# Patient Record
Sex: FEMALE | Race: WHITE | NOT HISPANIC OR LATINO | Employment: OTHER | URBAN - METROPOLITAN AREA
[De-identification: names, ages, dates, MRNs, and addresses within clinical notes are randomized per-mention and may not be internally consistent; named-entity substitution may affect disease eponyms.]

---

## 2024-06-28 ENCOUNTER — HOSPITAL ENCOUNTER (INPATIENT)
Facility: HOSPITAL | Age: 84
LOS: 4 days | Discharge: HOME | End: 2024-07-03
Attending: EMERGENCY MEDICINE | Admitting: STUDENT IN AN ORGANIZED HEALTH CARE EDUCATION/TRAINING PROGRAM
Payer: MEDICARE

## 2024-06-28 ENCOUNTER — APPOINTMENT (OUTPATIENT)
Dept: MRI IMAGING | Facility: HOSPITAL | Age: 84
End: 2024-06-28
Payer: MEDICARE

## 2024-06-28 DIAGNOSIS — S22.080A COMPRESSION FRACTURE OF T11 VERTEBRA, INITIAL ENCOUNTER: ICD-10-CM

## 2024-06-28 DIAGNOSIS — M54.9 SEVERE BACK PAIN: ICD-10-CM

## 2024-06-28 DIAGNOSIS — Z02.89 REFERRED BY HEALTH CARE PROFESSIONAL: ICD-10-CM

## 2024-06-28 DIAGNOSIS — S22.070A COMPRESSION FRACTURE OF T9 VERTEBRA, INITIAL ENCOUNTER: Primary | ICD-10-CM

## 2024-06-28 DIAGNOSIS — S22.009A CLOSED FRACTURE OF MULTIPLE THORACIC VERTEBRAE, INITIAL ENCOUNTER: ICD-10-CM

## 2024-06-28 DIAGNOSIS — M80.08XA OSTEOPOROSIS WITH PATHOLOGICAL FRACTURE OF THORACIC VERTEBRA: ICD-10-CM

## 2024-06-28 DIAGNOSIS — R52 INTRACTABLE PAIN: ICD-10-CM

## 2024-06-28 PROBLEM — I10 HTN (HYPERTENSION): Status: ACTIVE | Noted: 2024-06-28

## 2024-06-28 LAB
ALBUMIN SERPL-MCNC: 3.6 G/DL (ref 3.5–5.2)
ALBUMIN/GLOB SERPL: 1.1 G/DL
ALP SERPL-CCNC: 114 U/L (ref 39–117)
ALT SERPL W P-5'-P-CCNC: 12 U/L (ref 1–33)
ANION GAP SERPL CALCULATED.3IONS-SCNC: 13 MMOL/L (ref 5–15)
AST SERPL-CCNC: 18 U/L (ref 1–32)
BASOPHILS # BLD AUTO: 0.08 10*3/MM3 (ref 0–0.2)
BASOPHILS NFR BLD AUTO: 0.7 % (ref 0–1.5)
BILIRUB SERPL-MCNC: 0.4 MG/DL (ref 0–1.2)
BUN SERPL-MCNC: 14 MG/DL (ref 8–23)
BUN/CREAT SERPL: 11.9 (ref 7–25)
CALCIUM SPEC-SCNC: 8.8 MG/DL (ref 8.6–10.5)
CHLORIDE SERPL-SCNC: 103 MMOL/L (ref 98–107)
CO2 SERPL-SCNC: 25 MMOL/L (ref 22–29)
CREAT SERPL-MCNC: 1.18 MG/DL (ref 0.57–1)
DEPRECATED RDW RBC AUTO: 47.6 FL (ref 37–54)
EGFRCR SERPLBLD CKD-EPI 2021: 45.1 ML/MIN/1.73
EOSINOPHIL # BLD AUTO: 0.31 10*3/MM3 (ref 0–0.4)
EOSINOPHIL NFR BLD AUTO: 2.8 % (ref 0.3–6.2)
ERYTHROCYTE [DISTWIDTH] IN BLOOD BY AUTOMATED COUNT: 13.5 % (ref 12.3–15.4)
GLOBULIN UR ELPH-MCNC: 3.3 GM/DL
GLUCOSE SERPL-MCNC: 97 MG/DL (ref 65–99)
HCT VFR BLD AUTO: 38.9 % (ref 34–46.6)
HGB BLD-MCNC: 12.2 G/DL (ref 12–15.9)
HOLD SPECIMEN: NORMAL
IMM GRANULOCYTES # BLD AUTO: 0.02 10*3/MM3 (ref 0–0.05)
IMM GRANULOCYTES NFR BLD AUTO: 0.2 % (ref 0–0.5)
LYMPHOCYTES # BLD AUTO: 2.63 10*3/MM3 (ref 0.7–3.1)
LYMPHOCYTES NFR BLD AUTO: 23.4 % (ref 19.6–45.3)
MCH RBC QN AUTO: 29.8 PG (ref 26.6–33)
MCHC RBC AUTO-ENTMCNC: 31.4 G/DL (ref 31.5–35.7)
MCV RBC AUTO: 94.9 FL (ref 79–97)
MONOCYTES # BLD AUTO: 0.68 10*3/MM3 (ref 0.1–0.9)
MONOCYTES NFR BLD AUTO: 6 % (ref 5–12)
NEUTROPHILS NFR BLD AUTO: 66.9 % (ref 42.7–76)
NEUTROPHILS NFR BLD AUTO: 7.52 10*3/MM3 (ref 1.7–7)
NRBC BLD AUTO-RTO: 0 /100 WBC (ref 0–0.2)
PLATELET # BLD AUTO: 316 10*3/MM3 (ref 140–450)
PMV BLD AUTO: 10.7 FL (ref 6–12)
POTASSIUM SERPL-SCNC: 3.7 MMOL/L (ref 3.5–5.2)
PROT SERPL-MCNC: 6.9 G/DL (ref 6–8.5)
RBC # BLD AUTO: 4.1 10*6/MM3 (ref 3.77–5.28)
SODIUM SERPL-SCNC: 141 MMOL/L (ref 136–145)
WBC NRBC COR # BLD AUTO: 11.24 10*3/MM3 (ref 3.4–10.8)
WHOLE BLOOD HOLD COAG: NORMAL
WHOLE BLOOD HOLD SPECIMEN: NORMAL

## 2024-06-28 PROCEDURE — G0378 HOSPITAL OBSERVATION PER HR: HCPCS

## 2024-06-28 PROCEDURE — 72146 MRI CHEST SPINE W/O DYE: CPT

## 2024-06-28 PROCEDURE — 99222 1ST HOSP IP/OBS MODERATE 55: CPT | Performed by: NURSE PRACTITIONER

## 2024-06-28 PROCEDURE — 36415 COLL VENOUS BLD VENIPUNCTURE: CPT

## 2024-06-28 PROCEDURE — 80053 COMPREHEN METABOLIC PANEL: CPT | Performed by: NURSE PRACTITIONER

## 2024-06-28 PROCEDURE — 25010000002 KETOROLAC TROMETHAMINE PER 15 MG: Performed by: EMERGENCY MEDICINE

## 2024-06-28 PROCEDURE — 25810000003 SODIUM CHLORIDE 0.9 % SOLUTION: Performed by: EMERGENCY MEDICINE

## 2024-06-28 PROCEDURE — 85025 COMPLETE CBC W/AUTO DIFF WBC: CPT | Performed by: NURSE PRACTITIONER

## 2024-06-28 PROCEDURE — 99285 EMERGENCY DEPT VISIT HI MDM: CPT

## 2024-06-28 PROCEDURE — 25010000002 LORAZEPAM PER 2 MG: Performed by: EMERGENCY MEDICINE

## 2024-06-28 RX ORDER — PANTOPRAZOLE SODIUM 40 MG/1
40 TABLET, DELAYED RELEASE ORAL DAILY
COMMUNITY

## 2024-06-28 RX ORDER — LATANOPROST 50 UG/ML
1 SOLUTION/ DROPS OPHTHALMIC NIGHTLY
COMMUNITY

## 2024-06-28 RX ORDER — BISACODYL 5 MG/1
5 TABLET, DELAYED RELEASE ORAL DAILY PRN
Status: DISCONTINUED | OUTPATIENT
Start: 2024-06-28 | End: 2024-07-03 | Stop reason: HOSPADM

## 2024-06-28 RX ORDER — SODIUM CHLORIDE 0.9 % (FLUSH) 0.9 %
10 SYRINGE (ML) INJECTION EVERY 12 HOURS SCHEDULED
Status: DISCONTINUED | OUTPATIENT
Start: 2024-06-28 | End: 2024-07-03 | Stop reason: HOSPADM

## 2024-06-28 RX ORDER — TRAZODONE HYDROCHLORIDE 50 MG/1
50 TABLET ORAL NIGHTLY
COMMUNITY

## 2024-06-28 RX ORDER — KETOROLAC TROMETHAMINE 15 MG/ML
15 INJECTION, SOLUTION INTRAMUSCULAR; INTRAVENOUS EVERY 6 HOURS PRN
Status: DISCONTINUED | OUTPATIENT
Start: 2024-06-28 | End: 2024-06-30

## 2024-06-28 RX ORDER — DORZOLAMIDE HYDROCHLORIDE AND TIMOLOL MALEATE 20; 5 MG/ML; MG/ML
1 SOLUTION/ DROPS OPHTHALMIC 2 TIMES DAILY
Status: DISCONTINUED | OUTPATIENT
Start: 2024-06-28 | End: 2024-07-03 | Stop reason: HOSPADM

## 2024-06-28 RX ORDER — POLYETHYLENE GLYCOL 3350 17 G/17G
17 POWDER, FOR SOLUTION ORAL DAILY PRN
Status: DISCONTINUED | OUTPATIENT
Start: 2024-06-28 | End: 2024-07-03 | Stop reason: HOSPADM

## 2024-06-28 RX ORDER — DORZOLAMIDE HYDROCHLORIDE AND TIMOLOL MALEATE 20; 5 MG/ML; MG/ML
1 SOLUTION/ DROPS OPHTHALMIC 2 TIMES DAILY
COMMUNITY

## 2024-06-28 RX ORDER — PANTOPRAZOLE SODIUM 40 MG/1
40 TABLET, DELAYED RELEASE ORAL DAILY
Status: DISCONTINUED | OUTPATIENT
Start: 2024-06-28 | End: 2024-07-03 | Stop reason: HOSPADM

## 2024-06-28 RX ORDER — MORPHINE SULFATE 2 MG/ML
1 INJECTION, SOLUTION INTRAMUSCULAR; INTRAVENOUS EVERY 4 HOURS PRN
Status: DISPENSED | OUTPATIENT
Start: 2024-06-28 | End: 2024-07-03

## 2024-06-28 RX ORDER — SODIUM CHLORIDE 0.9 % (FLUSH) 0.9 %
10 SYRINGE (ML) INJECTION AS NEEDED
Status: DISCONTINUED | OUTPATIENT
Start: 2024-06-28 | End: 2024-07-03 | Stop reason: HOSPADM

## 2024-06-28 RX ORDER — AMOXICILLIN 250 MG
2 CAPSULE ORAL 2 TIMES DAILY
Status: DISCONTINUED | OUTPATIENT
Start: 2024-06-28 | End: 2024-07-03 | Stop reason: HOSPADM

## 2024-06-28 RX ORDER — OXYCODONE HYDROCHLORIDE 5 MG/1
5 TABLET ORAL EVERY 6 HOURS PRN
Status: DISPENSED | OUTPATIENT
Start: 2024-06-28 | End: 2024-07-03

## 2024-06-28 RX ORDER — KETOROLAC TROMETHAMINE 15 MG/ML
7.5 INJECTION, SOLUTION INTRAMUSCULAR; INTRAVENOUS ONCE
Status: COMPLETED | OUTPATIENT
Start: 2024-06-28 | End: 2024-06-28

## 2024-06-28 RX ORDER — BUSPIRONE HYDROCHLORIDE 10 MG/1
5 TABLET ORAL 2 TIMES DAILY
Status: DISCONTINUED | OUTPATIENT
Start: 2024-06-28 | End: 2024-07-03 | Stop reason: HOSPADM

## 2024-06-28 RX ORDER — FERROUS SULFATE 325(65) MG
325 TABLET ORAL
COMMUNITY

## 2024-06-28 RX ORDER — SODIUM CHLORIDE 9 MG/ML
40 INJECTION, SOLUTION INTRAVENOUS AS NEEDED
Status: DISCONTINUED | OUTPATIENT
Start: 2024-06-28 | End: 2024-07-03 | Stop reason: HOSPADM

## 2024-06-28 RX ORDER — ONDANSETRON 2 MG/ML
4 INJECTION INTRAMUSCULAR; INTRAVENOUS EVERY 6 HOURS PRN
Status: DISCONTINUED | OUTPATIENT
Start: 2024-06-28 | End: 2024-07-03 | Stop reason: HOSPADM

## 2024-06-28 RX ORDER — DORZOLAMIDE HYDROCHLORIDE AND TIMOLOL MALEATE 20; 5 MG/ML; MG/ML
1 SOLUTION/ DROPS OPHTHALMIC 2 TIMES DAILY
COMMUNITY
End: 2024-07-03 | Stop reason: HOSPADM

## 2024-06-28 RX ORDER — DORZOLAMIDE HYDROCHLORIDE AND TIMOLOL MALEATE 20; 5 MG/ML; MG/ML
1 SOLUTION/ DROPS OPHTHALMIC 2 TIMES DAILY
Status: DISCONTINUED | OUTPATIENT
Start: 2024-06-28 | End: 2024-06-28 | Stop reason: CLARIF

## 2024-06-28 RX ORDER — ACETAMINOPHEN 500 MG
1000 TABLET ORAL ONCE
Status: COMPLETED | OUTPATIENT
Start: 2024-06-28 | End: 2024-06-28

## 2024-06-28 RX ORDER — HYDRALAZINE HYDROCHLORIDE 20 MG/ML
10 INJECTION INTRAMUSCULAR; INTRAVENOUS EVERY 6 HOURS PRN
Status: DISCONTINUED | OUTPATIENT
Start: 2024-06-28 | End: 2024-07-03 | Stop reason: HOSPADM

## 2024-06-28 RX ORDER — LOSARTAN POTASSIUM 25 MG/1
25 TABLET ORAL
Status: DISCONTINUED | OUTPATIENT
Start: 2024-06-28 | End: 2024-07-03 | Stop reason: HOSPADM

## 2024-06-28 RX ORDER — POTASSIUM CHLORIDE 750 MG/1
10 CAPSULE, EXTENDED RELEASE ORAL DAILY
COMMUNITY
End: 2024-07-15 | Stop reason: HOSPADM

## 2024-06-28 RX ORDER — BISACODYL 10 MG
10 SUPPOSITORY, RECTAL RECTAL DAILY PRN
Status: DISCONTINUED | OUTPATIENT
Start: 2024-06-28 | End: 2024-07-03 | Stop reason: HOSPADM

## 2024-06-28 RX ORDER — TRAZODONE HYDROCHLORIDE 50 MG/1
50 TABLET ORAL NIGHTLY
Status: DISCONTINUED | OUTPATIENT
Start: 2024-06-28 | End: 2024-07-03 | Stop reason: HOSPADM

## 2024-06-28 RX ORDER — ATORVASTATIN CALCIUM 10 MG/1
10 TABLET, FILM COATED ORAL NIGHTLY
Status: DISCONTINUED | OUTPATIENT
Start: 2024-06-28 | End: 2024-07-03 | Stop reason: HOSPADM

## 2024-06-28 RX ORDER — ACETAMINOPHEN 500 MG
1000 TABLET ORAL 3 TIMES DAILY
Status: COMPLETED | OUTPATIENT
Start: 2024-06-28 | End: 2024-06-30

## 2024-06-28 RX ORDER — NALOXONE HCL 0.4 MG/ML
0.4 VIAL (ML) INJECTION
Status: DISCONTINUED | OUTPATIENT
Start: 2024-06-28 | End: 2024-07-03 | Stop reason: HOSPADM

## 2024-06-28 RX ORDER — LATANOPROST 50 UG/ML
1 SOLUTION/ DROPS OPHTHALMIC NIGHTLY
Status: DISCONTINUED | OUTPATIENT
Start: 2024-06-28 | End: 2024-07-03 | Stop reason: HOSPADM

## 2024-06-28 RX ORDER — FUROSEMIDE 20 MG/1
20 TABLET ORAL DAILY
Status: DISCONTINUED | OUTPATIENT
Start: 2024-06-29 | End: 2024-07-03 | Stop reason: HOSPADM

## 2024-06-28 RX ORDER — BUSPIRONE HYDROCHLORIDE 5 MG/1
5 TABLET ORAL 2 TIMES DAILY
COMMUNITY

## 2024-06-28 RX ORDER — FUROSEMIDE 20 MG/1
20 TABLET ORAL DAILY
COMMUNITY
End: 2024-07-15 | Stop reason: HOSPADM

## 2024-06-28 RX ORDER — ONDANSETRON 4 MG/1
4 TABLET, ORALLY DISINTEGRATING ORAL EVERY 6 HOURS PRN
Status: DISCONTINUED | OUTPATIENT
Start: 2024-06-28 | End: 2024-07-03 | Stop reason: HOSPADM

## 2024-06-28 RX ORDER — LORAZEPAM 2 MG/ML
0.5 INJECTION INTRAMUSCULAR EVERY 4 HOURS PRN
Status: DISPENSED | OUTPATIENT
Start: 2024-06-28 | End: 2024-07-03

## 2024-06-28 RX ORDER — ATORVASTATIN CALCIUM 10 MG/1
10 TABLET, FILM COATED ORAL NIGHTLY
COMMUNITY

## 2024-06-28 RX ORDER — FERROUS SULFATE 325(65) MG
325 TABLET ORAL
Status: DISCONTINUED | OUTPATIENT
Start: 2024-06-29 | End: 2024-07-03 | Stop reason: HOSPADM

## 2024-06-28 RX ADMIN — OXYCODONE HYDROCHLORIDE 5 MG: 5 TABLET ORAL at 23:12

## 2024-06-28 RX ADMIN — PANTOPRAZOLE SODIUM 40 MG: 40 TABLET, DELAYED RELEASE ORAL at 17:46

## 2024-06-28 RX ADMIN — Medication 10 ML: at 21:25

## 2024-06-28 RX ADMIN — ACETAMINOPHEN 1000 MG: 500 TABLET ORAL at 21:25

## 2024-06-28 RX ADMIN — LOSARTAN POTASSIUM 25 MG: 25 TABLET, FILM COATED ORAL at 18:39

## 2024-06-28 RX ADMIN — LORAZEPAM 0.5 MG: 2 INJECTION INTRAMUSCULAR; INTRAVENOUS at 13:11

## 2024-06-28 RX ADMIN — BUSPIRONE HYDROCHLORIDE 5 MG: 10 TABLET ORAL at 21:24

## 2024-06-28 RX ADMIN — KETOROLAC TROMETHAMINE 7.5 MG: 15 INJECTION, SOLUTION INTRAMUSCULAR; INTRAVENOUS at 10:41

## 2024-06-28 RX ADMIN — SENNOSIDES AND DOCUSATE SODIUM 2 TABLET: 50; 8.6 TABLET ORAL at 21:24

## 2024-06-28 RX ADMIN — LATANOPROST 1 DROP: 50 SOLUTION OPHTHALMIC at 21:23

## 2024-06-28 RX ADMIN — TRAZODONE HYDROCHLORIDE 50 MG: 50 TABLET ORAL at 21:24

## 2024-06-28 RX ADMIN — LORAZEPAM 0.5 MG: 2 INJECTION INTRAMUSCULAR; INTRAVENOUS at 10:41

## 2024-06-28 RX ADMIN — ATORVASTATIN CALCIUM 10 MG: 10 TABLET, FILM COATED ORAL at 21:24

## 2024-06-28 RX ADMIN — SODIUM CHLORIDE 250 ML: 9 INJECTION, SOLUTION INTRAVENOUS at 10:44

## 2024-06-28 RX ADMIN — ACETAMINOPHEN 1000 MG: 500 TABLET ORAL at 10:41

## 2024-06-28 RX ADMIN — DORZOLAMIDE HYDROCHLORIDE AND TIMOLOL MALEATE 1 DROP: 20; 5 SOLUTION/ DROPS OPHTHALMIC at 21:23

## 2024-06-28 NOTE — ED NOTES
Mariia Roberson    Nursing Report ED to Floor:  Mental status: A&OX4  Ambulatory status: UP WITH ASSIST X 1-2  Oxygen Therapy:  ROOM AIR  Cardiac Rhythm: N/A  Admitted from: ER  Safety Concerns:  VERY HARD OF HEARING  Social Issues: NA  ED Room #:  28    ED Nurse Phone Extension - [phone] or may call [phone].      HPI:   Chief Complaint   Patient presents with    Back Pain       Past Medical History:  Past Medical History:   Diagnosis Date    AAA (abdominal aortic aneurysm)     Arthritis     Glaucoma     Hypertension     Stroke         Past Surgical History:  Past Surgical History:   Procedure Laterality Date    JOINT REPLACEMENT          Admitting Doctor:   Paulette Swann DO    Consulting Provider(s):  Consults       Date and Time Order Name Status Description    6/28/2024  3:18 PM Inpatient Neurosurgery Consult               Admitting Diagnosis:   The primary encounter diagnosis was Compression fracture of T9 vertebra, initial encounter. Diagnoses of Compression fracture of T11 vertebra, initial encounter, Severe back pain, and Referred by health care professional were also pertinent to this visit.    Most Recent Vitals:   Vitals:    06/28/24 1145 06/28/24 1150 06/28/24 1245 06/28/24 1300   BP:    121/87   BP Location:       Patient Position:       Pulse: 81 82 83 81   Resp:       Temp:       TempSrc:       SpO2: 94% 95% 95% 95%   Weight:       Height:           Active LDAs/IV Access:   Lines, Drains & Airways       Active LDAs       Name Placement date Placement time Site Days    Peripheral IV 06/28/24 1037 Left Antecubital 06/28/24  1037  Antecubital  less than 1                    Labs (abnormal labs have a star):   Labs Reviewed   RAINBOW DRAW    Narrative:     The following orders were created for panel order [place] Draw.  Procedure                               Abnormality         Status                     ---------                               -----------         ------                     [facility]  (Gel)[[id number]]                                  Final result               Lavender Top[[id number]]                                     Final result               Gold Top - SST[[id number]]                                   Final result               Gray Top[[id number]]                                         Final result               Light Blue Top[[id number]]                                   Final result                 Please view results for these tests on the individual orders.   COMPREHENSIVE METABOLIC PANEL   CBC WITH AUTO DIFFERENTIAL   GREEN TOP   LAVENDER TOP   GOLD TOP - SST   GRAY TOP   LIGHT BLUE TOP   CBC AND DIFFERENTIAL    Narrative:     The following orders were created for panel order CBC & Differential.  Procedure                               Abnormality         Status                     ---------                               -----------         ------                     CBC Auto Differential[[id number]]                                                         Please view results for these tests on the individual orders.       Meds Given in ED:   Medications   sodium chloride 0.9 % flush 10 mL (has no administration in time range)   LORazepam (ATIVAN) injection 0.5 mg (0.5 mg Intravenous Given 6/28/24 1311)   sodium chloride 0.9 % bolus 250 mL (0 mL Intravenous Stopped 6/28/24 1446)   ketorolac (TORADOL) injection 7.5 mg (7.5 mg Intravenous Given 6/28/24 1041)   acetaminophen (TYLENOL) tablet 1,000 mg (1,000 mg Oral Given 6/28/24 1041)           Last NIH score:                                                          Dysphagia screening results:  Patient Factors Component (Dysphagia:Stroke or Rule-out)  Best Eye Response: 4-->(E4) spontaneous (06/28/24 1100)  Best Motor Response: 6-->(M6) obeys commands (06/28/24 1100)  Best Verbal Response: 5-->(V5) oriented (06/28/24 1100)  [place] Coma Scale Score: 15 (06/28/24 1100)     Hemanth Coma Scale:  No data recorded     CIWA:        Restraint  Type:            Isolation Status:  No active isolations

## 2024-06-28 NOTE — PLAN OF CARE
Goal Outcome Evaluation:  Plan of Care Reviewed With: patient, daughter, family        Progress: no change for moment pain controlled family bedside POC to be NPO at midnight waiting for neurosurgeon consult . V/S WNL will continue to monitor .

## 2024-06-28 NOTE — Clinical Note
Prepped: Lumbar. Prepped with: ChloraPrep. The patient was draped in a sterile fashion. Thoracic and lumbar area prepped

## 2024-06-28 NOTE — H&P
[facility] Medicine Services  HISTORY AND PHYSICAL    Patient Name: Mariia Roberson  : 1940  MRN: [id number]  Primary Care Physician: Susan Waters APRN  Date of admission: 2024      Subjective   Subjective     Chief Complaint:  intractable back pain     HPI:  Mariia Roberson is a 84 y.o. female with PMH of AAA, HTN, stroke, CHF, glaucoma, arthritis. Patient was at a  last week and her son reached in the car and put his arms around her back and lifted her out. When she was placed on the ground she states she felt a pop in her back. This happened over a week ago since then she has had increasing back pain and decreasing mobility. She states her back feels better if she is laying down. She was seen in [facility] ED on  for back pain and was given oxycodone which she states did not help at all. She has been urinating ok, decreased appetite, fever, chills or N/V/D. She also was recently started on lisinopril and she has developed a cough since then.     In ED: labs pending       Personal History     Past Medical History:   Diagnosis Date    AAA (abdominal aortic aneurysm)     Arthritis     Glaucoma     Hypertension     Stroke            Past Surgical History:   Procedure Laterality Date    JOINT REPLACEMENT         Family History: family history is not on file.     Social History:  reports that she has quit smoking. Her smoking use included cigarettes. She does not have any smokeless tobacco history on file. She reports that she does not currently use alcohol.  Social History     Social History Narrative    Not on file       Medications:  Available home medication information reviewed.  atorvastatin, busPIRone, ferrous sulfate, furosemide, pantoprazole, potassium chloride, and traZODone    No Known Allergies    Objective   Objective     Vital Signs:   Temp:  [97.6 °F (36.4 °C)] 97.6 °F (36.4 °C)  Heart Rate:  [77-83] 77  Resp:  [20] 20  BP: (121-165)/() 134/86        Physical Exam   Constitutional: Awake, alert  Eyes: PERRLA, sclerae anicteric, no conjunctival injection  HENT: NCAT, mucous membranes moist, [place]  Neck: Supple, no thyromegaly, no lymphadenopathy, trachea midline  Respiratory: Clear to auscultation bilaterally, nonlabored respirations room air 95%  Cardiovascular: RRR, no murmurs, rubs, or gallops, palpable pedal pulses bilaterally  Gastrointestinal: Positive bowel sounds, soft, nontender, nondistended  Musculoskeletal: No bilateral ankle edema, no clubbing or cyanosis to extremities  Psychiatric: Appropriate affect, cooperative  Neurologic: Oriented x 3, strength symmetric in all extremities speech clear  Skin: No rashes, pale       Result Review:  I have personally reviewed the results from the time of this admission to 6/28/2024 16:10 EDT and agree with these findings:  []  Laboratory list / accordion  []  Microbiology  [x]  Radiology  []  EKG/Telemetry   []  Cardiology/Vascular   []  Pathology  []  Old records  []  Other:  Most notable findings include:       LAB RESULTS:                                  Microbiology Results (last 10 days)       ** No results found for the last 240 hours. **            MRI Thoracic Spine Without Contrast    Result Date: 6/28/2024  MRI THORACIC SPINE WO CONTRAST Date of Exam: 6/28/2024 1:41 PM EDT Indication: worsening back pain with CT showing new T11 compression fracture.  Comparison: None available. Technique:  Routine multiplanar/multisequence sequence images of the thoracic spine were obtained without contrast administration. Findings: There is accentuation of the thoracic kyphosis inferiorly. There is a severe compression fracture at T9 and a moderate compression fracture at T11 with associated marrow edema. The other thoracic vertebral bodies appear normal in height. There is mild disc desiccation in the midthoracic spine. There is a mild disc bulge at T9-10. There is mild scattered facet arthropathy. The spinal canal  and neural foramina are widely patent throughout. The spinal cord appears normal in signal throughout. The posterior paravertebral soft tissues are unremarkable.     Impression: Impression: 1.Severe compression fracture at T9 and moderate compression fracture at T11 appear to represent subacute fractures. 2.Mild degenerative changes as described above. Electronically Signed: Wilile Lee MD  6/28/2024 2:50 PM EDT  Workstation ID: [id number]         Assessment & Plan   Assessment & Plan       Mult fractures of thoracic spine, closed    Intractable pain    HTN (hypertension)    Intractable back pain   --MRI shows Severe compression fracture at T9 and moderate compression fracture at T11 appear to represent subacute fractures.   -- consult NS  -- pain control, ultram, toradol, morphine and scheduled tylenol   -- bowel regiment     HTN  Stroke   CHF  -- stop lisinopril due to cough  -- start losartan   -- prn hydralazine for now and monitor bp   -- cont statin and lasix       VTE Prophylaxis:  Mechanical VTE prophylaxis orders are signed & held.            CODE STATUS:    Code Status and Medical Interventions:   Ordered at: 06/28/24 1610     Medical Intervention Limits:    No intubation (DNI)     Level Of Support Discussed With:    Patient    Next of Kin (If No Surrogate)     Code Status (Patient has no pulse and is not breathing):    No CPR (Do Not Attempt to Resuscitate)     Medical Interventions (Patient has pulse or is breathing):    Limited Support     Comments:    daughter       Expected Discharge   Expected Discharge Date: 7/1/2024; Expected Discharge Time:      Yeni Henry, DENISA  06/28/24

## 2024-06-28 NOTE — ED PROVIDER NOTES
Subjective   History of Present Illness  Patient is an 84-year-old female presenting to the emergency department with worsening mid back pain.  Patient went to the  of a family member last week and felt a pop on another family member was helping transition her into the vehicle.  Since then she has had worsening pain in the mid back region.  She followed up with an outside emergency department.  I did review the CT imaging results from that facility demonstrating a chronic thoracic fracture but a new/acute appearing T11 compression fracture.  This does correlate with the patient's pain complaints.  The daughter who is the POA advises that they followed up with primary care and received an order for a back brace though it was not the right size and was significantly too large for the patient.  They report that since then she has been worsening.  The primary care sent the patient here for further evaluation of the ongoing pain.    History provided by:  Patient and relative      Review of Systems    Past Medical History:   Diagnosis Date    AAA (abdominal aortic aneurysm)     Arthritis     CHF (congestive heart failure)     Glaucoma     Hypertension     Stroke        No Known Allergies    Past Surgical History:   Procedure Laterality Date    JOINT REPLACEMENT         No family history on file.    Social History     Socioeconomic History    Marital status:    Tobacco Use    Smoking status: Former     Types: Cigarettes   Vaping Use    Vaping status: Never Used   Substance and Sexual Activity    Alcohol use: Not Currently           Objective   Physical Exam  Vitals and nursing note reviewed.   Constitutional:       Appearance: Normal appearance. She is underweight.      Comments: Patient appears uncomfortable.   Cardiovascular:      Rate and Rhythm: Normal rate and regular rhythm.      Pulses: Normal pulses.   Pulmonary:      Effort: Pulmonary effort is normal. No respiratory distress.      Breath sounds:  Normal breath sounds.   Musculoskeletal:         General: Tenderness present.      Comments: There is tenderness in the lower thoracic region.  The remainder of the upper thoracic region in the lumbar region are nontender and no step-offs are palpated.   Skin:     General: Skin is warm and dry.   Neurological:      General: No focal deficit present.      Mental Status: She is alert and oriented to person, place, and time.   Psychiatric:         Mood and Affect: Mood normal.         Behavior: Behavior normal.         Procedures           ED Course  ED Course as of 06/28/24 1844   Fri Jun 28, 2024   1402 MRI Thoracic Spine Without Contrast  I personally reviewed the MRI of the thoracic spine.  On my interpretation there are compression abnormalities with changes of T9 and T11. [RS]   1513 Neurosurgery paged. [RS]   1518 Hospitalist messaged for admission [RS]   1519 Patient and family updated on the plan and they are agreeable. [RS]      ED Course User Index  [RS] Jules Dorsey MD                                             Medical Decision Making  Problems Addressed:  Compression fracture of T11 vertebra, initial encounter: complicated acute illness or injury  Compression fracture of T9 vertebra, initial encounter: complicated acute illness or injury  Referred by health care professional: complicated acute illness or injury  Severe back pain: complicated acute illness or injury    Amount and/or Complexity of Data Reviewed  Independent Historian: caregiver  External Data Reviewed: radiology and notes.  Radiology: ordered. Decision-making details documented in ED Course.  Discussion of management or test interpretation with external provider(s): Hospitalist and Neurosurgery    Risk  OTC drugs.  Prescription drug management.  Decision regarding hospitalization.        Final diagnoses:   Compression fracture of T9 vertebra, initial encounter   Compression fracture of T11 vertebra, initial encounter   Severe back  pain   Referred by health care professional       ED Disposition  ED Disposition       ED Disposition   Decision to Admit    Condition   --    Comment   Level of Care: Telemetry [5]   Diagnosis: Mult fractures of thoracic spine, closed [[id number]]   Admitting Physician: BC HADDAD [[id number]]                 No follow-up provider specified.       Medication List      No changes were made to your prescriptions during this visit.            Jules Dorsey MD  06/28/24 8090

## 2024-06-28 NOTE — ED NOTES
Mariia Roberson    Nursing Report ED to Floor:  Mental status: AOX4  Ambulatory status: ASSIST X 2   Oxygen Therapy:  RA  Cardiac Rhythm: NSR  Admitted from: HOME  Safety Concerns:  FALL RISK  Social Issues: NA  ED Room #:  28    ED Nurse Phone Extension - [phone] or may call [phone].      HPI:   Chief Complaint   Patient presents with    Back Pain       Past Medical History:  Past Medical History:   Diagnosis Date    AAA (abdominal aortic aneurysm)     Arthritis     Glaucoma     Hypertension     Stroke         Past Surgical History:  Past Surgical History:   Procedure Laterality Date    JOINT REPLACEMENT          Admitting Doctor:   Paulette Swann DO    Consulting Provider(s):  Consults       Date and Time Order Name Status Description    6/28/2024  3:18 PM Inpatient Neurosurgery Consult               Admitting Diagnosis:   The primary encounter diagnosis was Compression fracture of T9 vertebra, initial encounter. Diagnoses of Compression fracture of T11 vertebra, initial encounter, Severe back pain, and Referred by health care professional were also pertinent to this visit.    Most Recent Vitals:   Vitals:    06/28/24 1145 06/28/24 1150 06/28/24 1245 06/28/24 1300   BP:    121/87   BP Location:       Patient Position:       Pulse: 81 82 83 81   Resp:       Temp:       TempSrc:       SpO2: 94% 95% 95% 95%   Weight:       Height:           Active LDAs/IV Access:   Lines, Drains & Airways       Active LDAs       Name Placement date Placement time Site Days    Peripheral IV 06/28/24 1037 Left Antecubital 06/28/24  1037  Antecubital  less than 1                    Labs (abnormal labs have a star):   Labs Reviewed   RAINBOW DRAW    Narrative:     The following orders were created for panel order [place] Draw.  Procedure                               Abnormality         Status                     ---------                               -----------         ------                     Green Top (Gel)[[id number]]                                   Final result               Lavender Top[[id number]]                                     Final result               Gold Top - SST[[id number]]                                   Final result               Gray Top[[id number]]                                         Final result               Light Blue Top[[id number]]                                   Final result                 Please view results for these tests on the individual orders.   COMPREHENSIVE METABOLIC PANEL   CBC WITH AUTO DIFFERENTIAL   GREEN TOP   LAVENDER TOP   GOLD TOP - SST   GRAY TOP   LIGHT BLUE TOP   CBC AND DIFFERENTIAL    Narrative:     The following orders were created for panel order CBC & Differential.  Procedure                               Abnormality         Status                     ---------                               -----------         ------                     CBC Auto Differential[[id number]]                                                         Please view results for these tests on the individual orders.       Meds Given in ED:   Medications   sodium chloride 0.9 % flush 10 mL (has no administration in time range)   LORazepam (ATIVAN) injection 0.5 mg (0.5 mg Intravenous Given 6/28/24 1311)   sodium chloride 0.9 % bolus 250 mL (0 mL Intravenous Stopped 6/28/24 1446)   ketorolac (TORADOL) injection 7.5 mg (7.5 mg Intravenous Given 6/28/24 1041)   acetaminophen (TYLENOL) tablet 1,000 mg (1,000 mg Oral Given 6/28/24 1041)           Last NIH score:                                                          Dysphagia screening results:  Patient Factors Component (Dysphagia:Stroke or Rule-out)  Best Eye Response: 4-->(E4) spontaneous (06/28/24 1100)  Best Motor Response: 6-->(M6) obeys commands (06/28/24 1100)  Best Verbal Response: 5-->(V5) oriented (06/28/24 1100)  [place] Coma Scale Score: 15 (06/28/24 1100)     Hemanth Coma Scale:  No data recorded     CIWA:        Restraint Type:            Isolation  Status:  No active isolations

## 2024-06-29 PROBLEM — S22.009A: Status: ACTIVE | Noted: 2024-06-29

## 2024-06-29 LAB
ANION GAP SERPL CALCULATED.3IONS-SCNC: 9 MMOL/L (ref 5–15)
BASOPHILS # BLD AUTO: 0.1 10*3/MM3 (ref 0–0.2)
BASOPHILS NFR BLD AUTO: 0.9 % (ref 0–1.5)
BUN SERPL-MCNC: 21 MG/DL (ref 8–23)
BUN/CREAT SERPL: 15.7 (ref 7–25)
CALCIUM SPEC-SCNC: 8.5 MG/DL (ref 8.6–10.5)
CHLORIDE SERPL-SCNC: 104 MMOL/L (ref 98–107)
CO2 SERPL-SCNC: 27 MMOL/L (ref 22–29)
CREAT SERPL-MCNC: 1.34 MG/DL (ref 0.57–1)
DEPRECATED RDW RBC AUTO: 47.1 FL (ref 37–54)
EGFRCR SERPLBLD CKD-EPI 2021: 38.7 ML/MIN/1.73
EOSINOPHIL # BLD AUTO: 0.49 10*3/MM3 (ref 0–0.4)
EOSINOPHIL NFR BLD AUTO: 4.5 % (ref 0.3–6.2)
ERYTHROCYTE [DISTWIDTH] IN BLOOD BY AUTOMATED COUNT: 13.5 % (ref 12.3–15.4)
GLUCOSE SERPL-MCNC: 92 MG/DL (ref 65–99)
HCT VFR BLD AUTO: 35.1 % (ref 34–46.6)
HGB BLD-MCNC: 11 G/DL (ref 12–15.9)
IMM GRANULOCYTES # BLD AUTO: 0.03 10*3/MM3 (ref 0–0.05)
IMM GRANULOCYTES NFR BLD AUTO: 0.3 % (ref 0–0.5)
LYMPHOCYTES # BLD AUTO: 2.2 10*3/MM3 (ref 0.7–3.1)
LYMPHOCYTES NFR BLD AUTO: 20.2 % (ref 19.6–45.3)
MCH RBC QN AUTO: 29.7 PG (ref 26.6–33)
MCHC RBC AUTO-ENTMCNC: 31.3 G/DL (ref 31.5–35.7)
MCV RBC AUTO: 94.9 FL (ref 79–97)
MONOCYTES # BLD AUTO: 0.81 10*3/MM3 (ref 0.1–0.9)
MONOCYTES NFR BLD AUTO: 7.4 % (ref 5–12)
NEUTROPHILS NFR BLD AUTO: 66.7 % (ref 42.7–76)
NEUTROPHILS NFR BLD AUTO: 7.26 10*3/MM3 (ref 1.7–7)
NRBC BLD AUTO-RTO: 0 /100 WBC (ref 0–0.2)
PLATELET # BLD AUTO: 319 10*3/MM3 (ref 140–450)
PMV BLD AUTO: 10.8 FL (ref 6–12)
POTASSIUM SERPL-SCNC: 3.5 MMOL/L (ref 3.5–5.2)
RBC # BLD AUTO: 3.7 10*6/MM3 (ref 3.77–5.28)
SODIUM SERPL-SCNC: 140 MMOL/L (ref 136–145)
WBC NRBC COR # BLD AUTO: 10.89 10*3/MM3 (ref 3.4–10.8)

## 2024-06-29 PROCEDURE — 25010000002 HYDRALAZINE PER 20 MG: Performed by: NURSE PRACTITIONER

## 2024-06-29 PROCEDURE — 99221 1ST HOSP IP/OBS SF/LOW 40: CPT | Performed by: PHYSICIAN ASSISTANT

## 2024-06-29 PROCEDURE — 85025 COMPLETE CBC W/AUTO DIFF WBC: CPT | Performed by: NURSE PRACTITIONER

## 2024-06-29 PROCEDURE — 25010000002 MORPHINE PER 10 MG: Performed by: NURSE PRACTITIONER

## 2024-06-29 PROCEDURE — 99232 SBSQ HOSP IP/OBS MODERATE 35: CPT | Performed by: INTERNAL MEDICINE

## 2024-06-29 PROCEDURE — 80048 BASIC METABOLIC PNL TOTAL CA: CPT | Performed by: NURSE PRACTITIONER

## 2024-06-29 PROCEDURE — 25810000003 SODIUM CHLORIDE 0.9 % SOLUTION: Performed by: INTERNAL MEDICINE

## 2024-06-29 PROCEDURE — 25010000002 KETOROLAC TROMETHAMINE PER 15 MG: Performed by: NURSE PRACTITIONER

## 2024-06-29 RX ORDER — SODIUM CHLORIDE 9 MG/ML
50 INJECTION, SOLUTION INTRAVENOUS CONTINUOUS
Status: ACTIVE | OUTPATIENT
Start: 2024-06-29 | End: 2024-06-29

## 2024-06-29 RX ORDER — LIDOCAINE 4 G/G
1 PATCH TOPICAL
Status: DISCONTINUED | OUTPATIENT
Start: 2024-06-29 | End: 2024-07-03 | Stop reason: HOSPADM

## 2024-06-29 RX ADMIN — ACETAMINOPHEN 1000 MG: 500 TABLET ORAL at 15:04

## 2024-06-29 RX ADMIN — LIDOCAINE 1 PATCH: 4 PATCH TOPICAL at 17:30

## 2024-06-29 RX ADMIN — ATORVASTATIN CALCIUM 10 MG: 10 TABLET, FILM COATED ORAL at 19:48

## 2024-06-29 RX ADMIN — Medication 10 ML: at 19:48

## 2024-06-29 RX ADMIN — HYDRALAZINE HYDROCHLORIDE 10 MG: 20 INJECTION INTRAMUSCULAR; INTRAVENOUS at 13:02

## 2024-06-29 RX ADMIN — OXYCODONE HYDROCHLORIDE 5 MG: 5 TABLET ORAL at 10:33

## 2024-06-29 RX ADMIN — KETOROLAC TROMETHAMINE 15 MG: 15 INJECTION, SOLUTION INTRAMUSCULAR; INTRAVENOUS at 14:10

## 2024-06-29 RX ADMIN — BUSPIRONE HYDROCHLORIDE 5 MG: 10 TABLET ORAL at 19:47

## 2024-06-29 RX ADMIN — BUSPIRONE HYDROCHLORIDE 5 MG: 10 TABLET ORAL at 10:34

## 2024-06-29 RX ADMIN — MORPHINE SULFATE 1 MG: 2 INJECTION, SOLUTION INTRAMUSCULAR; INTRAVENOUS at 08:57

## 2024-06-29 RX ADMIN — SENNOSIDES AND DOCUSATE SODIUM 2 TABLET: 50; 8.6 TABLET ORAL at 10:33

## 2024-06-29 RX ADMIN — PANTOPRAZOLE SODIUM 40 MG: 40 TABLET, DELAYED RELEASE ORAL at 10:33

## 2024-06-29 RX ADMIN — KETOROLAC TROMETHAMINE 15 MG: 15 INJECTION, SOLUTION INTRAMUSCULAR; INTRAVENOUS at 06:48

## 2024-06-29 RX ADMIN — TRAZODONE HYDROCHLORIDE 50 MG: 50 TABLET ORAL at 19:48

## 2024-06-29 RX ADMIN — DORZOLAMIDE HYDROCHLORIDE AND TIMOLOL MALEATE 1 DROP: 20; 5 SOLUTION/ DROPS OPHTHALMIC at 19:49

## 2024-06-29 RX ADMIN — OXYCODONE HYDROCHLORIDE 5 MG: 5 TABLET ORAL at 18:50

## 2024-06-29 RX ADMIN — ACETAMINOPHEN 1000 MG: 500 TABLET ORAL at 19:48

## 2024-06-29 RX ADMIN — SODIUM CHLORIDE 50 ML/HR: 9 INJECTION, SOLUTION INTRAVENOUS at 10:59

## 2024-06-29 RX ADMIN — ACETAMINOPHEN 1000 MG: 500 TABLET ORAL at 10:20

## 2024-06-29 RX ADMIN — SENNOSIDES AND DOCUSATE SODIUM 2 TABLET: 50; 8.6 TABLET ORAL at 19:47

## 2024-06-29 RX ADMIN — LATANOPROST 1 DROP: 50 SOLUTION OPHTHALMIC at 19:49

## 2024-06-29 RX ADMIN — Medication 10 ML: at 08:58

## 2024-06-29 NOTE — PROGRESS NOTES
Psychiatric Medicine Services  PROGRESS NOTE    Patient Name: Mariia Roberson  : 1940  MRN: [id number]    Date of Admission: 2024  Primary Care Physician: Susan Waters APRN    Subjective   Subjective     CC: Follow-up back pain    HPI: No acute events overnight, patient states that pain is controlled      Objective   Objective     Vital Signs:   Temp:  [96.5 °F (35.8 °C)-99 °F (37.2 °C)] 96.5 °F (35.8 °C)  Heart Rate:  [77-83] 80  Resp:  [16-20] 17  BP: ()/() 90/74     Physical Exam:  Constitutional: Chronically ill-appearing elderly female, seated in bed.    HENT: NCAT, mucous membranes moist  Respiratory: Clear to auscultation bilaterally, respiratory effort normal   Cardiovascular: RRR, no murmurs, rubs, or gallops  Gastrointestinal: Positive bowel sounds, soft, nontender, nondistended  Musculoskeletal: No bilateral ankle edema  Psychiatric: Appropriate affect, cooperative  Neurologic: Nonfocal  Skin: No rashes      Results Reviewed:  LAB RESULTS:      Lab 24  0449 24  1805   WBC 10.89* 11.24*   HEMOGLOBIN 11.0* 12.2   HEMATOCRIT 35.1 38.9   PLATELETS 319 316   NEUTROS ABS 7.26* 7.52*   IMMATURE GRANS (ABS) 0.03 0.02   LYMPHS ABS 2.20 2.63   MONOS ABS 0.81 0.68   EOS ABS 0.49* 0.31   MCV 94.9 94.9         Lab 24  0449 24  1805   SODIUM 140 141   POTASSIUM 3.5 3.7   CHLORIDE 104 103   CO2 27.0 25.0   ANION GAP 9.0 13.0   BUN 21 14   CREATININE 1.34* 1.18*   EGFR 38.7* 45.1*   GLUCOSE 92 97   CALCIUM 8.5* 8.8         Lab 24  1805   TOTAL PROTEIN 6.9   ALBUMIN 3.6   GLOBULIN 3.3   ALT (SGPT) 12   AST (SGOT) 18   BILIRUBIN 0.4   ALK PHOS 114                     Brief Urine Lab Results       None            Microbiology Results Abnormal       None            MRI Thoracic Spine Without Contrast    Result Date: 2024  MRI THORACIC SPINE WO CONTRAST Date of Exam: 2024 1:41 PM EDT Indication: worsening back pain with CT showing  new T11 compression fracture.  Comparison: None available. Technique:  Routine multiplanar/multisequence sequence images of the thoracic spine were obtained without contrast administration. Findings: There is accentuation of the thoracic kyphosis inferiorly. There is a severe compression fracture at T9 and a moderate compression fracture at T11 with associated marrow edema. The other thoracic vertebral bodies appear normal in height. There is mild disc desiccation in the midthoracic spine. There is a mild disc bulge at T9-10. There is mild scattered facet arthropathy. The spinal canal and neural foramina are widely patent throughout. The spinal cord appears normal in signal throughout. The posterior paravertebral soft tissues are unremarkable.     Impression: Impression: 1.Severe compression fracture at T9 and moderate compression fracture at T11 appear to represent subacute fractures. 2.Mild degenerative changes as described above. Electronically Signed: Willie Lee MD  6/28/2024 2:50 PM EDT  Workstation ID: [id number]         Current medications:  Scheduled Meds:acetaminophen, 1,000 mg, Oral, TID  atorvastatin, 10 mg, Oral, Nightly  busPIRone, 5 mg, Oral, BID  dorzolamide-timolol, 1 drop, Right Eye, BID  ferrous sulfate, 325 mg, Oral, Daily With Breakfast  furosemide, 20 mg, Oral, Daily  latanoprost, 1 drop, Right Eye, Nightly  losartan, 25 mg, Oral, Q24H  pantoprazole, 40 mg, Oral, Daily  senna-docusate sodium, 2 tablet, Oral, BID  sodium chloride, 10 mL, Intravenous, Q12H  traZODone, 50 mg, Oral, Nightly      Continuous Infusions:   PRN Meds:.  senna-docusate sodium **AND** polyethylene glycol **AND** bisacodyl **AND** bisacodyl    Calcium Replacement - Follow Nurse / BPA Driven Protocol    hydrALAZINE    ketorolac    LORazepam    Magnesium Standard Dose Replacement - Follow Nurse / BPA Driven Protocol    Morphine **AND** naloxone    ondansetron ODT **OR** ondansetron    oxyCODONE    Phosphorus Replacement -  Follow Nurse / BPA Driven Protocol    Potassium Replacement - Follow Nurse / BPA Driven Protocol    [COMPLETED] Insert Peripheral IV **AND** sodium chloride    sodium chloride    sodium chloride    Assessment & Plan   Assessment & Plan     Active Hospital Problems    Diagnosis  POA    **Mult fractures of thoracic spine, closed [S22.009A]  Yes    Intractable pain [R52]  Unknown    HTN (hypertension) [I10]  Unknown      Resolved Hospital Problems   No resolved problems to display.      Brief Hospital Course to date:  Mariia Roberson is a 84 y.o. female with history of hypertension, CVA, CHF, glaucoma, AAA who presents to the ED with intractable back pain found to have multiple fractures of the thoracic spine    Subacute compression fractures at T9 and T11   Intractable back pain  -MRI findings as above  -Currently awaiting evaluation by neurosurgery  -Continue pain control  -PT/OT to evaluate    Hypertension  -BP is currently low, hold home antihypertensives/diuretics  -Of note, home lisinopril has been discontinued secondary to cough    Renal insufficiency  -Cr with slight bump this morning 1.18-->1.34  -Will start gentle fluids, closely monitor  -Hold Lasix and losartan    Hx of CHF (data deficient)  -seems compensated, hold lasix sec to hypotension and renal insuffiencey    CVA  -on statin    Expected Discharge Location and Transportation: TBD  Expected Discharge   Expected Discharge Date: 7/1/2024; Expected Discharge Time:      VTE Prophylaxis:  Mechanical VTE prophylaxis orders are present.       AM-PAC 6 Clicks Score (PT): 18 (06/28/24 2000)    CODE STATUS:   Code Status and Medical Interventions:   Ordered at: 06/28/24 1610     Medical Intervention Limits:    No intubation (DNI)     Level Of Support Discussed With:    Patient    Next of Kin (If No Surrogate)     Code Status (Patient has no pulse and is not breathing):    No CPR (Do Not Attempt to Resuscitate)     Medical Interventions (Patient has pulse or is  breathing):    Limited Support     Comments:    daughter       Naveen Daly MD  06/29/24

## 2024-06-29 NOTE — PROGRESS NOTES
"          Clinical Nutrition Assessment     Patient Name: Mariia Roberson  YOB: 1940  MRN: [id number]  Date of Encounter: 06/29/24 11:40 EDT  Admission date: 6/28/2024  Reason for Visit: BMI    Assessment   Nutrition Assessment   Admission Diagnosis:  Mult fractures of thoracic spine, closed [S22.009A]  Multiple fractures of thoracic spine [S22.009A]    Problem List:    Mult fractures of thoracic spine, closed    Intractable pain    HTN (hypertension)    Multiple fractures of thoracic spine      PMH:   She  has a past medical history of AAA (abdominal aortic aneurysm), Arthritis, CHF (congestive heart failure), Glaucoma, Hypertension, and Stroke.    PSH:  She  has a past surgical history that includes Joint replacement.    Applicable Nutrition History:       Anthropometrics     Height: Height: 152.4 cm (60\")  Last Filed Weight: Weight: 44 kg (97 lb) (06/28/24 1000)  Method: Weight Method: Stated  BMI: BMI (Calculated): 18.9    UBW:  97.4# stated from home scale  7/31/23: 88#   Weight change: unchanged    Nutrition Focused Physical Exam    Date:  6/29       Unable to perform due to Potential for patient discomfort     Subjective   Reported/Observed/Food/Nutrition Related History:     Pt up in bed with multiple family members present at time of visit. Endorsed a good appetite/intake prior to injury on 6/19/24, however since injury has had decreasing intake ? 2/2 pain and/or medication. Reported feelings of hunger at this time. Declined ONS 2/2 distaste. NKFA    Current Nutrition Prescription   PO: Diet: Regular/House; Fluid Consistency: Thin (IDDSI 0)  Oral Nutrition Supplement: N/A  Intake: N/A    Assessment & Plan   Nutrition Diagnosis   Date:  6/29            Updated:    Problem Inadequate oral intake    Etiology S/p thoracic spine injury   Signs/Symptoms Reported PO hx   Status: New    Goal:   Nutrition to support treatment and Increase intake      Nutrition Intervention      Follow treatment " progress, Care plan reviewed, Advise alternate selection, Advised available snacks, Encourage intake, Supplement offered/refused    Encourage PO intake on current diet  Declined ONS    Monitoring/Evaluation:   Per protocol, I&O, PO intake, Pertinent labs, Weight    Erika Martin MS,RD,LD  Time Spent: 25min

## 2024-06-29 NOTE — CONSULTS
[facility]    Referring Provider: Hospital medicine  Reason for Consultation: Thoracic compression fractures  Primary Provider: DENISA Levy    [phone]    Mariia Roberson is a 84 y.o. female     CC: Severe back pain    History of Present Illness   Ms. Frost is an 84-year-old female from [facility] who was recently at her sisters  and she was being helped out of a vehicle and there was a pop in her back.  She was admitted to [facility] on 2024, an MRI of the thoracic spine was obtained which shows acute compression fractures T9 and T11    chronic medical conditions:  Past Medical History:   Diagnosis Date    AAA (abdominal aortic aneurysm)     Arthritis     CHF (congestive heart failure)     Glaucoma     Hypertension     Stroke        No Known Allergies      Current Facility-Administered Medications:     acetaminophen (TYLENOL) tablet 1,000 mg, 1,000 mg, Oral, TID, Yeni Henry APRN, 1,000 mg at 24 1504    atorvastatin (LIPITOR) tablet 10 mg, 10 mg, Oral, Nightly, Yeni Henry APRN, 10 mg at 24    sennosides-docusate (PERICOLACE) 8.6-50 MG per tablet 2 tablet, 2 tablet, Oral, BID, 2 tablet at 24 1033 **AND** polyethylene glycol (MIRALAX) packet 17 g, 17 g, Oral, Daily PRN **AND** bisacodyl (DULCOLAX) EC tablet 5 mg, 5 mg, Oral, Daily PRN **AND** bisacodyl (DULCOLAX) suppository 10 mg, 10 mg, Rectal, Daily PRN, Yeni Henry APRN    busPIRone (BUSPAR) tablet 5 mg, 5 mg, Oral, BID, Yeni Henry APRN, 5 mg at 24 1034    Calcium Replacement - Follow Nurse / BPA Driven Protocol, , Does not apply, PRN, Yeni Henry, DENISA    dorzolamide-timolol (COSOPT) ophthalmic solution 1 drop, 1 drop, Right Eye, BID, Yeni Henry, APRN, 1 drop at 24    ferrous sulfate tablet 325 mg, 325 mg, Oral, Daily With Breakfast, Yeni Henry APRN    [Held by  provider] furosemide (LASIX) tablet 20 mg, 20 mg, Oral, Daily, Yeni Henry, APRN    hydrALAZINE (APRESOLINE) injection 10 mg, 10 mg, Intravenous, Q6H PRN, Yeni Henry, APRN, 10 mg at 06/29/24 1302    ketorolac (TORADOL) injection 15 mg, 15 mg, Intravenous, Q6H PRN, Yeni Henry, APRN, 15 mg at 06/29/24 1410    latanoprost (XALATAN) 0.005 % ophthalmic solution 1 drop, 1 drop, Right Eye, Nightly, Yeni Henry, APRN, 1 drop at 06/28/24 2123    LORazepam (ATIVAN) injection 0.5 mg, 0.5 mg, Intravenous, Q4H PRN, Jules Dorsey MD, 0.5 mg at 06/28/24 1311    [Held by provider] losartan (COZAAR) tablet 25 mg, 25 mg, Oral, Q24H, Yeni Henry, APRN, 25 mg at 06/28/24 1839    Magnesium Standard Dose Replacement - Follow Nurse / BPA Driven Protocol, , Does not apply, PRN, Yeni Henry, APRN    morphine injection 1 mg, 1 mg, Intravenous, Q4H PRN, 1 mg at 06/29/24 0857 **AND** naloxone (NARCAN) injection 0.4 mg, 0.4 mg, Intravenous, Q5 Min PRN, Yeni Henry, APRN    ondansetron ODT (ZOFRAN-ODT) disintegrating tablet 4 mg, 4 mg, Oral, Q6H PRN **OR** ondansetron (ZOFRAN) injection 4 mg, 4 mg, Intravenous, Q6H PRN, Yeni Henry, APRN    oxyCODONE (ROXICODONE) immediate release tablet 5 mg, 5 mg, Oral, Q6H PRN, Yeni Henry, APRN, 5 mg at 06/29/24 1033    pantoprazole (PROTONIX) EC tablet 40 mg, 40 mg, Oral, Daily, Yeni Henry, APRN, 40 mg at 06/29/24 1033    Phosphorus Replacement - Follow Nurse / BPA Driven Protocol, , Does not apply, PRN, Yeni Henry, APRN    Potassium Replacement - Follow Nurse / BPA Driven Protocol, , Does not apply, PRN, Yeni Henry, APRNNEKA    sodium chloride 0.9 % bolus 500 mL, 500 mL, Intravenous, Once, Naveen Daly MD, Last Rate: 500 mL/hr at 06/29/24 1503, Rate Change at 06/29/24 1503    [COMPLETED] Insert Peripheral IV, , , Once **AND** sodium chloride 0.9 % flush 10 mL, 10 mL, Intravenous, PRN, Jules Dorsey  "MD Tere    sodium chloride 0.9 % flush 10 mL, 10 mL, Intravenous, Q12H, Yeni Henry, APRN, 10 mL at 06/29/24 0858    sodium chloride 0.9 % flush 10 mL, 10 mL, Intravenous, PRN, Yeni Henry, APRN    sodium chloride 0.9 % infusion 40 mL, 40 mL, Intravenous, PRN, Yeni Henry, APRN    sodium chloride 0.9 % infusion, 50 mL/hr, Intravenous, Continuous, Naveen Daly MD, Last Rate: 50 mL/hr at 06/29/24 1059, 50 mL/hr at 06/29/24 1059    traZODone (DESYREL) tablet 50 mg, 50 mg, Oral, Nightly, Yeni Henry APRN, 50 mg at 06/28/24 2124    Past Surgical History:   Procedure Laterality Date    JOINT REPLACEMENT            Social History     Socioeconomic History    Marital status:    Tobacco Use    Smoking status: Former     Current packs/day: 0.00     Average packs/day: 1 pack/day for 61.0 years (61.0 ttl pk-yrs)     Types: Cigarettes     Start date: 1960     Quit date: 2021     Years since quitting: 3.4   Vaping Use    Vaping status: Never Used   Substance and Sexual Activity    Alcohol use: Not Currently    Drug use: Never    Sexual activity: Not Currently       History reviewed. No pertinent family history.    ROS:  Pertinent items are noted in HPI, all other systems reviewed and negative.  I have reviewed and confirmed the accuracy of the ROS as documented by the MA/LPN/RN Lida Mack PA-C      Physical Exam  BP (!) 174/102 (BP Location: Right arm, Patient Position: Lying)   Pulse 68   Temp 97.6 °F (36.4 °C) (Oral)   Resp 18   Ht 152.4 cm (60\")   Wt 44 kg (97 lb)   SpO2 97%   BMI 18.94 kg/m²   A/A/C/O  HEENT:WNL  Neck: supple  Lungs: normal expansion  COR: RRR  Abd: non tender  EXT: +pulses, -edema    Neurologic Exam  CRANIAL NERVES:  Cranial nerve II:  Visual fields are full to confrontation.  Cranial nerves III, IV and VI:  PERRLADC.  Extraocular movements are intact.  Nystagmus is not present.  Cranial nerve V:  Facial sensation is intact to light touch.  Cranial " nerve VII:  Muscles of facial expression reveal no asymmetry.  Cranial nerve VIII:  Hearing is intact to finger rub bilaterally.  Cranial nerves IX and X:  Palate elevates symmetrically.  Cranial nerve XI:  Shoulder shrug is intact.  Cranial nerve XII:  Tongue is midline without evidence of atrophy or fasciculation.    NEURO:  Patient is awake and alert, she is very hard of hearing but she is able to answer questions.  She did tell me she did not like the previous brace.    Musculoskeletal exam:   She is able to move all fours.  She was ambulatory at home with a walker.    Results Reviewed:   I have personally reviewed the laboratory data and imaging results.    Radiological studies:        LABS:   Lab Results (last 24 hours)       Procedure Component Value Units Date/Time    CBC Auto Differential [[id number]]  (Abnormal) Collected: 06/29/24 0449    Specimen: Blood Updated: 06/29/24 0614     WBC 10.89 10*3/mm3      RBC 3.70 10*6/mm3      Hemoglobin 11.0 g/dL      Hematocrit 35.1 %      MCV 94.9 fL      MCH 29.7 pg      MCHC 31.3 g/dL      RDW 13.5 %      RDW-SD 47.1 fl      MPV 10.8 fL      Platelets 319 10*3/mm3      Neutrophil % 66.7 %      Lymphocyte % 20.2 %      Monocyte % 7.4 %      Eosinophil % 4.5 %      Basophil % 0.9 %      Immature Grans % 0.3 %      Neutrophils, Absolute 7.26 10*3/mm3      Lymphocytes, Absolute 2.20 10*3/mm3      Monocytes, Absolute 0.81 10*3/mm3      Eosinophils, Absolute 0.49 10*3/mm3      Basophils, Absolute 0.10 10*3/mm3      Immature Grans, Absolute 0.03 10*3/mm3      nRBC 0.0 /100 WBC     Basic Metabolic Panel [[id number]]  (Abnormal) Collected: 06/29/24 0449    Specimen: Blood Updated: 06/29/24 0609     Glucose 92 mg/dL      BUN 21 mg/dL      Creatinine 1.34 mg/dL      Sodium 140 mmol/L      Potassium 3.5 mmol/L      Chloride 104 mmol/L      CO2 27.0 mmol/L      Calcium 8.5 mg/dL      BUN/Creatinine Ratio 15.7     Anion Gap 9.0 mmol/L      eGFR 38.7 mL/min/1.73     Narrative:       GFR Normal >60  Chronic Kidney Disease <60  Kidney Failure <15    The GFR formula is only valid for adults with stable renal function between ages 18 and 70.    Comprehensive Metabolic Panel [[id number]]  (Abnormal) Collected: 06/28/24 1805    Specimen: Blood Updated: 06/28/24 1857     Glucose 97 mg/dL      BUN 14 mg/dL      Creatinine 1.18 mg/dL      Sodium 141 mmol/L      Potassium 3.7 mmol/L      Comment: Slight hemolysis detected by analyzer. Result may be falsely elevated.        Chloride 103 mmol/L      CO2 25.0 mmol/L      Calcium 8.8 mg/dL      Total Protein 6.9 g/dL      Albumin 3.6 g/dL      ALT (SGPT) 12 U/L      AST (SGOT) 18 U/L      Alkaline Phosphatase 114 U/L      Total Bilirubin 0.4 mg/dL      Globulin 3.3 gm/dL      Comment: Calculated Result        A/G Ratio 1.1 g/dL      BUN/Creatinine Ratio 11.9     Anion Gap 13.0 mmol/L      eGFR 45.1 mL/min/1.73     Narrative:      GFR Normal >60  Chronic Kidney Disease <60  Kidney Failure <15    The GFR formula is only valid for adults with stable renal function between ages 18 and 70.    CBC & Differential [[id number]]  (Abnormal) Collected: 06/28/24 1805    Specimen: Blood Updated: 06/28/24 1832    Narrative:      The following orders were created for panel order CBC & Differential.  Procedure                               Abnormality         Status                     ---------                               -----------         ------                     CBC Auto Differential[[id number]]        Abnormal            Final result                 Please view results for these tests on the individual orders.    CBC Auto Differential [[id number]]  (Abnormal) Collected: 06/28/24 1805    Specimen: Blood Updated: 06/28/24 1832     WBC 11.24 10*3/mm3      RBC 4.10 10*6/mm3      Hemoglobin 12.2 g/dL      Hematocrit 38.9 %      MCV 94.9 fL      MCH 29.8 pg      MCHC 31.4 g/dL      RDW 13.5 %      RDW-SD 47.6 fl      MPV 10.7 fL      Platelets 316 10*3/mm3      Neutrophil %  66.9 %      Lymphocyte % 23.4 %      Monocyte % 6.0 %      Eosinophil % 2.8 %      Basophil % 0.7 %      Immature Grans % 0.2 %      Neutrophils, Absolute 7.52 10*3/mm3      Lymphocytes, Absolute 2.63 10*3/mm3      Monocytes, Absolute 0.68 10*3/mm3      Eosinophils, Absolute 0.31 10*3/mm3      Basophils, Absolute 0.08 10*3/mm3      Immature Grans, Absolute 0.02 10*3/mm3      nRBC 0.0 /100 WBC             MDM    Assessment and Plan:  1.  Compression deformities of T9 and T11. Intractable back pain.  2  renal insuff. On IVF  3.HTN  4.hx of CHF    This 83 yo female presents with intractable mid back pain and found to have 2 compression fx's. She is agreeable to vertebroplasty. She was given a clam shell brace that she could not tolerate. I have discussed using an abdominal binder to try to give her some support, she and the family are in agreement. She does have a problem with constipation so we must be careful with opiods and monitoring bowel movements. I will discuss with Dr. Armenta in  and we will make further arrangements.     Any copied data from previous notes included in the (1) HPI, (2) PE, (3) MDM and/or assessment and plan has been reviewed and is accurate as of this date.    Lida Mack PA-C  06/29/24  16:00 EDT

## 2024-06-29 NOTE — PLAN OF CARE
Problem: Adult Inpatient Plan of Care  Goal: Plan of Care Review  Outcome: Ongoing, Progressing  Flowsheets (Taken 6/29/2024 0457)  Progress: no change  Plan of Care Reviewed With: patient  Goal: Optimal Comfort and Wellbeing  Outcome: Ongoing, Progressing  Intervention: Provide Person-Centered Care  Recent Flowsheet Documentation  Taken 6/29/2024 0400 by Сергей Whitmore RN  Trust Relationship/Rapport: care explained  Taken 6/29/2024 0202 by Сергей Whitmore, RN  Trust Relationship/Rapport: care explained  Taken 6/29/2024 0000 by Сергей Whitmore RN  Trust Relationship/Rapport: care explained  Taken 6/28/2024 2200 by Сергей Whitmore RN  Trust Relationship/Rapport: care explained  Taken 6/28/2024 2000 by Сергей Whitmore RN  Trust Relationship/Rapport:   care explained   choices provided   emotional support provided   empathic listening provided   questions answered   reassurance provided   questions encouraged   thoughts/feelings acknowledged     Problem: Pain Acute  Goal: Acceptable Pain Control and Functional Ability  Outcome: Ongoing, Progressing  Intervention: Prevent or Manage Pain  Recent Flowsheet Documentation  Taken 6/29/2024 0400 by Сергей Whitmore RN  Sensory Stimulation Regulation: lighting decreased  Medication Review/Management:   medications reviewed   high-risk medications identified  Taken 6/29/2024 0202 by Сергей Whitmore RN  Sensory Stimulation Regulation: lighting decreased  Medication Review/Management: medications reviewed  Taken 6/29/2024 0000 by Сергей Whitmore RN  Sensory Stimulation Regulation: lighting decreased  Medication Review/Management: medications reviewed  Taken 6/28/2024 2200 by Сергей Whitmore RN  Sleep/Rest Enhancement:   regular sleep/rest pattern promoted   room darkened   noise level reduced  Medication Review/Management: medications reviewed  Taken 6/28/2024 2000 by Сергей Whitmore RN  Sleep/Rest Enhancement: regular sleep/rest pattern  promoted  Medication Review/Management: medications reviewed  Intervention: Optimize Psychosocial Wellbeing  Recent Flowsheet Documentation  Taken 6/29/2024 0400 by Сергей Whitmore, RN  Diversional Activities: television  Taken 6/29/2024 0202 by Сергей Whitmore, RN  Diversional Activities: television  Taken 6/29/2024 0000 by Сергей Whitmore, RN  Diversional Activities: television  Taken 6/28/2024 2200 by Сергей Whitmore, RN  Supportive Measures: active listening utilized  Diversional Activities: television  Taken 6/28/2024 2000 by Сергей Whitmore RN  Supportive Measures:   active listening utilized   verbalization of feelings encouraged  Diversional Activities: television   Goal Outcome Evaluation:  Plan of Care Reviewed With: patient        Progress: no change          Pt a/o x4. VSS. Maintaining SpO2 on RA. Pt has been resting well. Prn oxycodone given x1 for back pain. Pt kept npo after midnight per orders. Surgeon to see pt in am.

## 2024-06-29 NOTE — PLAN OF CARE
Problem: Adult Inpatient Plan of Care  Goal: Plan of Care Review  Outcome: Ongoing, Progressing  Flowsheets (Taken 6/29/2024 1076)  Progress: improving  Plan of Care Reviewed With:   patient   daughter   family   Goal Outcome Evaluation:  Plan of Care Reviewed With: patient, daughter, family        Progress: improving    Pain controlled with medication pt has purwic new orders for lidocaine patch placed on back and abdominal binder on for lower back support . Pt had been given a bolus for low BP after hydralazine 10 mg ivp given for high bp 169/113 but dropped quickly so gave bolus 500 ml Ns bp back up . Pt remains confused but can be reoriented often .

## 2024-06-30 LAB
ANION GAP SERPL CALCULATED.3IONS-SCNC: 10 MMOL/L (ref 5–15)
BACTERIA UR QL AUTO: NORMAL /HPF
BILIRUB UR QL STRIP: NEGATIVE
BUN SERPL-MCNC: 17 MG/DL (ref 8–23)
BUN/CREAT SERPL: 15.2 (ref 7–25)
CALCIUM SPEC-SCNC: 8.6 MG/DL (ref 8.6–10.5)
CHLORIDE SERPL-SCNC: 103 MMOL/L (ref 98–107)
CLARITY UR: CLEAR
CO2 SERPL-SCNC: 25 MMOL/L (ref 22–29)
COLOR UR: YELLOW
CREAT SERPL-MCNC: 1.12 MG/DL (ref 0.57–1)
EGFRCR SERPLBLD CKD-EPI 2021: 48 ML/MIN/1.73
GLUCOSE SERPL-MCNC: 126 MG/DL (ref 65–99)
GLUCOSE UR STRIP-MCNC: NEGATIVE MG/DL
HGB UR QL STRIP.AUTO: NEGATIVE
HYALINE CASTS UR QL AUTO: NORMAL /LPF
KETONES UR QL STRIP: ABNORMAL
LEUKOCYTE ESTERASE UR QL STRIP.AUTO: NEGATIVE
NITRITE UR QL STRIP: NEGATIVE
PH UR STRIP.AUTO: 6.5 [PH] (ref 5–8)
POTASSIUM SERPL-SCNC: 3.8 MMOL/L (ref 3.5–5.2)
PROT UR QL STRIP: ABNORMAL
RBC # UR STRIP: NORMAL /HPF
REF LAB TEST METHOD: NORMAL
SODIUM SERPL-SCNC: 138 MMOL/L (ref 136–145)
SP GR UR STRIP: 1.01 (ref 1–1.03)
SQUAMOUS #/AREA URNS HPF: NORMAL /HPF
UROBILINOGEN UR QL STRIP: ABNORMAL
WBC # UR STRIP: NORMAL /HPF

## 2024-06-30 PROCEDURE — 99232 SBSQ HOSP IP/OBS MODERATE 35: CPT | Performed by: INTERNAL MEDICINE

## 2024-06-30 PROCEDURE — 81001 URINALYSIS AUTO W/SCOPE: CPT | Performed by: PHYSICIAN ASSISTANT

## 2024-06-30 PROCEDURE — 80048 BASIC METABOLIC PNL TOTAL CA: CPT | Performed by: INTERNAL MEDICINE

## 2024-06-30 PROCEDURE — 99231 SBSQ HOSP IP/OBS SF/LOW 25: CPT | Performed by: PHYSICIAN ASSISTANT

## 2024-06-30 PROCEDURE — 25010000002 MORPHINE PER 10 MG: Performed by: NURSE PRACTITIONER

## 2024-06-30 PROCEDURE — 25010000002 LORAZEPAM PER 2 MG: Performed by: EMERGENCY MEDICINE

## 2024-06-30 RX ORDER — HYDRALAZINE HYDROCHLORIDE 25 MG/1
25 TABLET, FILM COATED ORAL EVERY 8 HOURS SCHEDULED
Status: DISCONTINUED | OUTPATIENT
Start: 2024-06-30 | End: 2024-06-30

## 2024-06-30 RX ADMIN — ACETAMINOPHEN 1000 MG: 500 TABLET ORAL at 08:23

## 2024-06-30 RX ADMIN — OXYCODONE HYDROCHLORIDE 5 MG: 5 TABLET ORAL at 01:08

## 2024-06-30 RX ADMIN — MORPHINE SULFATE 1 MG: 2 INJECTION, SOLUTION INTRAMUSCULAR; INTRAVENOUS at 13:15

## 2024-06-30 RX ADMIN — OXYCODONE HYDROCHLORIDE 5 MG: 5 TABLET ORAL at 08:35

## 2024-06-30 RX ADMIN — PANTOPRAZOLE SODIUM 40 MG: 40 TABLET, DELAYED RELEASE ORAL at 08:23

## 2024-06-30 RX ADMIN — LATANOPROST 1 DROP: 50 SOLUTION OPHTHALMIC at 20:14

## 2024-06-30 RX ADMIN — ACETAMINOPHEN 1000 MG: 500 TABLET ORAL at 15:04

## 2024-06-30 RX ADMIN — FUROSEMIDE 20 MG: 20 TABLET ORAL at 13:02

## 2024-06-30 RX ADMIN — BUSPIRONE HYDROCHLORIDE 5 MG: 10 TABLET ORAL at 20:14

## 2024-06-30 RX ADMIN — BUSPIRONE HYDROCHLORIDE 5 MG: 10 TABLET ORAL at 08:23

## 2024-06-30 RX ADMIN — TRAZODONE HYDROCHLORIDE 50 MG: 50 TABLET ORAL at 20:14

## 2024-06-30 RX ADMIN — DORZOLAMIDE HYDROCHLORIDE AND TIMOLOL MALEATE 1 DROP: 20; 5 SOLUTION/ DROPS OPHTHALMIC at 20:14

## 2024-06-30 RX ADMIN — LIDOCAINE 1 PATCH: 4 PATCH TOPICAL at 08:23

## 2024-06-30 RX ADMIN — DORZOLAMIDE HYDROCHLORIDE AND TIMOLOL MALEATE 1 DROP: 20; 5 SOLUTION/ DROPS OPHTHALMIC at 08:24

## 2024-06-30 RX ADMIN — LORAZEPAM 0.5 MG: 2 INJECTION INTRAMUSCULAR; INTRAVENOUS at 15:37

## 2024-06-30 RX ADMIN — FERROUS SULFATE TAB 325 MG (65 MG ELEMENTAL FE) 325 MG: 325 (65 FE) TAB at 08:23

## 2024-06-30 RX ADMIN — ATORVASTATIN CALCIUM 10 MG: 10 TABLET, FILM COATED ORAL at 20:14

## 2024-06-30 RX ADMIN — SENNOSIDES AND DOCUSATE SODIUM 2 TABLET: 50; 8.6 TABLET ORAL at 20:14

## 2024-06-30 RX ADMIN — HYDRALAZINE HYDROCHLORIDE 25 MG: 25 TABLET ORAL at 11:21

## 2024-06-30 RX ADMIN — OXYCODONE HYDROCHLORIDE 5 MG: 5 TABLET ORAL at 15:04

## 2024-06-30 RX ADMIN — Medication 10 ML: at 20:15

## 2024-06-30 NOTE — PROGRESS NOTES
"    [facility]    Subjective   Mariia Roberson 1940 84 y.o. female     06/30/24    Chief complaint: Back pain    HPI  84-year-old female with T9 and T11 compression deformities.  Continues with pain.    Past Medical History:   Diagnosis Date    AAA (abdominal aortic aneurysm)     Arthritis     CHF (congestive heart failure)     Glaucoma     Hypertension     Stroke         BP (!) 180/115 (BP Location: Left arm, Patient Position: Lying)   Pulse 95   Temp 98 °F (36.7 °C) (Oral)   Resp 16   Ht 152.4 cm (60\")   Wt 44 kg (97 lb)   SpO2 93%   BMI 18.94 kg/m²      Scheduled Meds:acetaminophen, 1,000 mg, Oral, TID  atorvastatin, 10 mg, Oral, Nightly  busPIRone, 5 mg, Oral, BID  dorzolamide-timolol, 1 drop, Right Eye, BID  ferrous sulfate, 325 mg, Oral, Daily With Breakfast  [Held by provider] furosemide, 20 mg, Oral, Daily  latanoprost, 1 drop, Right Eye, Nightly  Lidocaine, 1 patch, Transdermal, Q24H  [Held by provider] losartan, 25 mg, Oral, Q24H  pantoprazole, 40 mg, Oral, Daily  senna-docusate sodium, 2 tablet, Oral, BID  sodium chloride, 500 mL, Intravenous, Once  sodium chloride, 10 mL, Intravenous, Q12H  traZODone, 50 mg, Oral, Nightly        PRN Meds:.  senna-docusate sodium **AND** polyethylene glycol **AND** bisacodyl **AND** bisacodyl    Calcium Replacement - Follow Nurse / BPA Driven Protocol    hydrALAZINE    ketorolac    LORazepam    Magnesium Standard Dose Replacement - Follow Nurse / BPA Driven Protocol    Morphine **AND** naloxone    ondansetron ODT **OR** ondansetron    oxyCODONE    Phosphorus Replacement - Follow Nurse / BPA Driven Protocol    Potassium Replacement - Follow Nurse / BPA Driven Protocol    [COMPLETED] Insert Peripheral IV **AND** sodium chloride    sodium chloride    sodium chloride    CBC:      Lab 06/29/24  0449 06/28/24  1805   WBC 10.89* 11.24*   HEMOGLOBIN 11.0* 12.2   HEMATOCRIT 35.1 38.9   PLATELETS 319 316   NEUTROS ABS " "7.26* 7.52*   IMMATURE GRANS (ABS) 0.03 0.02   LYMPHS ABS 2.20 2.63   MONOS ABS 0.81 0.68   EOS ABS 0.49* 0.31   MCV 94.9 94.9      Basic Metabolic Panel    Sodium Sodium   Date Value Ref Range Status   06/30/2024 138 136 - 145 mmol/L Final   06/29/2024 140 136 - 145 mmol/L Final   06/28/2024 141 136 - 145 mmol/L Final      Potassium Potassium   Date Value Ref Range Status   06/30/2024 3.8 3.5 - 5.2 mmol/L Final     Comment:     Slight hemolysis detected by analyzer. Result may be falsely elevated.   06/29/2024 3.5 3.5 - 5.2 mmol/L Final   06/28/2024 3.7 3.5 - 5.2 mmol/L Final     Comment:     Slight hemolysis detected by analyzer. Result may be falsely elevated.      Chloride Chloride   Date Value Ref Range Status   06/30/2024 103 98 - 107 mmol/L Final   06/29/2024 104 98 - 107 mmol/L Final   06/28/2024 103 98 - 107 mmol/L Final      Bicarbonate No results found for: \"PLASMABICARB\"   BUN BUN   Date Value Ref Range Status   06/30/2024 17 8 - 23 mg/dL Final   06/29/2024 21 8 - 23 mg/dL Final   06/28/2024 14 8 - 23 mg/dL Final      Creatinine Creatinine   Date Value Ref Range Status   06/30/2024 1.12 (H) 0.57 - 1.00 mg/dL Final   06/29/2024 1.34 (H) 0.57 - 1.00 mg/dL Final   06/28/2024 1.18 (H) 0.57 - 1.00 mg/dL Final      Calcium Calcium   Date Value Ref Range Status   06/30/2024 8.6 8.6 - 10.5 mg/dL Final   06/29/2024 8.5 (L) 8.6 - 10.5 mg/dL Final   06/28/2024 8.8 8.6 - 10.5 mg/dL Final      Glucose      No components found for: \"GLUCOSE.*\"        Intake/Output                         06/28/24 0701 - 06/29/24 0700 06/29/24 0701 - 06/30/24 0700     0701-1900 1901-0700 Total 0701-1900 1901-0700 Total                 Intake    P.O.  --  240 240  240  -- 240    IV Piggyback  250  -- 250  500  -- 500    Total Intake 250 240 490 740 -- 740       Output    Urine  200  75 275  400  1175 1575    Total Output 200 75  1575             Neurologic Exam    MDM:  Assessment and plan:  1.  Acute T9 and T11 compression " deformities.  We have discussed vertebroplasty on the patient is under agreement to proceed.  We will make arrangements for Dr. Perez to perform on Monday, Cath Lab procedure.  -Hypertension, per medicine.  2.  Cardiomegaly.  Coronary artery atherosclerosis.  3.  Upper lobe emphysema right worse than left.  4.  Neurogenic bladder  5.  Aortic aneurysm, 33 mm, mural thrombus noted throughout the aneurysm, infrarenal abdominal aortic aneurysm, level fusiform aneurysm dilation of the distal infrarenal aorta, torturous iliofemoral arteries with atherosclerosis in the internal iliac arteries, focal stenosis of the left common femoral artery, severe stenosis of the right renal artery due to atherosclerosis and thrombus with delayed enhancement of the right kidney, small wedge defects in the parenchyma of the left kidney.    Any copied data from previous notes included in the (1) HPI, (2) PE, (3) MDM and/or Assessment and Plan has been reviewed and accurate as of this date.      Lida Mack PA-C  Current Attending Provider: Naveen Daly MD

## 2024-06-30 NOTE — PLAN OF CARE
Goal Outcome Evaluation:         Pt on RA. C/o pain relieved by PRN meds. Plans for vertebroplasty tomorrow. CM following post-op treatment plan. Will continue plan of care.

## 2024-06-30 NOTE — PROGRESS NOTES
[facility]  PROGRESS NOTE    Patient Name: Mariia Roberson  : 1940  MRN: [id number]    Date of Admission: 2024  Primary Care Physician: Susan Waters APRN    Subjective   Subjective     CC:f/u back pain    HPI: No acute events overnight, patient continues to complain of back pain      Objective   Objective     Vital Signs:   Temp:  [97.4 °F (36.3 °C)-97.9 °F (36.6 °C)] 97.9 °F (36.6 °C)  Heart Rate:  [68-97] 91  Resp:  [16-18] 16  BP: ()/() 166/96  Flow (L/min):  [1] 1     Physical Exam:  Constitutional: elderly female in no acute distress, awake, alert  HENT: NCAT, mucous membranes moist  Respiratory: Clear to auscultation bilaterally, respiratory effort normal   Cardiovascular: RRR, no murmurs, rubs, or gallops  Gastrointestinal: Positive bowel sounds, soft, nontender, nondistended  Musculoskeletal: No bilateral ankle edema  Psychiatric: Appropriate affect, cooperative  Neurologic: Oriented x 3, non-focal    Results Reviewed:  LAB RESULTS:      Lab 24  0449 24  1805   WBC 10.89* 11.24*   HEMOGLOBIN 11.0* 12.2   HEMATOCRIT 35.1 38.9   PLATELETS 319 316   NEUTROS ABS 7.26* 7.52*   IMMATURE GRANS (ABS) 0.03 0.02   LYMPHS ABS 2.20 2.63   MONOS ABS 0.81 0.68   EOS ABS 0.49* 0.31   MCV 94.9 94.9         Lab 24  0449 24  1805   SODIUM 140 141   POTASSIUM 3.5 3.7   CHLORIDE 104 103   CO2 27.0 25.0   ANION GAP 9.0 13.0   BUN 21 14   CREATININE 1.34* 1.18*   EGFR 38.7* 45.1*   GLUCOSE 92 97   CALCIUM 8.5* 8.8         Lab 24  1805   TOTAL PROTEIN 6.9   ALBUMIN 3.6   GLOBULIN 3.3   ALT (SGPT) 12   AST (SGOT) 18   BILIRUBIN 0.4   ALK PHOS 114                     Brief Urine Lab Results       None            Microbiology Results Abnormal       None            MRI Thoracic Spine Without Contrast    Result Date: 2024  MRI THORACIC SPINE WO CONTRAST Date of Exam: 2024 1:41 PM EDT Indication: worsening back pain with CT  showing new T11 compression fracture.  Comparison: None available. Technique:  Routine multiplanar/multisequence sequence images of the thoracic spine were obtained without contrast administration. Findings: There is accentuation of the thoracic kyphosis inferiorly. There is a severe compression fracture at T9 and a moderate compression fracture at T11 with associated marrow edema. The other thoracic vertebral bodies appear normal in height. There is mild disc desiccation in the midthoracic spine. There is a mild disc bulge at T9-10. There is mild scattered facet arthropathy. The spinal canal and neural foramina are widely patent throughout. The spinal cord appears normal in signal throughout. The posterior paravertebral soft tissues are unremarkable.     Impression: Impression: 1.Severe compression fracture at T9 and moderate compression fracture at T11 appear to represent subacute fractures. 2.Mild degenerative changes as described above. Electronically Signed: Willie Lee MD  6/28/2024 2:50 PM EDT  Workstation ID: [id number]         Current medications:  Scheduled Meds:acetaminophen, 1,000 mg, Oral, TID  atorvastatin, 10 mg, Oral, Nightly  busPIRone, 5 mg, Oral, BID  dorzolamide-timolol, 1 drop, Right Eye, BID  ferrous sulfate, 325 mg, Oral, Daily With Breakfast  [Held by provider] furosemide, 20 mg, Oral, Daily  latanoprost, 1 drop, Right Eye, Nightly  Lidocaine, 1 patch, Transdermal, Q24H  [Held by provider] losartan, 25 mg, Oral, Q24H  pantoprazole, 40 mg, Oral, Daily  senna-docusate sodium, 2 tablet, Oral, BID  sodium chloride, 500 mL, Intravenous, Once  sodium chloride, 10 mL, Intravenous, Q12H  traZODone, 50 mg, Oral, Nightly      Continuous Infusions:   PRN Meds:.  senna-docusate sodium **AND** polyethylene glycol **AND** bisacodyl **AND** bisacodyl    Calcium Replacement - Follow Nurse / BPA Driven Protocol    hydrALAZINE    ketorolac    LORazepam    Magnesium Standard Dose Replacement - Follow Nurse /  BPA Driven Protocol    Morphine **AND** naloxone    ondansetron ODT **OR** ondansetron    oxyCODONE    Phosphorus Replacement - Follow Nurse / BPA Driven Protocol    Potassium Replacement - Follow Nurse / BPA Driven Protocol    [COMPLETED] Insert Peripheral IV **AND** sodium chloride    sodium chloride    sodium chloride    Assessment & Plan   Assessment & Plan     Active Hospital Problems    Diagnosis  POA    **Mult fractures of thoracic spine, closed [S22.009A]  Yes    Multiple fractures of thoracic spine [S22.009A]  Yes    Intractable pain [R52]  Unknown    HTN (hypertension) [I10]  Unknown      Resolved Hospital Problems   No resolved problems to display.        Brief Hospital Course to date:  Mariia Roberson is a 84 y.o. female with history of hypertension, CVA, CHF, glaucoma, AAA who presents to the ED with intractable back pain found to have multiple fractures of the thoracic spine     Subacute compression fractures at T9 and T11   Intractable back pain  -Neurosurgery following, tentatively plan for vertebroplasty, timing TBD  -Continue pain control  -PT/OT to evaluate     Hypertension  -BP was previously low, currently elevated  -resume home antihypertensives/diuretics  -Of note, home lisinopril has been discontinued secondary to cough     Renal insufficiency  -Cr with slight bump 1.18-->1.34  -s/p gentle fluids, closely monitor  -holding Lasix and losartan     Hx of CHF (data deficient)  -seems compensated, hold lasix sec to hypotension and renal insuffiencey     CVA  -on statin    Expected Discharge Location and Transportation: TBD  Expected Discharge   Expected Discharge Date: 7/1/2024; Expected Discharge Time:      VTE Prophylaxis:  Mechanical VTE prophylaxis orders are present.         AM-PAC 6 Clicks Score (PT): 17 (06/29/24 2000)    CODE STATUS:   Code Status and Medical Interventions:   Ordered at: 06/28/24 1610     Medical Intervention Limits:    No intubation (DNI)     Level Of Support Discussed  With:    Patient    Next of Kin (If No Surrogate)     Code Status (Patient has no pulse and is not breathing):    No CPR (Do Not Attempt to Resuscitate)     Medical Interventions (Patient has pulse or is breathing):    Limited Support     Comments:    daughter       Naveen Daly MD  06/30/24

## 2024-06-30 NOTE — CASE MANAGEMENT/SOCIAL WORK
Discharge Planning Assessment  [facility]     Patient Name: Mariia Roberson  MRN: [id number]  Today's Date: 6/30/2024    Admit Date: 6/28/2024    Plan: Home with Home Health vs IPR   Discharge Needs Assessment       Row Name 06/30/24 1502       Living Environment    People in Home child(lionel), adult    Name(s) of People in Home Didi whitlock    Current Living Arrangements home    Primary Care Provided by child(lionel)    Provides Primary Care For no one, unable/limited ability to care for self    Family Caregiver if Needed child(lionel), adult    Family Caregiver Names Didi whitlock    Quality of Family Relationships helpful;involved;supportive    Able to Return to Prior Arrangements other (see comments)  TBD       Transition Planning    Patient/Family Anticipates Transition to home with help/services    Patient/Family Anticipated Services at Transition home health care    Transportation Anticipated family or friend will provide       Discharge Needs Assessment    Readmission Within the Last 30 Days no previous admission in last 30 days    Current Outpatient/Agency/Support Group homecare agency    Equipment Currently Used at Home walker, rolling;wheelchair;cane, quad    Concerns to be Addressed discharge planning    Anticipated Changes Related to Illness inability to care for self    Outpatient/Agency/Support Group Needs homecare agency    Discharge Facility/Level of Care Needs home with home health    Current Discharge Risk chronically ill;physical impairment                   Discharge Plan       Row Name 06/30/24 1504       Plan    Plan Home with Home Health vs IPR    Patient/Family in Agreement with Plan yes    Plan Comments I have met with Ms. Roberson at the bedside today to initiate discharge planning.  She states that she lives in her daughter's home in [place] [place] [facility].  She reports that she needs assistance with activities of daily living and uses a rollator, wheelchair and Qcane to assist with  "mobility.  She currently receives [facility] services.  I have confirmed that her PCP is DENISA Levy and her insurance is Medicare and [place] federal.  Per Provider note, \"Neurosurgery following, tentatively plan for vertebroplasty, timing TBD\"  CM will cont to follow evolving plan of care and assist with discharge planning as recommendations become available.    Final Discharge Disposition Code 06 - home with home health care                  Continued Care and Services - Admitted Since 6/28/2024       Home Medical Care       Service Provider Request Status Selected Services Address Phone Fax Patient Preferred    [facility] [address], [address] [address] [phone] --                  Expected Discharge Date and Time       Expected Discharge Date Expected Discharge Time    Jul 1, 2024            Demographic Summary       Row Name 06/30/24 1501       General Information    Admission Type inpatient    Arrived From home    Referral Source admission list    Reason for Consult discharge planning       Contact Information    Permission Granted to Share Info With                    Functional Status       Row Name 06/30/24 1501       Functional Status, IADL    Medications assistive person    Meal Preparation assistive person    Housekeeping assistive person    Laundry assistive person    Shopping assistive person       Employment/    Employment Status retired                   Psychosocial    No documentation.                  Abuse/Neglect    No documentation.                  Legal    No documentation.                  Substance Abuse    No documentation.                  Patient Forms    No documentation.                     Payal Portillo RN    "

## 2024-06-30 NOTE — PLAN OF CARE
Patient c/o intermittent mid back pain / HA, adequately controlled with PO analgesics. Incontinent in B&B, UOP ADQ via exdternal catheter. VSS on RA. Resting in bed with no sx of acute distress at this time. Call light in reach.

## 2024-06-30 NOTE — NURSING NOTE
IV pulled out due to infiltrations. Patient adamantly refused for another IV insertion attempt. Provider notified.

## 2024-07-01 PROBLEM — M80.08XA: Status: ACTIVE | Noted: 2024-06-28

## 2024-07-01 LAB
ANION GAP SERPL CALCULATED.3IONS-SCNC: 14 MMOL/L (ref 5–15)
BUN SERPL-MCNC: 18 MG/DL (ref 8–23)
BUN/CREAT SERPL: 14.8 (ref 7–25)
CALCIUM SPEC-SCNC: 8.9 MG/DL (ref 8.6–10.5)
CHLORIDE SERPL-SCNC: 102 MMOL/L (ref 98–107)
CO2 SERPL-SCNC: 25 MMOL/L (ref 22–29)
CREAT SERPL-MCNC: 1.22 MG/DL (ref 0.57–1)
EGFRCR SERPLBLD CKD-EPI 2021: 43.3 ML/MIN/1.73
GLUCOSE SERPL-MCNC: 81 MG/DL (ref 65–99)
POTASSIUM SERPL-SCNC: 4 MMOL/L (ref 3.5–5.2)
SODIUM SERPL-SCNC: 141 MMOL/L (ref 136–145)

## 2024-07-01 PROCEDURE — 22513 PERQ VERTEBRAL AUGMENTATION: CPT | Performed by: RADIOLOGY

## 2024-07-01 PROCEDURE — 25010000002 MIDAZOLAM PER 1 MG: Performed by: RADIOLOGY

## 2024-07-01 PROCEDURE — 22515 PERQ VERTEBRAL AUGMENTATION: CPT | Performed by: RADIOLOGY

## 2024-07-01 PROCEDURE — S0260 H&P FOR SURGERY: HCPCS

## 2024-07-01 PROCEDURE — 99232 SBSQ HOSP IP/OBS MODERATE 35: CPT | Performed by: INTERNAL MEDICINE

## 2024-07-01 PROCEDURE — 99152 MOD SED SAME PHYS/QHP 5/>YRS: CPT | Performed by: RADIOLOGY

## 2024-07-01 PROCEDURE — 25010000002 FENTANYL CITRATE (PF) 50 MCG/ML SOLUTION: Performed by: RADIOLOGY

## 2024-07-01 PROCEDURE — C1713 ANCHOR/SCREW BN/BN,TIS/BN: HCPCS | Performed by: RADIOLOGY

## 2024-07-01 PROCEDURE — 0PU43JZ SUPPLEMENT THORACIC VERTEBRA WITH SYNTHETIC SUBSTITUTE, PERCUTANEOUS APPROACH: ICD-10-PCS | Performed by: RADIOLOGY

## 2024-07-01 PROCEDURE — 0PS43ZZ REPOSITION THORACIC VERTEBRA, PERCUTANEOUS APPROACH: ICD-10-PCS | Performed by: RADIOLOGY

## 2024-07-01 PROCEDURE — 99153 MOD SED SAME PHYS/QHP EA: CPT | Performed by: RADIOLOGY

## 2024-07-01 PROCEDURE — 80048 BASIC METABOLIC PNL TOTAL CA: CPT | Performed by: INTERNAL MEDICINE

## 2024-07-01 DEVICE — CMT BONE VSTEADY RO: Type: IMPLANTABLE DEVICE | Site: SPINE THORACIC | Status: FUNCTIONAL

## 2024-07-01 RX ORDER — FENTANYL CITRATE 50 UG/ML
INJECTION, SOLUTION INTRAMUSCULAR; INTRAVENOUS
Status: DISCONTINUED | OUTPATIENT
Start: 2024-07-01 | End: 2024-07-01 | Stop reason: HOSPADM

## 2024-07-01 RX ORDER — LIDOCAINE HYDROCHLORIDE 10 MG/ML
INJECTION, SOLUTION EPIDURAL; INFILTRATION; INTRACAUDAL; PERINEURAL
Status: DISCONTINUED | OUTPATIENT
Start: 2024-07-01 | End: 2024-07-01 | Stop reason: HOSPADM

## 2024-07-01 RX ORDER — MIDAZOLAM HYDROCHLORIDE 1 MG/ML
INJECTION INTRAMUSCULAR; INTRAVENOUS
Status: DISCONTINUED | OUTPATIENT
Start: 2024-07-01 | End: 2024-07-01 | Stop reason: HOSPADM

## 2024-07-01 RX ADMIN — BUSPIRONE HYDROCHLORIDE 5 MG: 10 TABLET ORAL at 20:31

## 2024-07-01 RX ADMIN — FERROUS SULFATE TAB 325 MG (65 MG ELEMENTAL FE) 325 MG: 325 (65 FE) TAB at 14:02

## 2024-07-01 RX ADMIN — LIDOCAINE 1 PATCH: 4 PATCH TOPICAL at 09:31

## 2024-07-01 RX ADMIN — DORZOLAMIDE HYDROCHLORIDE AND TIMOLOL MALEATE 1 DROP: 20; 5 SOLUTION/ DROPS OPHTHALMIC at 08:53

## 2024-07-01 RX ADMIN — OXYCODONE HYDROCHLORIDE 5 MG: 5 TABLET ORAL at 20:39

## 2024-07-01 RX ADMIN — LATANOPROST 1 DROP: 50 SOLUTION OPHTHALMIC at 20:33

## 2024-07-01 RX ADMIN — OXYCODONE HYDROCHLORIDE 5 MG: 5 TABLET ORAL at 09:30

## 2024-07-01 RX ADMIN — SENNOSIDES AND DOCUSATE SODIUM 2 TABLET: 50; 8.6 TABLET ORAL at 20:31

## 2024-07-01 RX ADMIN — PANTOPRAZOLE SODIUM 40 MG: 40 TABLET, DELAYED RELEASE ORAL at 14:02

## 2024-07-01 RX ADMIN — DORZOLAMIDE HYDROCHLORIDE AND TIMOLOL MALEATE 1 DROP: 20; 5 SOLUTION/ DROPS OPHTHALMIC at 20:33

## 2024-07-01 RX ADMIN — LOSARTAN POTASSIUM 25 MG: 25 TABLET, FILM COATED ORAL at 08:56

## 2024-07-01 RX ADMIN — BUSPIRONE HYDROCHLORIDE 5 MG: 10 TABLET ORAL at 14:02

## 2024-07-01 RX ADMIN — FUROSEMIDE 20 MG: 20 TABLET ORAL at 08:56

## 2024-07-01 RX ADMIN — Medication 10 ML: at 08:52

## 2024-07-01 RX ADMIN — TRAZODONE HYDROCHLORIDE 50 MG: 50 TABLET ORAL at 20:31

## 2024-07-01 RX ADMIN — ATORVASTATIN CALCIUM 10 MG: 10 TABLET, FILM COATED ORAL at 20:31

## 2024-07-01 NOTE — PROGRESS NOTES
"NAME: LORRIE RHODES  : 1940  PCP: Susan Waters APRN  ADMITTING PHYSICIAN: Judy Blackwell DO    DATE OF ADMISSION:  2024  DATE OF SERVICE: 2024    HOSPITAL DAY:  2 days    CC:   Chief Complaint   Patient presents with    Back Pain       HISTORY OF PRESENT ILLNESS:  84 y.o. female with T9 and T11 compression deformities.  Patient continues with pain.        LABS:  Lab Results   Component Value Date    WBC 10.89 (H) 2024    HGB 11.0 (L) 2024    HCT 35.1 2024    MCV 94.9 2024     2024     Lab Results   Component Value Date    GLUCOSE 126 (H) 2024    CALCIUM 8.6 2024     2024    K 3.8 2024    CO2 25.0 2024     2024    BUN 17 2024    CREATININE 1.12 (H) 2024    EGFRIFAFRI 55 2024    BCR 15.2 2024    ANIONGAP 10.0 2024     No results found for: \"HGBA1C\"  No results found for: \"CHOL\", \"CHLPL\", \"TRIG\", \"HDL\", \"LDL\", \"LDLDIRECT\"    CURRENT MEDS:  Current Facility-Administered Medications   Medication Dose Route Frequency Provider Last Rate Last Admin    atorvastatin (LIPITOR) tablet 10 mg  10 mg Oral Nightly Yeni Henry APRN   10 mg at 24    sennosides-docusate (PERICOLACE) 8.6-50 MG per tablet 2 tablet  2 tablet Oral BID Yeni Henry APRN   2 tablet at 24    And    polyethylene glycol (MIRALAX) packet 17 g  17 g Oral Daily PRN Yeni Henry APRN        And    bisacodyl (DULCOLAX) EC tablet 5 mg  5 mg Oral Daily PRN Yeni Henry APRN        And    bisacodyl (DULCOLAX) suppository 10 mg  10 mg Rectal Daily PRN Yeni Henry APRN        busPIRone (BUSPAR) tablet 5 mg  5 mg Oral BID Yeni Henry APRN   5 mg at 24    Calcium Replacement - Follow Nurse / BPA Driven Protocol   Does not apply PRN Yeni Henry APRN        dorzolamide-timolol (COSOPT) ophthalmic solution 1 drop  1 drop Right Eye BID Carl, " DENISA Venegas   1 drop at 07/01/24 0853    ferrous sulfate tablet 325 mg  325 mg Oral Daily With Breakfast Yeni Henry APRN   325 mg at 06/30/24 0823    furosemide (LASIX) tablet 20 mg  20 mg Oral Daily Naveen Daly MD   20 mg at 07/01/24 0856    hydrALAZINE (APRESOLINE) injection 10 mg  10 mg Intravenous Q6H PRN Yeni Henry APRN   10 mg at 06/29/24 1302    latanoprost (XALATAN) 0.005 % ophthalmic solution 1 drop  1 drop Right Eye Nightly Yeni Henry APRN   1 drop at 06/30/24 2014    Lidocaine 4 % 1 patch  1 patch Transdermal Q24H Lida Mack PA-C   1 patch at 07/01/24 0931    LORazepam (ATIVAN) injection 0.5 mg  0.5 mg Intravenous Q4H PRN Jules Dorsey MD   0.5 mg at 06/30/24 1537    losartan (COZAAR) tablet 25 mg  25 mg Oral Q24H Naveen Daly MD   25 mg at 07/01/24 0856    Magnesium Standard Dose Replacement - Follow Nurse / BPA Driven Protocol   Does not apply PRN Yeni Henry APRN        morphine injection 1 mg  1 mg Intravenous Q4H PRN Yeni Henry APRN   1 mg at 06/30/24 1315    And    naloxone (NARCAN) injection 0.4 mg  0.4 mg Intravenous Q5 Min PRN Yeni Henry APRNNEKA        ondansetron ODT (ZOFRAN-ODT) disintegrating tablet 4 mg  4 mg Oral Q6H PRN Yeni Henry APRN        Or    ondansetron (ZOFRAN) injection 4 mg  4 mg Intravenous Q6H PRN Yeni Henry APRN        oxyCODONE (ROXICODONE) immediate release tablet 5 mg  5 mg Oral Q6H PRN Yeni Henry APRN   5 mg at 07/01/24 0930    pantoprazole (PROTONIX) EC tablet 40 mg  40 mg Oral Daily Yeni Henry APRN   40 mg at 06/30/24 0823    Phosphorus Replacement - Follow Nurse / BPA Driven Protocol   Does not apply PRN Yeni Henry APRN        Potassium Replacement - Follow Nurse / BPA Driven Protocol   Does not apply PRN Yeni Henry APRN        sodium chloride 0.9 % bolus 500 mL  500 mL Intravenous Once Naveen Daly  mL/hr at 06/29/24  1503 Rate Change at 06/29/24 1503    sodium chloride 0.9 % flush 10 mL  10 mL Intravenous PRN Jules Dorsey MD        sodium chloride 0.9 % flush 10 mL  10 mL Intravenous Q12H Yeni Hnery APRN   10 mL at 07/01/24 0852    sodium chloride 0.9 % flush 10 mL  10 mL Intravenous PRN Yeni Henry APRN        sodium chloride 0.9 % infusion 40 mL  40 mL Intravenous PRN Yeni Henry APRN        traZODone (DESYREL) tablet 50 mg  50 mg Oral Nightly Yeni Henry APRN   50 mg at 06/30/24 2014       PHYSICAL EXAM:  Vitals:    07/01/24 0856   BP: (!) 170/133   Pulse: 109   Resp:    Temp:    SpO2:         General: Elderly female, visibly uncomfortable, laying in bed    Neuro: AAOx3.  Nonfocal neurological exam.    ASSESSMENT/PLAN:  1.  Acute T9 and T11 compression deformities.  We have discussed vertebroplasty and the patient is in agreement to proceed.  Dr. Perez to perform today, Cath Lab procedure.    2.  Hypertension, per medicine.    3.  Cardiomegaly.  Coronary artery atherosclerosis.    4.  Upper lobe emphysema right worse than left.    5.  Neurogenic bladder    6.  Aortic aneurysm, 33 mm, mural thrombus noted throughout the aneurysm, infrarenal abdominal aortic aneurysm, level fusiform aneurysm dilation of the distal infrarenal aorta, torturous iliofemoral arteries with atherosclerosis in the internal iliac arteries, focal stenosis of the left common femoral artery, severe stenosis of the right renal artery due to atherosclerosis and thrombus with delayed enhancement of the right kidney, small wedge defects in the parenchyma of the left kidney.    Copied text and portions of the note have been reviewed and are accurate as of 7/1/2024.

## 2024-07-01 NOTE — PLAN OF CARE
Per reporting nurse, patient was restless / anxious on day shift. Resting in bed with no sx of acute distress at this time. T&R refused due to back pain. DBP remains elevated. Current /100 (). Will cont to mx. Call light in reach.

## 2024-07-01 NOTE — PROGRESS NOTES
Mariia Benitezkavon  1940    Patient continues with severe back pain.  She is not able to participate with therapy due to her pain.  She is acceptable to vertebroplasty today.    Lida Mack PA-C

## 2024-07-01 NOTE — BRIEF OP NOTE
CV VERTEBROPLASTY THORACIC  Progress Note    Mariia Roberson  7/1/2024    Pre-op Diagnosis:      * Closed fracture of multiple thoracic vertebrae, initial encounter [S22.009A]     * Osteoporosis with pathological fracture of thoracic vertebra [M80.08XA]       Post-Op Diagnosis Codes:     * Closed fracture of multiple thoracic vertebrae, initial encounter [S22.009A]     * Osteoporosis with pathological fracture of thoracic vertebra [M80.08XA]    Procedure/CPT® Codes:        Procedure(s):  IR vertebroplasty thoracic with fluoroscopy, T9 and T11              Surgeon(s):  Javier Perez MD    Anesthesia: * No anesthesia type entered *    Staff:   Scrub Person: Angel Cavanaugh  Documenter: Roly Shannon  Invasive Nurse: Ese Escudero RN         Estimated Blood Loss: minimal    Urine Voided: * No values recorded between 7/1/2024 10:02 AM and 7/1/2024 10:56 AM *    Specimens:                None          Drains:   External Urinary Catheter (Active)   Daily Indications Daily output 07/01/24 0723   Site Assessment Skin intact;Clean 07/01/24 0723   Application/Removal external catheter applied 07/01/24 0723   Collection Container Wall suction 07/01/24 0723   Wall suction (mmHG) 125 mmHG 06/30/24 1831   Securement Method Securing device 07/01/24 0723   Catheter care complete Yes 07/01/24 0723   Output (mL) 250 mL 07/01/24 0621       Findings: The patient's acute T9 and T11 compression fractures were treated with percutaneous kyphoplasty.    Complications: None apparent.    Recommendations: Bedrest for 1 hour, then okay to work with PT/OT ad nelli.      Javier Perez MD     Date: 7/1/2024  Time: 10:58 EDT

## 2024-07-01 NOTE — PROGRESS NOTES
[facility] Medicine Services  PROGRESS NOTE    Patient Name: Mariia Roberson  : 1940  MRN: [id number]    Date of Admission: 2024  Primary Care Physician: Susan Waters APRN    Subjective   Subjective     CC:  Compression fracture, back pain     HPI:  No acute events. States she feels good following procedure. No pain.       Objective   Objective     Vital Signs:   Temp:  [97.5 °F (36.4 °C)-98.1 °F (36.7 °C)] 97.6 °F (36.4 °C)  Heart Rate:  [] 87  Resp:  [16-20] 16  BP: (118-173)/() 144/85     Physical Exam:  Constitutional: No acute distress, awake, alert  Respiratory: Clear to auscultation bilaterally, respiratory effort normal   Cardiovascular: RRR, no murmurs  Gastrointestinal: Positive bowel sounds, soft, nontender, nondistended  Musculoskeletal: No bilateral ankle edema  Psychiatric: Appropriate affect, cooperative  Neurologic: Oriented x 3, strength symmetric in all extremities, speech clear  Skin: No rashes      Results Reviewed:  LAB RESULTS:      Lab 24  0449 24  1805   WBC 10.89* 11.24*   HEMOGLOBIN 11.0* 12.2   HEMATOCRIT 35.1 38.9   PLATELETS 319 316   NEUTROS ABS 7.26* 7.52*   IMMATURE GRANS (ABS) 0.03 0.02   LYMPHS ABS 2.20 2.63   MONOS ABS 0.81 0.68   EOS ABS 0.49* 0.31   MCV 94.9 94.9         Lab 24  0936 24  0449 24  1805   SODIUM 138 140 141   POTASSIUM 3.8 3.5 3.7   CHLORIDE 103 104 103   CO2 25.0 27.0 25.0   ANION GAP 10.0 9.0 13.0   BUN 17 21 14   CREATININE 1.12* 1.34* 1.18*   EGFR 48.0* 38.7* 45.1*   GLUCOSE 126* 92 97   CALCIUM 8.6 8.5* 8.8         Lab 24  1805   TOTAL PROTEIN 6.9   ALBUMIN 3.6   GLOBULIN 3.3   ALT (SGPT) 12   AST (SGOT) 18   BILIRUBIN 0.4   ALK PHOS 114                     Brief Urine Lab Results  (Last result in the past 365 days)        Color   Clarity   Blood   Leuk Est   Nitrite   Protein   CREAT   Urine HCG        24 1505 Yellow   Clear   Negative   Negative   Negative   30  mg/dL (1+)                   Microbiology Results Abnormal       None            Invasive peripheral vascular study    Result Date: 7/1/2024  Clinical indication: 84-year-old female admitted with intractable back pain, found to have acute T9 and T11 compression fractures. Primary diagnosis: 1.  Pathologic fracture of vertebral body secondary to osteoporosis 2.  Osteoporosis : Dr. Javier Perez. Access: Percutaneous transpedicular approach into the T9 and T11 vertebral body Estimated blood loss: Negligible Conscious sedation: Moderate sedation was provided by me for a total of 41 minutes.  Physical vitals were continuously monitored by an independently trained observer.  A total of 2 mg of Versed and 100 ug of fentanyl were administered intravenously. Procedures: 1.  Fluoroscopic guided percutaneous T9 kyphoplasty 2.  Fluoroscopic guided percutaneous T11 kyphoplasty 3.  Conscious sedation Complications: None apparent Technique:  Formal written consent for the procedure was obtained from the patient and family after explaining risks to include bleeding, infection, aberrant cement extrusion, spinal nerve injury, and failure to relieve pain.  The patient was placed prone on the biplane angiography table and the thoracolumbar region was prepped and draped in the usual, sterile fashion.  Local anesthesia with 1% lidocaine infiltration was achieved.  Additionally, intravenous fentanyl and Versed were given for patient comfort throughout the procedure, the details of which are documented in the nursing record.  Utilizing biplane fluoroscopic guidance, a single G21 trocar needle was placed into the T9 vertebral body via a right transpedicular approach.  A cavity was then created within the central portions of the T9 vertebral body utilizing a flex drill.  Next, approximately 3 mL of of a sterile methylmethacrylate/barium cement solution was instilled into the T9 vertebral body without difficulty.  The T9 vertebra was  "a near \"vertebral plana\" deformity, but there was good distribution of cement throughout the T9 vertebra.  Next, 2 G21 trocar needles were placed into the T11 vertebral body via transpedicular approach.  Augmentation of the T11 vertebra was then performed with G21 balloons.  Next, approximately 8 mL of a sterile methylmethacrylate/cement solution was instilled into the T11 vertebra without difficulty.  There was excellent distribution of cement throughout the T11 vertebra.  At the end of the procedure, all needles were removed and sterile dressing was applied to the puncture site.  The patient was transferred back to her floor bed for postprocedural care/recovery. Impression: Technically successful fluoroscopic percutaneous kyphoplasty of T9 and T11 compression fractures.          Current medications:  Scheduled Meds:atorvastatin, 10 mg, Oral, Nightly  busPIRone, 5 mg, Oral, BID  dorzolamide-timolol, 1 drop, Right Eye, BID  ferrous sulfate, 325 mg, Oral, Daily With Breakfast  furosemide, 20 mg, Oral, Daily  latanoprost, 1 drop, Right Eye, Nightly  Lidocaine, 1 patch, Transdermal, Q24H  losartan, 25 mg, Oral, Q24H  pantoprazole, 40 mg, Oral, Daily  senna-docusate sodium, 2 tablet, Oral, BID  sodium chloride, 500 mL, Intravenous, Once  sodium chloride, 10 mL, Intravenous, Q12H  traZODone, 50 mg, Oral, Nightly      Continuous Infusions:   PRN Meds:.  senna-docusate sodium **AND** polyethylene glycol **AND** bisacodyl **AND** bisacodyl    Calcium Replacement - Follow Nurse / BPA Driven Protocol    hydrALAZINE    [Transfer Hold] LORazepam    Magnesium Standard Dose Replacement - Follow Nurse / BPA Driven Protocol    Morphine **AND** naloxone    ondansetron ODT **OR** ondansetron    oxyCODONE    Phosphorus Replacement - Follow Nurse / BPA Driven Protocol    Potassium Replacement - Follow Nurse / BPA Driven Protocol    [COMPLETED] Insert Peripheral IV **AND** sodium chloride    sodium chloride    sodium " chloride    Assessment & Plan   Assessment & Plan     Active Hospital Problems    Diagnosis  POA    **Mult fractures of thoracic spine, closed [S22.009A]  Yes    Multiple fractures of thoracic spine [S22.009A]  Yes    Intractable pain [R52]  Unknown    HTN (hypertension) [I10]  Unknown    Osteoporosis with pathological fracture of thoracic vertebra [M80.08XA]  Unknown      Resolved Hospital Problems   No resolved problems to display.        Brief Hospital Course to date:  Mariia Roberson is a 84 y.o. female with history of hypertension, CVA, CHF, glaucoma, AAA who presents to the ED with intractable back pain found to have multiple fractures of the thoracic spine     Subacute compression fractures at T9 and T11   Intractable back pain  -Neurosurgery consulted -- s/p vertebroplasty 7/1 with Dr. Perez   -Continue pain control  -PT/OT      Hypertension  -BP was previously low, but trended up   - continue home losartan, lasix  -Of note, home lisinopril has been discontinued secondary to cough     Renal insufficiency  -Cr with slight bump 1.18-->1.34. Improved with fluids  - repeat labs pending      Hx of CHF (data deficient)  -seems compensated, lasix resumed -- monitor.      CVA  -on statin    Expected Discharge Location and Transportation: TBD; likely home with home health - lives with daughter   Expected Discharge   Expected Discharge Date: 7/1/2024; Expected Discharge Time:      VTE Prophylaxis:  Mechanical VTE prophylaxis orders are present.         AM-PAC 6 Clicks Score (PT): (P) 12 (07/01/24 0800)    CODE STATUS:   Code Status and Medical Interventions:   Ordered at: 06/28/24 1610     Medical Intervention Limits:    No intubation (DNI)     Level Of Support Discussed With:    Patient    Next of Kin (If No Surrogate)     Code Status (Patient has no pulse and is not breathing):    No CPR (Do Not Attempt to Resuscitate)     Medical Interventions (Patient has pulse or is breathing):    Limited Support     Comments:     daughter       Judy Blackwell, DO  07/01/24

## 2024-07-02 LAB
ANION GAP SERPL CALCULATED.3IONS-SCNC: 13 MMOL/L (ref 5–15)
BUN SERPL-MCNC: 19 MG/DL (ref 8–23)
BUN/CREAT SERPL: 17.3 (ref 7–25)
CALCIUM SPEC-SCNC: 9 MG/DL (ref 8.6–10.5)
CHLORIDE SERPL-SCNC: 101 MMOL/L (ref 98–107)
CO2 SERPL-SCNC: 25 MMOL/L (ref 22–29)
CREAT SERPL-MCNC: 1.1 MG/DL (ref 0.57–1)
DEPRECATED RDW RBC AUTO: 46 FL (ref 37–54)
EGFRCR SERPLBLD CKD-EPI 2021: 49 ML/MIN/1.73
ERYTHROCYTE [DISTWIDTH] IN BLOOD BY AUTOMATED COUNT: 13.6 % (ref 12.3–15.4)
GLUCOSE SERPL-MCNC: 98 MG/DL (ref 65–99)
HCT VFR BLD AUTO: 38.7 % (ref 34–46.6)
HGB BLD-MCNC: 12.2 G/DL (ref 12–15.9)
MCH RBC QN AUTO: 29 PG (ref 26.6–33)
MCHC RBC AUTO-ENTMCNC: 31.5 G/DL (ref 31.5–35.7)
MCV RBC AUTO: 91.9 FL (ref 79–97)
PLATELET # BLD AUTO: 342 10*3/MM3 (ref 140–450)
PMV BLD AUTO: 10.7 FL (ref 6–12)
POTASSIUM SERPL-SCNC: 3.7 MMOL/L (ref 3.5–5.2)
RBC # BLD AUTO: 4.21 10*6/MM3 (ref 3.77–5.28)
SODIUM SERPL-SCNC: 139 MMOL/L (ref 136–145)
WBC NRBC COR # BLD AUTO: 12.05 10*3/MM3 (ref 3.4–10.8)

## 2024-07-02 PROCEDURE — 25010000002 MORPHINE PER 10 MG

## 2024-07-02 PROCEDURE — 80048 BASIC METABOLIC PNL TOTAL CA: CPT | Performed by: INTERNAL MEDICINE

## 2024-07-02 PROCEDURE — 97166 OT EVAL MOD COMPLEX 45 MIN: CPT

## 2024-07-02 PROCEDURE — 85027 COMPLETE CBC AUTOMATED: CPT | Performed by: INTERNAL MEDICINE

## 2024-07-02 PROCEDURE — 63710000001 ONDANSETRON ODT 4 MG TABLET DISPERSIBLE

## 2024-07-02 PROCEDURE — 99232 SBSQ HOSP IP/OBS MODERATE 35: CPT | Performed by: NURSE PRACTITIONER

## 2024-07-02 PROCEDURE — 97162 PT EVAL MOD COMPLEX 30 MIN: CPT

## 2024-07-02 PROCEDURE — 97116 GAIT TRAINING THERAPY: CPT

## 2024-07-02 RX ADMIN — LIDOCAINE 1 PATCH: 4 PATCH TOPICAL at 08:02

## 2024-07-02 RX ADMIN — MORPHINE SULFATE 1 MG: 2 INJECTION, SOLUTION INTRAMUSCULAR; INTRAVENOUS at 00:03

## 2024-07-02 RX ADMIN — TRAZODONE HYDROCHLORIDE 50 MG: 50 TABLET ORAL at 20:27

## 2024-07-02 RX ADMIN — SENNOSIDES AND DOCUSATE SODIUM 2 TABLET: 50; 8.6 TABLET ORAL at 08:02

## 2024-07-02 RX ADMIN — DORZOLAMIDE HYDROCHLORIDE AND TIMOLOL MALEATE 1 DROP: 20; 5 SOLUTION/ DROPS OPHTHALMIC at 20:28

## 2024-07-02 RX ADMIN — Medication 10 ML: at 00:02

## 2024-07-02 RX ADMIN — LOSARTAN POTASSIUM 25 MG: 25 TABLET, FILM COATED ORAL at 08:02

## 2024-07-02 RX ADMIN — BUSPIRONE HYDROCHLORIDE 5 MG: 10 TABLET ORAL at 20:27

## 2024-07-02 RX ADMIN — BUSPIRONE HYDROCHLORIDE 5 MG: 10 TABLET ORAL at 08:02

## 2024-07-02 RX ADMIN — PANTOPRAZOLE SODIUM 40 MG: 40 TABLET, DELAYED RELEASE ORAL at 08:02

## 2024-07-02 RX ADMIN — ONDANSETRON 4 MG: 4 TABLET, ORALLY DISINTEGRATING ORAL at 00:02

## 2024-07-02 RX ADMIN — Medication 10 ML: at 08:03

## 2024-07-02 RX ADMIN — FUROSEMIDE 20 MG: 20 TABLET ORAL at 08:02

## 2024-07-02 RX ADMIN — OXYCODONE HYDROCHLORIDE 5 MG: 5 TABLET ORAL at 08:10

## 2024-07-02 RX ADMIN — SENNOSIDES AND DOCUSATE SODIUM 2 TABLET: 50; 8.6 TABLET ORAL at 20:27

## 2024-07-02 RX ADMIN — FERROUS SULFATE TAB 325 MG (65 MG ELEMENTAL FE) 325 MG: 325 (65 FE) TAB at 08:02

## 2024-07-02 RX ADMIN — DORZOLAMIDE HYDROCHLORIDE AND TIMOLOL MALEATE 1 DROP: 20; 5 SOLUTION/ DROPS OPHTHALMIC at 09:39

## 2024-07-02 RX ADMIN — ATORVASTATIN CALCIUM 10 MG: 10 TABLET, FILM COATED ORAL at 20:28

## 2024-07-02 RX ADMIN — Medication 10 ML: at 20:28

## 2024-07-02 RX ADMIN — MORPHINE SULFATE 1 MG: 2 INJECTION, SOLUTION INTRAMUSCULAR; INTRAVENOUS at 12:45

## 2024-07-02 RX ADMIN — LATANOPROST 1 DROP: 50 SOLUTION OPHTHALMIC at 20:28

## 2024-07-02 NOTE — PLAN OF CARE
Goal Outcome Evaluation:   Up to BSC with staff, unable to ambulate more than a few steps do to increased back pain. Abdominal binder, IV and PO meds used to help keep pain at tolerable level to pt. Voiding without difficulty.

## 2024-07-02 NOTE — PROGRESS NOTES
Malnutrition Severity Assessment    Patient Name:  Mariia Roberson  YOB: 1940  MRN: [id number]  Admit Date:  6/28/2024    Patient meets criteria for : Severe Malnutrition      Malnutrition Severity Assessment  Malnutrition Type: Chronic Disease - Related Malnutrition  Malnutrition Type (Last 8 Hours)       Malnutrition Severity Assessment       Row Name 07/02/24 1747       Malnutrition Severity Assessment    Malnutrition Type Chronic Disease - Related Malnutrition      Row Name 07/02/24 1747       Muscle Loss    Loss of Muscle Mass Findings Severe    Evangelical Region Severe - deep hollowing/scooping, lack of muscle to touch, facial bones well defined    Clavicle Bone Region Severe - protruding prominent bone    Dorsal Hand Region Severe - prominent depression    Patellar Region Severe - prominent bone, square looking, very little muscle definition    Anterior Thigh Region Severe - line/depression along thigh, obviously thin    Posterior Calf Region Severe - thin with very little definition/firmness      Row Name 07/02/24 1747       Fat Loss    Subcutaneous Fat Loss Findings Severe    Orbital Region  Severe - pronounced hollowness/depression, dark circles, loose saggy skin    Upper Arm Region Severe - mostly skin, very little space between folds, fingers touch      Row Name 07/02/24 1747       Criteria Met (Must meet criteria for severity in at least 2 of these categories: M Wasting, Fat Loss, Fluid, Secondary Signs, Wt. Status, Intake)    Patient meets criteria for  Severe Malnutrition                    Electronically signed by:  Suzanna Roca RD  07/02/24 17:48 EDT

## 2024-07-02 NOTE — THERAPY EVALUATION
Patient Name: Mariia Roberson  : 1940    MRN: [id number]                              Today's Date: 2024       Admit Date: 2024    Visit Dx:     ICD-10-CM ICD-9-CM   1. Compression fracture of T9 vertebra, initial encounter  S22.070A 805.2   2. Compression fracture of T11 vertebra, initial encounter  S22.080A 805.2   3. Severe back pain  M54.9 724.5   4. Referred by health care professional  Z02.89 V68.89   5. Closed fracture of multiple thoracic vertebrae, initial encounter  S22.009A 805.2   6. Osteoporosis with pathological fracture of thoracic vertebra  M80.08XA 733.00     733.13     Patient Active Problem List   Diagnosis    Mult fractures of thoracic spine, closed    Intractable pain    HTN (hypertension)    Multiple fractures of thoracic spine    Osteoporosis with pathological fracture of thoracic vertebra     Past Medical History:   Diagnosis Date    AAA (abdominal aortic aneurysm)     Arthritis     CHF (congestive heart failure)     Glaucoma     Hypertension     Stroke      Past Surgical History:   Procedure Laterality Date    INTERVENTIONAL RADIOLOGY PROCEDURE N/A 2024    Procedure: IR vertebroplasty thoracic with fluoroscopy, T9 and T10;  Surgeon: Javier Perez MD;  Location: [facility] INVASIVE LOCATION;  Service: Interventional Radiology;  Laterality: N/A;    JOINT REPLACEMENT        General Information       Row Name 24 1016          OT Time and Intention    Document Type evaluation  -AN     Mode of Treatment occupational therapy  -AN       Row Name 24 1016          General Information    Patient Profile Reviewed yes  -AN     Prior Level of Function independent:;all household mobility;transfer;min assist:;ADL's;bathing  Daugher assists with showering  -AN     Existing Precautions/Restrictions fall;spinal;other (see comments)  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  -AN     Barriers to Rehab medically complex;previous functional  deficit;cognitive status;visual deficit  -AN       Row Name 07/02/24 1016          Living Environment    People in Home child(lionel), adult;other (see comments)  daugher can assist 24/7  -AN       Row Name 07/02/24 1016          Home Main Entrance    Number of Stairs, Main Entrance none  -AN       [facility] Name 07/02/24 1016          Stairs Within Home, Primary    Number of Stairs, Within Home, Primary none  -AN     Stairs Comment, Within Home, Primary walk in shower with shower chair  -AN       Row Name 07/02/24 1016          Cognition    Orientation Status (Cognition) oriented x 3  -AN       Row Name 07/02/24 1016          Safety Issues, Functional Mobility    Safety Issues Affecting Function (Mobility) safety precaution awareness;safety precautions follow-through/compliance;insight into deficits/self-awareness;sequencing abilities;awareness of need for assistance;judgment;problem-solving  -AN     Impairments Affecting Function (Mobility) balance;cognition;endurance/activity tolerance;pain;strength  -AN     Cognitive Impairments, Mobility Safety/Performance awareness, need for assistance;safety precaution follow-through;sequencing abilities;insight into deficits/self-awareness;judgment;problem-solving/reasoning;safety precaution awareness  -AN               User Key  (r) = Recorded By, (t) = Taken By, (c) = Cosigned By      Initials Name Provider Type    AN Citlali Arguello OT Occupational Therapist                     Mobility/ADL's       Row Name 07/02/24 1019          Bed Mobility    Comment, (Bed Mobility) [facility]IC upon arrival  -AN       [facility] Name 07/02/24 1019          Transfers    Transfers sit-stand transfer;stand-sit transfer  -AN     Comment, (Transfers) Cues for hand placement and transfer technique using the RW  -AN       Row Name 07/02/24 1019          Sit-Stand Transfer    Sit-Stand [place] (Transfers) verbal cues;contact guard  -AN     Assistive Device (Sit-Stand Transfers) walker, front-wheeled  -AN        Row Name 07/02/24 1019          Stand-Sit Transfer    Stand-Sit [place] (Transfers) verbal cues;contact guard  -AN     Assistive Device (Stand-Sit Transfers) walker, front-wheeled  -AN       [facility] Name 07/02/24 1019          Functional Mobility    Functional Mobility- Ind. Level minimum assist (75% patient effort);1 person;verbal cues required  -AN     Functional Mobility- Device walker, front-wheeled  -AN     Functional Mobility-Distance (Feet) --  <household distance  -AN     Functional Mobility- Comment Pt ambulated short distance using the RW with Min A provided at gait belt for balance. Further activity limited by pain and fatigue..  -AN     Patient was able to Ambulate yes  -AN       [facility] Name 07/02/24 1019          Activities of Daily Living    BADL Assessment/Intervention upper body dressing;lower body dressing  -AN       Row Name 07/02/24 1019          Upper Body Dressing Assessment/Training    [place] Level (Upper Body Dressing) don;pajama/robe;minimum assist (75% patient effort)  -AN     Position (Upper Body Dressing) unsupported sitting  -AN       Row Name 07/02/24 1019          Lower Body Dressing Assessment/Training    [place] Level (Lower Body Dressing) don;socks;dependent (less than 25% patient effort)  -AN     Position (Lower Body Dressing) unsupported sitting  -AN               User Key  (r) = Recorded By, (t) = Taken By, (c) = Cosigned By      Initials Name Provider Type    Citlali Montez OT Occupational Therapist                   Obj/Interventions       [facility] Name 07/02/24 1021          Sensory Assessment (Somatosensory)    Sensory Assessment (Somatosensory) UE sensation intact  -AN       Row Name 07/02/24 1021          Vision Assessment/Intervention    Vision Assessment Comment L blind eye  -AN       Row Name 07/02/24 1021          Range of Motion Comprehensive    General Range of Motion bilateral upper extremity ROM WFL  -AN       [facility] Name 07/02/24 1021          Strength  Comprehensive (MMT)    General Manual Muscle Testing (MMT) Assessment upper extremity strength deficits identified  -AN     Comment, General Manual Muscle Testing (MMT) Assessment BUE grossly 3+/5  -AN       Row Name 07/02/24 1021          Motor Skills    Motor Skills functional endurance  -AN     Functional Endurance decreased functional endurance  -AN       Row Name 07/02/24 1021          Balance    Balance Assessment sitting static balance;sitting dynamic balance;sit to stand dynamic balance;standing static balance;standing dynamic balance  -AN     Static Sitting Balance supervision  -AN     Dynamic Sitting Balance supervision  -AN     Position, Sitting Balance sitting in chair  -AN     Sit to Stand Dynamic Balance verbal cues;contact guard  -AN     Static Standing Balance verbal cues;contact guard  -AN     Dynamic Standing Balance verbal cues;minimal assist;1-person assist  -AN     Position/Device Used, Standing Balance supported;walker, front-wheeled  -AN     Balance Interventions standing;sit to stand;supported;static;dynamic;minimal challenge;occupation based/functional task  -AN               User Key  (r) = Recorded By, (t) = Taken By, (c) = Cosigned By      Initials Name Provider Type    AN Citlali Arguello OT Occupational Therapist                   Goals/Plan       Row Name 07/02/24 1025          Bed Mobility Goal 1 (OT)    Activity/Assistive Device (Bed Mobility Goal 1, OT) sit to supine/supine to sit  -AN     [place] Level/Cues Needed (Bed Mobility Goal 1, OT) contact guard required  -AN     Time Frame (Bed Mobility Goal 1, OT) short term goal (STG);5 days  -AN       Row Name 07/02/24 1025          Transfer Goal 1 (OT)    Activity/Assistive Device (Transfer Goal 1, OT) sit-to-stand/stand-to-sit;toilet;walker, rolling  -AN     [place] Level/Cues Needed (Transfer Goal 1, OT) contact guard required  -AN     Time Frame (Transfer Goal 1, OT) long term goal (LTG);2 weeks  -AN       Row Name  07/02/24 1025          Dressing Goal 1 (OT)    Activity/Device (Dressing Goal 1, OT) lower body dressing;reacher;sock-aid;long-handled shoe horn  -AN     [place]/Cues Needed (Dressing Goal 1, OT) contact guard required  -AN     Time Frame (Dressing Goal 1, OT) long term goal (LTG);2 weeks  -AN       Row Name 07/02/24 1025          Toileting Goal 1 (OT)    Activity/Device (Toileting Goal 1, OT) adjust/manage clothing;perform perineal hygiene;commode  -AN     [place] Level/Cues Needed (Toileting Goal 1, OT) contact guard required  -AN     Time Frame (Toileting Goal 1, OT) long term goal (LTG);2 weeks  -AN       Row Name 07/02/24 1025          Therapy Assessment/Plan (OT)    Planned Therapy Interventions (OT) activity tolerance training;adaptive equipment training;BADL retraining;cognitive/visual perception retraining;functional balance retraining;occupation/activity based interventions;patient/caregiver education/training;transfer/mobility retraining;strengthening exercise  -AN               User Key  (r) = Recorded By, (t) = Taken By, (c) = Cosigned By      Initials Name Provider Type    Citlali Montez OT Occupational Therapist                   Clinical Impression       Row Name 07/02/24 1023          Pain Assessment    Pretreatment Pain Rating 5/10  -AN     Posttreatment Pain Rating 5/10  -AN     Pain Location generalized  -AN     Pain Location - back  -AN     Pre/Posttreatment Pain Comment tolerated  -AN     Pain Intervention(s) Repositioned;Ambulation/increased activity  -AN       Row Name 07/02/24 1023          Plan of Care Review    Plan of Care Reviewed With patient  -AN     Progress no change  -AN     Outcome Evaluation Pt presents below her functional baseline with deficits including generalized weakness, impaired balance, decreased activity tolerance and impaired ADLs warranting skilled OT services. Pt performed STS with CGA and ambulated short distance with MIn A using the RW. Rec IPR at  dc for best functional outcomes.  -AN       Row Name 07/02/24 1023          Therapy Assessment/Plan (OT)    Patient/Family Therapy Goal Statement (OT) Return to PLOF  -AN     Rehab Potential (OT) good, to achieve stated therapy goals  -AN     Criteria for Skilled Therapeutic Interventions Met (OT) yes;skilled treatment is necessary  -AN     Therapy Frequency (OT) daily  -AN       Row Name 07/02/24 1023          Therapy Plan Review/Discharge Plan (OT)    Anticipated Discharge Disposition (OT) inpatient rehabilitation facility  -AN       Row Name 07/02/24 1023          Vital Signs    O2 Delivery Pre Treatment room air  -AN     O2 Delivery Intra Treatment room air  -AN     O2 Delivery Post Treatment room air  -AN     Pre Patient Position Sitting  -AN     Intra Patient Position Standing  -AN     Post Patient Position Sitting  -AN       Row Name 07/02/24 1023          Positioning and Restraints    Pre-Treatment Position sitting in chair/recliner  -AN     Post Treatment Position chair  -AN     In Chair notified nsg;reclined;call light within reach;encouraged to call for assist;exit alarm on;legs elevated;waffle cushion  -AN               User Key  (r) = Recorded By, (t) = Taken By, (c) = Cosigned By      Initials Name Provider Type    AN Citlali Arguello, STEPHANIE Occupational Therapist                   Outcome Measures       Row Name 07/02/24 1026          How much help from another is currently needed...    Putting on and taking off regular lower body clothing? 1  -AN     Bathing (including washing, rinsing, and drying) 2  -AN     Toileting (which includes using toilet bed pan or urinal) 2  -AN     Putting on and taking off regular upper body clothing 3  -AN     Taking care of personal grooming (such as brushing teeth) 3  -AN     Eating meals 3  -AN     AM-PAC 6 Clicks Score (OT) 14  -AN       Row Name 07/02/24 0800          How much help from another person do you currently need...    Turning from your back to your side  while in flat bed without using bedrails? 2 (P)   -EG     Moving from lying on back to sitting on the side of a flat bed without bedrails? 2 (P)   -EG     Moving to and from a bed to a chair (including a wheelchair)? 2 (P)   -EG     Standing up from a chair using your arms (e.g., wheelchair, bedside chair)? 2 (P)   -EG     Climbing 3-5 steps with a railing? 2 (P)   -EG     To walk in hospital room? 2 (P)   -EG     AM-PAC 6 Clicks Score (PT) 12 (P)   -EG     Highest Level of Mobility Goal 4 --> Transfer to chair/commode (P)   -EG       Row Name 07/02/24 1026          Functional Assessment    Outcome Measure Options AM-PAC 6 Clicks Daily Activity (OT)  -AN               User Key  (r) = Recorded By, (t) = Taken By, (c) = Cosigned By      Initials Name Provider Type    Citlali Montez OT Occupational Therapist    Erika Zamora MA Medical Assistant                    Occupational Therapy Education       Title: PT OT SLP Therapies (In Progress)       Topic: Occupational Therapy (In Progress)       Point: ADL training (Done)       Description:   Instruct learner(s) on proper safety adaptation and remediation techniques during self care or transfers.   Instruct in proper use of assistive devices.                  Learning Progress Summary             Patient Acceptance, E, VU by AN at 7/2/2024 1026                         Point: Home exercise program (Not Started)       Description:   Instruct learner(s) on appropriate technique for monitoring, assisting and/or progressing therapeutic exercises/activities.                  Learner Progress:  Not documented in this visit.              Point: Precautions (Done)       Description:   Instruct learner(s) on prescribed precautions during self-care and functional transfers.                  Learning Progress Summary             Patient Acceptance, E, VU by AN at 7/2/2024 1026                         Point: Body mechanics (Done)       Description:   Instruct  learner(s) on proper positioning and spine alignment during self-care, functional mobility activities and/or exercises.                  Learning Progress Summary             Patient Acceptance, E, VU by AN at 7/2/2024 1026                                         User Key       Initials Effective Dates Name Provider Type Discipline    AN 09/21/21 -  Citlali Arguello OT Occupational Therapist OT                  OT Recommendation and Plan  Planned Therapy Interventions (OT): activity tolerance training, adaptive equipment training, BADL retraining, cognitive/visual perception retraining, functional balance retraining, occupation/activity based interventions, patient/caregiver education/training, transfer/mobility retraining, strengthening exercise  Therapy Frequency (OT): daily  Plan of Care Review  Plan of Care Reviewed With: patient  Progress: no change  Outcome Evaluation: Pt presents below her functional baseline with deficits including generalized weakness, impaired balance, decreased activity tolerance and impaired ADLs warranting skilled OT services. Pt performed STS with CGA and ambulated short distance with MIn A using the RW. Rec IPR at dc for best functional outcomes.     Time Calculation:   Evaluation Complexity (OT)  Review Occupational Profile/Medical/Therapy History Complexity: expanded/moderate complexity  Assessment, Occupational Performance/Identification of Deficit Complexity: 3-5 performance deficits  Clinical Decision Making Complexity (OT): detailed assessment/moderate complexity  Overall Complexity of Evaluation (OT): moderate complexity     Time Calculation- OT       Row Name 07/02/24 1026             Time Calculation- OT    OT Start Time 0830  -AN      OT Received On 07/02/24  -AN      OT Goal Re-Cert Due Date 07/12/24  -AN         Untimed Charges    OT Eval/Re-eval Minutes 46  -AN         Total Minutes    Untimed Charges Total Minutes 46  -AN       Total Minutes 46  -AN                User  Key  (r) = Recorded By, (t) = Taken By, (c) = Cosigned By      Initials Name Provider Type    Citlali Montez OT Occupational Therapist                  Therapy Charges for Today       Code Description Service Date Service Provider Modifiers Qty    [id number] HC OT EVAL MOD COMPLEXITY 4 7/2/2024 Citlali Arguello OT GO 1                 Citlali Arguello OT  7/2/2024

## 2024-07-02 NOTE — PROGRESS NOTES
"NAME: LORRIE RHODES  : 1940  PCP: Susan Waters APRN  ADMITTING PHYSICIAN: Adrien العلي DO    DATE OF ADMISSION:  2024  DATE OF SERVICE: 2024    HOSPITAL DAY:  3 days    CC:   Chief Complaint   Patient presents with    Back Pain       HISTORY OF PRESENT ILLNESS:  84 y.o. female with T9 and T11 compression fractures, s/p vertebroplasty on 2024.  She reports improvement in her pain and that she was able to work with PT/OT to walk a few steps.    REVIEW OF IMAGING:  No new imaging    LABS:  Lab Results   Component Value Date    WBC 12.05 (H) 2024    HGB 12.2 2024    HCT 38.7 2024    MCV 91.9 2024     2024     Lab Results   Component Value Date    GLUCOSE 98 2024    CALCIUM 9.0 2024     2024    K 3.7 2024    CO2 25.0 2024     2024    BUN 19 2024    CREATININE 1.10 (H) 2024    EGFRIFAFRI 55 2024    BCR 17.3 2024    ANIONGAP 13.0 2024     No results found for: \"HGBA1C\"  No results found for: \"CHOL\", \"CHLPL\", \"TRIG\", \"HDL\", \"LDL\", \"LDLDIRECT\"    CURRENT MEDS:  Current Facility-Administered Medications   Medication Dose Route Frequency Provider Last Rate Last Admin    atorvastatin (LIPITOR) tablet 10 mg  10 mg Oral Nightly Michelle Dowell PA-C   10 mg at 24    sennosides-docusate (PERICOLACE) 8.6-50 MG per tablet 2 tablet  2 tablet Oral BID Michelle Dowell PA-C   2 tablet at 24 0802    And    polyethylene glycol (MIRALAX) packet 17 g  17 g Oral Daily PRN Michelle Dowell PA-C        And    bisacodyl (DULCOLAX) EC tablet 5 mg  5 mg Oral Daily PRN Michelle Dowell PA-C        And    bisacodyl (DULCOLAX) suppository 10 mg  10 mg Rectal Daily PRN Michelle Dowell PA-C        busPIRone (BUSPAR) tablet 5 mg  5 mg Oral BID Michelle Dowell PA-C   5 mg at 24 0802    Calcium Replacement - Follow Nurse / BPA Driven Protocol   Does not apply PRN Michelle Dowell, " ELENA        dorzolamide-timolol (COSOPT) ophthalmic solution 1 drop  1 drop Right Eye BID Michelle Dowell PA-C   1 drop at 07/02/24 0939    ferrous sulfate tablet 325 mg  325 mg Oral Daily With Breakfast Michelle Dowell PA-C   325 mg at 07/02/24 0802    furosemide (LASIX) tablet 20 mg  20 mg Oral Daily Michelle Dowell PA-C   20 mg at 07/02/24 0802    hydrALAZINE (APRESOLINE) injection 10 mg  10 mg Intravenous Q6H PRN Michelle Dowell PA-C   10 mg at 06/29/24 1302    latanoprost (XALATAN) 0.005 % ophthalmic solution 1 drop  1 drop Right Eye Nightly Michelle Dowell PA-C   1 drop at 07/01/24 2033    Lidocaine 4 % 1 patch  1 patch Transdermal Q24H Michelle Dowell PA-C   1 patch at 07/02/24 0802    [Transfer Hold] LORazepam (ATIVAN) injection 0.5 mg  0.5 mg Intravenous Q4H PRN Jules Dorsey MD   0.5 mg at 06/30/24 1537    losartan (COZAAR) tablet 25 mg  25 mg Oral Q24H Michelle Dowell PA-C   25 mg at 07/02/24 0802    Magnesium Standard Dose Replacement - Follow Nurse / BPA Driven Protocol   Does not apply PRN Michelle Dowell PA-C        morphine injection 1 mg  1 mg Intravenous Q4H PRN Michelle Dowell PA-C   1 mg at 07/02/24 1245    And    naloxone (NARCAN) injection 0.4 mg  0.4 mg Intravenous Q5 Min PRN Michelle Dowell PA-C        ondansetron ODT (ZOFRAN-ODT) disintegrating tablet 4 mg  4 mg Oral Q6H PRN Michelle Dowell PA-C   4 mg at 07/02/24 0002    Or    ondansetron (ZOFRAN) injection 4 mg  4 mg Intravenous Q6H PRN Michelle Dowell PA-C        oxyCODONE (ROXICODONE) immediate release tablet 5 mg  5 mg Oral Q6H PRN Michelle Dowell PA-C   5 mg at 07/02/24 0810    pantoprazole (PROTONIX) EC tablet 40 mg  40 mg Oral Daily Michelle Dowell PA-C   40 mg at 07/02/24 0802    Phosphorus Replacement - Follow Nurse / BPA Driven Protocol   Does not apply PRN Michelle Dowell PA-C        Potassium Replacement - Follow Nurse / BPA Driven Protocol   Does not apply PRN Michelle Dowell PA-C        sodium chloride  0.9 % bolus 500 mL  500 mL Intravenous Once DelmerJoshuaMichelle NNEKA, PA-C 500 mL/hr at 06/29/24 1503 Rate Change at 06/29/24 1503    sodium chloride 0.9 % flush 10 mL  10 mL Intravenous PRN [place], Michelle N, PA-C        sodium chloride 0.9 % flush 10 mL  10 mL Intravenous Q12H [place], Michelle N, PA-C   10 mL at 07/02/24 0803    sodium chloride 0.9 % flush 10 mL  10 mL Intravenous PRN [place], Michelle N, PA-C   10 mL at 07/02/24 0002    sodium chloride 0.9 % infusion 40 mL  40 mL Intravenous PRN Michelle Dowell PA-C        traZODone (DESYREL) tablet 50 mg  50 mg Oral Nightly Michelle Dowell PA-C   50 mg at 07/01/24 2031       PHYSICAL EXAM:  Vitals:    07/02/24 1200   BP: 125/83   Pulse: 99   Resp: 16   Temp: 97.8 °F (36.6 °C)   SpO2: 96%     General: Elderly female, no acute distress, sitting comfortably in chair    Neurological examination: AAOx3.  Nonfocal neurological exam.      ASSESSMENT/PLAN:  Ms. Roberson is a 84 y.o. female with acute T9 and T11 compression fractures, s/p vertebroplasty.  She does report having improvement in her back pain.  She should continue to work on mobility with PT/OT. She can follow-up in the neurointervention clinic in 2 to 3 weeks.

## 2024-07-02 NOTE — PLAN OF CARE
Goal Outcome Evaluation:  Plan of Care Reviewed With: patient        Progress: no change  Outcome Evaluation: Pt presents below her functional baseline with deficits including generalized weakness, impaired balance, decreased activity tolerance and impaired ADLs warranting skilled OT services. Pt performed STS with CGA and ambulated short distance with MIn A using the RW. Rec IPR at dc for best functional outcomes.      Anticipated Discharge Disposition (OT): inpatient rehabilitation facility

## 2024-07-02 NOTE — PROGRESS NOTES
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    PSH:  She  has a past surgical history that includes Joint replacement and Interventional radiology procedure (N/A, 7/1/2024).    Substance history: Etoh, tobacco    Applicable Nutrition History:   s/p surgery 7-1-24:    IR vertebroplasty thoracic with fluoroscopy, T9 and T11     Anthropometrics     Height: Height: 152.4 cm (60\")  Last Filed Weight: Weight: 44 kg (97 lb) (06/28/24 1000)  Method: Weight Method: Stated  BMI: BMI (Calculated): 18.9    UBW:  97.4# stated from home scale  7/31/23: 88lb [facility]    Weight change: ?9lb wt gain over past year, limited wt records, pt appears cachectic    Need accurate bedscale weight this admit    Nutrition Focused Physical Exam    Date: 7-2    Patient meets criteria for malnutrition diagnosis, see MSA note.     Subjective   Reported/Observed/Food/Nutrition Related History:     7-2: pt sitting up in chair, cachectic appearing, [place], is c/o back pain, in unsure of her UBW, states she has never been overweight    Per RN: pt able to swallow ok, poor appetite    6-29:Pt up in bed with multiple family members present at time of visit. Endorsed a good appetite/intake prior to injury on 6/19/24, however since injury " has had decreasing intake ? 2/2 pain and/or medication. Reported feelings of hunger at this time. Declined ONS 2/2 distaste. NKFA    Current Nutrition Prescription   PO: Diet: Regular/House; Fluid Consistency: Thin (IDDSI 0)  Oral Nutrition Supplement: N/A  Intake:  54% of 7 meals    Assessment & Plan   Nutrition Diagnosis   Date:  6/29            Updated:  7-2  Problem Malnutrition     Etiology Per Clinical Status: chronic disease   Signs/Symptoms Severe muscle wasting, fat loss     Goal:   Nutrition to support treatment and Increase intake    Nutrition Intervention      Follow treatment progress, Care plan reviewed, Advised available snacks, Interview for preferences, Menu provided, Menu adjusted, Encourage intake, Supplement provided    Pt does not like oral supplements, will add 2pm snack, HS snack    Encourage frequent small meals and snacks    Pt meets criteria for severe chronic malnutrition 2nd chronic disease, evidenced by severe muscle wasting, fat loss      Monitoring/Evaluation:   Per protocol, I&O, PO intake, Pertinent labs, Weight    Suzanna Roca, RD  Time Spent: 60min

## 2024-07-02 NOTE — THERAPY EVALUATION
Patient Name: Mariia Roberson  : 1940    MRN: [id number]                              Today's Date: 2024       Admit Date: 2024    Visit Dx:     ICD-10-CM ICD-9-CM   1. Compression fracture of T9 vertebra, initial encounter  S22.070A 805.2   2. Compression fracture of T11 vertebra, initial encounter  S22.080A 805.2   3. Severe back pain  M54.9 724.5   4. Referred by health care professional  Z02.89 V68.89   5. Closed fracture of multiple thoracic vertebrae, initial encounter  S22.009A 805.2   6. Osteoporosis with pathological fracture of thoracic vertebra  M80.08XA 733.00     733.13     Patient Active Problem List   Diagnosis    Mult fractures of thoracic spine, closed    Intractable pain    HTN (hypertension)    Multiple fractures of thoracic spine    Osteoporosis with pathological fracture of thoracic vertebra     Past Medical History:   Diagnosis Date    AAA (abdominal aortic aneurysm)     Arthritis     CHF (congestive heart failure)     Glaucoma     Hypertension     Stroke      Past Surgical History:   Procedure Laterality Date    INTERVENTIONAL RADIOLOGY PROCEDURE N/A 2024    Procedure: IR vertebroplasty thoracic with fluoroscopy, T9 and T10;  Surgeon: Javier Perez MD;  Location: [facility] INVASIVE LOC[facility];  Service: Interventional Radiology;  Laterality: N/A;    JOINT REPLACEMENT        General Information       Row Name 24 0944          Physical Therapy Time and Intention    Document Type evaluation  -MB     Mode of Treatment physical therapy  -MB       Row Name 24 0944          General Information    Patient Profile Reviewed yes  -MB     Prior Level of Function independent:;bed mobility;ADL's;transfer;gait;all household mobility  -MB     Existing Precautions/Restrictions fall;spinal;other (see comments)  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  -MB     Barriers to Rehab medically complex;previous functional deficit;cognitive status;visual deficit   -[facility] Name 07/02/24 0944          Living Environment    People in Home child(lionel), adult  daugher can assist 24/7  -[facility] Name 07/02/24 0944          Home Main Entrance    Number of Stairs, Main Entrance none  -[facility] Name 07/02/24 0944          Stairs Within Home, Primary    Number of Stairs, Within Home, Primary none  -[facility] Name 07/02/24 0944          Cognition    Orientation Status (Cognition) oriented x 3  -[facility] Name 07/02/24 0944          Safety Issues, Functional Mobility    Safety Issues Affecting Function (Mobility) awareness of need for assistance;insight into deficits/self-awareness;judgment;positioning of assistive device;safety precaution awareness;safety precautions follow-through/compliance;problem-solving;sequencing abilities  -MB     Impairments Affecting Function (Mobility) balance;cognition;endurance/activity tolerance;pain;strength  -MB     Cognitive Impairments, Mobility Safety/Performance awareness, need for assistance;insight into deficits/self-awareness;judgment;problem-solving/reasoning;safety precaution awareness;safety precaution follow-through;sequencing abilities  -MB               User Key  (r) = Recorded By, (t) = Taken By, (c) = Cosigned By      Initials Name Provider Type    MB Laurie Begum, PT Physical Therapist                   Mobility       Row Name 07/02/24 1501          Bed Mobility    Comment, (Bed Mobility) Pt. received and left sitting UIC.  -[facility] Name 07/02/24 1501          Transfers    Comment, (Transfers) VCs for safe hand placement and upright posture in standing.  -[facility] Name 07/02/24 1501          Sit-Stand Transfer    Sit-Stand [place] (Transfers) contact guard;verbal cues  -MB     Assistive Device (Sit-Stand Transfers) walker, front-wheeled  -MB       Row Name 07/02/24 1501          Gait/Stairs (Locomotion)    [place] Level (Gait) minimum assist (75% patient effort);verbal cues  -MB     Assistive  Device (Gait) walker, front-wheeled  -MB     Distance in Feet (Gait) 25  -MB     Deviations/Abnormal Patterns (Gait) base of support, narrow;nya decreased;gait speed decreased;stride length decreased  -MB     Bilateral Gait Deviations forward flexed posture;heel strike decreased  -MB     [place] Level (Stairs) not tested  -MB     Comment, (Gait/Stairs) Pt. ambulated w/ step through gait pattern w/ slow nya, increased forward flexion, and dec B stride length. She required consistent cueing for forward gaze, upright posture, and assist to manage RW.  -MB               User Key  (r) = Recorded By, (t) = Taken By, (c) = Cosigned By      Initials Name Provider Type    Laurie Nuñez, PT Physical Therapist                   Obj/Interventions       Row Name 07/02/24 1504          Range of Motion Comprehensive    General Range of Motion bilateral lower extremity ROM WFL  -MB       Row Name 07/02/24 1504          Strength Comprehensive (MMT)    General Manual Muscle Testing (MMT) Assessment lower extremity strength deficits identified  -MB     Comment, General Manual Muscle Testing (MMT) Assessment BLEs grossly 4-/5  -MB       Row Name 07/02/24 1504          Balance    Balance Assessment sitting static balance;standing static balance;standing dynamic balance  -MB     Static Standing Balance contact guard;verbal cues  -MB     Dynamic Standing Balance minimal assist;verbal cues  -MB     Position/Device Used, Standing Balance supported;walker, front-wheeled  -MB     Balance Interventions standing;sit to stand;occupation based/functional task;dynamic reaching;weight shifting activity  -MB       Row Name 07/02/24 1504          Sensory Assessment (Somatosensory)    Sensory Assessment (Somatosensory) LE sensation intact  -MB               User Key  (r) = Recorded By, (t) = Taken By, (c) = Cosigned By      Initials Name Provider Type    Laurie Nuñez, PT Physical Therapist                   Goals/Plan        Row Name 07/02/24 1510          Bed Mobility Goal 1 (PT)    Activity/Assistive Device (Bed Mobility Goal 1, PT) sidelying to sit/sit to sidelying;rolling to left;rolling to right  -MB     [place] Level/Cues Needed (Bed Mobility Goal 1, PT) minimum assist (75% or more patient effort)  -MB     Time Frame (Bed Mobility Goal 1, PT) 1 week;short term goal (STG)  -MB       Row Name 07/02/24 1510          Transfer Goal 1 (PT)    Activity/Assistive Device (Transfer Goal 1, PT) sit-to-stand/stand-to-sit;bed-to-chair/chair-to-bed;walker, rolling  -MB     [place] Level/Cues Needed (Transfer Goal 1, PT) standby assist  -MB     Time Frame (Transfer Goal 1, PT) 10 days  -MB       Row Name 07/02/24 1510          Gait Training Goal 1 (PT)    Activity/Assistive Device (Gait Training Goal 1, PT) gait (walking locomotion);walker, rolling  -MB     [place] Level (Gait Training Goal 1, PT) contact guard required  -MB     Distance (Gait Training Goal 1, PT) 100  -MB     Time Frame (Gait Training Goal 1, PT) 10 days  -MB       Row Name 07/02/24 1510          Patient Education Goal (PT)    Activity (Patient Education Goal, PT) HEP and spinal precautions  -MB     [place]/Cues/Accuracy (Memory Goal 2, PT) verbalizes understanding  -MB     Time Frame (Patient Education Goal, PT) 10 days  -MB       Row Name 07/02/24 1510          Therapy Assessment/Plan (PT)    Planned Therapy Interventions (PT) balance training;bed mobility training;gait training;home exercise program;patient/family education;postural re-education;strengthening;transfer training  -MB               User Key  (r) = Recorded By, (t) = Taken By, (c) = Cosigned By      Initials Name Provider Type    Laurie Nuñez, PT Physical Therapist                   Clinical Impression       Row Name 07/02/24 1505          Pain    Pretreatment Pain Rating 5/10  -MB     Posttreatment Pain Rating 5/10  -MB     Pain Location upper  -MB     Pain Location - back   -MB     Pain Intervention(s) Ambulation/increased activity;Repositioned  -MB       Row Name 07/02/24 1505          Plan of Care Review    Plan of Care Reviewed With patient  -MB     Progress no change  -MB     Outcome Evaluation PT eval completed. Patient presents w/ generalized weakness, balance deficits, gait instability, decreased activity tolerance, and is below her baseline. She completed sit to stand transfers w/ CGA and ambulated 25ft w/ RW and min A with minimal c/o pain. Pt. would benefit from acute PT to improve safety and independence w/ functional mobility. Recommend IP rehab at D/C.  -MB       Row Name 07/02/24 1505          Therapy Assessment/Plan (PT)    Rehab Potential (PT) good, to achieve stated therapy goals  -MB     Criteria for Skilled Interventions Met (PT) yes;meets criteria;skilled treatment is necessary  -MB     Therapy Frequency (PT) daily  -MB       Row Name 07/02/24 1505          Positioning and Restraints    Pre-Treatment Position sitting in chair/recliner  -MB     Post Treatment Position chair  -MB     In Chair notified nsg;reclined;call light within reach;encouraged to call for assist;exit alarm on;with brace;waffle cushion;legs elevated;heels elevated  -MB               User Key  (r) = Recorded By, (t) = Taken By, (c) = Cosigned By      Initials Name Provider Type    Laurie Nuñez, PT Physical Therapist                   Outcome Measures       Row Name 07/02/24 1511 07/02/24 0800       How much help from another person do you currently need...    Turning from your back to your side while in flat bed without using bedrails? 2  -MB 2  -AC (r) EG (t) AC (c)    Moving from lying on back to sitting on the side of a flat bed without bedrails? 2  -MB 2  -AC (r) EG (t) AC (c)    Moving to and from a bed to a chair (including a wheelchair)? 3  -MB 2  -AC (r) EG (t) AC (c)    Standing up from a chair using your arms (e.g., wheelchair, bedside chair)? 3  -MB 2  -AC (r) EG (t) AC (c)     Climbing 3-5 steps with a railing? 2  -MB 2  -AC (r) EG (t) AC (c)    To walk in hospital room? 3  -MB 2  -AC (r) EG (t) AC (c)    AM-PAC 6 Clicks Score (PT) 15  -MB 12  -AC (r) EG (t)    Highest Level of Mobility Goal 4 --> Transfer to chair/commode  -MB 4 --> Transfer to chair/commode  -AC (r) EG (t)      Row Name 07/02/24 1026          Functional Assessment    Outcome Measure Options AM-PAC 6 Clicks Daily Activity (OT)  -AN               User Key  (r) = Recorded By, (t) = Taken By, (c) = Cosigned By      Initials Name Provider Type    Laurie Nuñez, PT Physical Therapist    Gaby Bautista RN Registered Nurse    Citlali Montez, OT Occupational Therapist    Erika Zamora MA Medical Assistant                                 Physical Therapy Education       Title: PT OT SLP Therapies (In Progress)       Topic: Physical Therapy (In Progress)       Point: Mobility training (In Progress)       Learning Progress Summary             Patient Acceptance, E,D, NR by MB at 7/2/2024 1511                         Point: Home exercise program (In Progress)       Learning Progress Summary             Patient Acceptance, E,D, NR by MB at 7/2/2024 1511                         Point: Body mechanics (In Progress)       Learning Progress Summary             Patient Acceptance, E,D, NR by MB at 7/2/2024 1511                         Point: Precautions (In Progress)       Learning Progress Summary             Patient Acceptance, E,D, NR by MB at 7/2/2024 1511                                         User Key       Initials Effective Dates Name Provider Type [facility] 06/16/21 -  Laurie Begum, PT Physical Therapist PT                  PT Recommendation and Plan  Planned Therapy Interventions (PT): balance training, bed mobility training, gait training, home exercise program, patient/family education, postural re-education, strengthening, transfer training  Plan of Care Reviewed With: patient  Progress:  no change  Outcome Evaluation: PT eval completed. Patient presents w/ generalized weakness, balance deficits, gait instability, decreased activity tolerance, and is below her baseline. She completed sit to stand transfers w/ CGA and ambulated 25ft w/ RW and min A with minimal c/o pain. Pt. would benefit from acute PT to improve safety and independence w/ functional mobility. Recommend IP rehab at D/C.     Time Calculation:   PT Evaluation Complexity  History, PT Evaluation Complexity: 1-2 personal factors and/or comorbidities  Examination of Body Systems (PT Eval Complexity): total of 3 or more elements  Clinical Presentation (PT Evaluation Complexity): evolving  Clinical Decision Making (PT Evaluation Complexity): moderate complexity  Overall Complexity (PT Evaluation Complexity): moderate complexity     PT Charges       Row Name 07/02/24 1512             Time Calculation    Start Time 0944  -MB      PT Received On 07/02/24  -MB      PT Goal Re-Cert Due Date 07/12/24  -MB         Timed Charges    57983 - Gait Training Minutes  10  -MB         Untimed Charges    PT Eval/Re-eval Minutes 46  -MB         Total Minutes    Timed Charges Total Minutes 10  -MB      Untimed Charges Total Minutes 46  -MB       Total Minutes 56  -MB                User Key  (r) = Recorded By, (t) = Taken By, (c) = Cosigned By      Initials Name Provider Type    Laurie Nuñez, PT Physical Therapist                  Therapy Charges for Today       Code Description Service Date Service Provider Modifiers Qty    26233837331 HC GAIT TRAINING EA 15 MIN 7/2/2024 Laurie Begum, PT GP 1    87132349532 HC PT EVAL MOD COMPLEXITY 4 7/2/2024 Laurie Begum, PT GP 1            PT G-Codes  Outcome Measure Options: AM-PAC 6 Clicks Daily Activity (OT)  AM-PAC 6 Clicks Score (PT): 15  AM-PAC 6 Clicks Score (OT): 14  PT Discharge Summary  Anticipated Discharge Disposition (PT): inpatient rehabilitation facility    Laurie Begum  PT  7/2/2024

## 2024-07-02 NOTE — PLAN OF CARE
Goal Outcome Evaluation:  Plan of Care Reviewed With: patient        Progress: no change  Outcome Evaluation: PT eval completed. Patient presents w/ generalized weakness, balance deficits, gait instability, decreased activity tolerance, and is below her baseline. She completed sit to stand transfers w/ CGA and ambulated 25ft w/ RW and min A with minimal c/o pain. Pt. would benefit from acute PT to improve safety and independence w/ functional mobility. Recommend IP rehab at D/C.      Anticipated Discharge Disposition (PT): inpatient rehabilitation facility

## 2024-07-02 NOTE — PROGRESS NOTES
[facility] Medicine Services  PROGRESS NOTE    Patient Name: Mariia Roberson  : 1940  MRN: [id number]    Date of Admission: 2024  Primary Care Physician: Susan Waters APRN    Subjective   Subjective     CC:  Compression fracture, back pain     HPI:  Up in chair, drowsy, recently received pain medications. No pain currently. Harsh cough this morning.       Objective   Objective     Vital Signs:   Temp:  [97.6 °F (36.4 °C)-98.4 °F (36.9 °C)] 97.8 °F (36.6 °C)  Heart Rate:  [] 99  Resp:  [16] 16  BP: (119-167)/() 125/83  Flow (L/min):  [1-2] 2     Physical Exam:  Constitutional: No acute distress, awake, alert  Respiratory: Clear to auscultation bilaterally lower lobes with upper airway rhonchi, respiratory effort normal on 2LNC  Cardiovascular: RRR, no murmurs  Gastrointestinal: Positive bowel sounds, soft, nontender, nondistended  Musculoskeletal: No bilateral ankle edema  Psychiatric: Appropriate affect, cooperative  Neurologic: Oriented x 3, strength symmetric in all extremities, speech clear  Skin: No rashes      Results Reviewed:  LAB RESULTS:      Lab 24  0339 24  0449 24  1805   WBC 12.05* 10.89* 11.24*   HEMOGLOBIN 12.2 11.0* 12.2   HEMATOCRIT 38.7 35.1 38.9   PLATELETS 342 319 316   NEUTROS ABS  --  7.26* 7.52*   IMMATURE GRANS (ABS)  --  0.03 0.02   LYMPHS ABS  --  2.20 2.63   MONOS ABS  --  0.81 0.68   EOS ABS  --  0.49* 0.31   MCV 91.9 94.9 94.9         Lab 24  0339 24  1246 24  0936 24  0449 24  1805   SODIUM 139 141 138 140 141   POTASSIUM 3.7 4.0 3.8 3.5 3.7   CHLORIDE 101 102 103 104 103   CO2 25.0 25.0 25.0 27.0 25.0   ANION GAP 13.0 14.0 10.0 9.0 13.0   BUN 19 18 17 21 14   CREATININE 1.10* 1.22* 1.12* 1.34* 1.18*   EGFR 49.0* 43.3* 48.0* 38.7* 45.1*   GLUCOSE 98 81 126* 92 97   CALCIUM 9.0 8.9 8.6 8.5* 8.8         Lab 24  1805   TOTAL PROTEIN 6.9   ALBUMIN 3.6   GLOBULIN 3.3   ALT (SGPT) 12    AST (SGOT) 18   BILIRUBIN 0.4   ALK PHOS 114                     Brief Urine Lab Results  (Last result in the past 365 days)        Color   Clarity   Blood   Leuk Est   Nitrite   Protein   CREAT   Urine HCG        06/30/24 1505 Yellow   Clear   Negative   Negative   Negative   30 mg/dL (1+)                   Microbiology Results Abnormal       None            Invasive peripheral vascular study    Result Date: 7/1/2024  Clinical indication: 84-year-old female admitted with intractable back pain, found to have acute T9 and T11 compression fractures. Primary diagnosis: 1.  Pathologic fracture of vertebral body secondary to osteoporosis 2.  Osteoporosis : Dr. Javier Perez. Access: Percutaneous transpedicular approach into the T9 and T11 vertebral body Estimated blood loss: Negligible Conscious sedation: Moderate sedation was provided by me for a total of 41 minutes.  Physical vitals were continuously monitored by an independently trained observer.  A total of 2 mg of Versed and 100 ug of fentanyl were administered intravenously. Procedures: 1.  Fluoroscopic guided percutaneous T9 kyphoplasty 2.  Fluoroscopic guided percutaneous T11 kyphoplasty 3.  Conscious sedation Complications: None apparent Technique:  Formal written consent for the procedure was obtained from the patient and family after explaining risks to include bleeding, infection, aberrant cement extrusion, spinal nerve injury, and failure to relieve pain.  The patient was placed prone on the biplane angiography table and the thoracolumbar region was prepped and draped in the usual, sterile fashion.  Local anesthesia with 1% lidocaine infiltration was achieved.  Additionally, intravenous fentanyl and Versed were given for patient comfort throughout the procedure, the details of which are documented in the nursing record.  Utilizing biplane fluoroscopic guidance, a single G21 trocar needle was placed into the T9 vertebral body via a right  "transpedicular approach.  A cavity was then created within the central portions of the T9 vertebral body utilizing a flex drill.  Next, approximately 3 mL of of a sterile methylmethacrylate/barium cement solution was instilled into the T9 vertebral body without difficulty.  The T9 vertebra was a near \"vertebral plana\" deformity, but there was good distribution of cement throughout the T9 vertebra.  Next, 2 G21 trocar needles were placed into the T11 vertebral body via transpedicular approach.  Augmentation of the T11 vertebra was then performed with G21 balloons.  Next, approximately 8 mL of a sterile methylmethacrylate/cement solution was instilled into the T11 vertebra without difficulty.  There was excellent distribution of cement throughout the T11 vertebra.  At the end of the procedure, all needles were removed and sterile dressing was applied to the puncture site.  The patient was transferred back to her floor bed for postprocedural care/recovery. Impression: Technically successful fluoroscopic percutaneous kyphoplasty of T9 and T11 compression fractures.          Current medications:  Scheduled Meds:atorvastatin, 10 mg, Oral, Nightly  busPIRone, 5 mg, Oral, BID  dorzolamide-timolol, 1 drop, Right Eye, BID  ferrous sulfate, 325 mg, Oral, Daily With Breakfast  furosemide, 20 mg, Oral, Daily  latanoprost, 1 drop, Right Eye, Nightly  Lidocaine, 1 patch, Transdermal, Q24H  losartan, 25 mg, Oral, Q24H  pantoprazole, 40 mg, Oral, Daily  senna-docusate sodium, 2 tablet, Oral, BID  sodium chloride, 500 mL, Intravenous, Once  sodium chloride, 10 mL, Intravenous, Q12H  traZODone, 50 mg, Oral, Nightly      Continuous Infusions:   PRN Meds:.  senna-docusate sodium **AND** polyethylene glycol **AND** bisacodyl **AND** bisacodyl    Calcium Replacement - Follow Nurse / BPA Driven Protocol    hydrALAZINE    [Transfer Hold] LORazepam    Magnesium Standard Dose Replacement - Follow Nurse / BPA Driven Protocol    Morphine " **AND** naloxone    ondansetron ODT **OR** ondansetron    oxyCODONE    Phosphorus Replacement - Follow Nurse / BPA Driven Protocol    Potassium Replacement - Follow Nurse / BPA Driven Protocol    [COMPLETED] Insert Peripheral IV **AND** sodium chloride    sodium chloride    sodium chloride    Assessment & Plan   Assessment & Plan     Active Hospital Problems    Diagnosis  POA    **Mult fractures of thoracic spine, closed [S22.009A]  Yes    Multiple fractures of thoracic spine [S22.009A]  Yes    Intractable pain [R52]  Unknown    HTN (hypertension) [I10]  Unknown    Osteoporosis with pathological fracture of thoracic vertebra [M80.08XA]  Unknown      Resolved Hospital Problems   No resolved problems to display.        Brief Hospital Course to date:  Mariia Roberson is a 84 y.o. female with history of hypertension, CVA, CHF, glaucoma, AAA who presents to the ED with intractable back pain found to have multiple fractures of the thoracic spine     Subacute compression fractures at T9 and T11   Intractable back pain  -Neurosurgery consulted -- s/p vertebroplasty 7/1 with Dr. Perez   -Continue pain control  -PT/OT      Hypertension  -BP was previously low, but trended up   - continue home losartan, lasix  -Of note, home lisinopril has been discontinued secondary to cough     Renal insufficiency  -Cr with slight bump 1.18-->1.34. Improved with fluids  - repeat labs pending      Hx of CHF (data deficient)  -seems compensated, lasix resumed -- monitor.      CVA  -on statin    Expected Discharge Location and Transportation: TBD; likely home with home health - lives with daughter   Expected Discharge   Expected Discharge Date: 7/1/2024; Expected Discharge Time:      VTE Prophylaxis:  Mechanical VTE prophylaxis orders are present.         AM-PAC 6 Clicks Score (PT): 12 (07/02/24 0800)    CODE STATUS:   Code Status and Medical Interventions:   Ordered at: 06/28/24 1610     Medical Intervention Limits:    No intubation (DNI)      Level Of Support Discussed With:    Patient    Next of Kin (If No Surrogate)     Code Status (Patient has no pulse and is not breathing):    No CPR (Do Not Attempt to Resuscitate)     Medical Interventions (Patient has pulse or is breathing):    Limited Support     Comments:    daughter       Dai Houston, APRN  07/02/24

## 2024-07-03 ENCOUNTER — APPOINTMENT (OUTPATIENT)
Dept: GENERAL RADIOLOGY | Facility: HOSPITAL | Age: 84
End: 2024-07-03
Payer: MEDICARE

## 2024-07-03 VITALS
SYSTOLIC BLOOD PRESSURE: 131 MMHG | DIASTOLIC BLOOD PRESSURE: 81 MMHG | HEIGHT: 60 IN | HEART RATE: 105 BPM | OXYGEN SATURATION: 91 % | WEIGHT: 97 LBS | TEMPERATURE: 98.9 F | BODY MASS INDEX: 19.04 KG/M2 | RESPIRATION RATE: 16 BRPM

## 2024-07-03 PROBLEM — E43 SEVERE MALNUTRITION: Status: ACTIVE | Noted: 2024-07-03

## 2024-07-03 LAB
B PARAPERT DNA SPEC QL NAA+PROBE: NOT DETECTED
B PERT DNA SPEC QL NAA+PROBE: NOT DETECTED
C PNEUM DNA NPH QL NAA+NON-PROBE: NOT DETECTED
FLUAV SUBTYP SPEC NAA+PROBE: NOT DETECTED
FLUBV RNA ISLT QL NAA+PROBE: NOT DETECTED
HADV DNA SPEC NAA+PROBE: NOT DETECTED
HCOV 229E RNA SPEC QL NAA+PROBE: NOT DETECTED
HCOV HKU1 RNA SPEC QL NAA+PROBE: NOT DETECTED
HCOV NL63 RNA SPEC QL NAA+PROBE: NOT DETECTED
HCOV OC43 RNA SPEC QL NAA+PROBE: NOT DETECTED
HMPV RNA NPH QL NAA+NON-PROBE: NOT DETECTED
HPIV1 RNA ISLT QL NAA+PROBE: NOT DETECTED
HPIV2 RNA SPEC QL NAA+PROBE: NOT DETECTED
HPIV3 RNA NPH QL NAA+PROBE: NOT DETECTED
HPIV4 P GENE NPH QL NAA+PROBE: NOT DETECTED
M PNEUMO IGG SER IA-ACNC: NOT DETECTED
RHINOVIRUS RNA SPEC NAA+PROBE: DETECTED
RSV RNA NPH QL NAA+NON-PROBE: NOT DETECTED
SARS-COV-2 RNA NPH QL NAA+NON-PROBE: NOT DETECTED

## 2024-07-03 PROCEDURE — 0202U NFCT DS 22 TRGT SARS-COV-2: CPT | Performed by: NURSE PRACTITIONER

## 2024-07-03 PROCEDURE — 97116 GAIT TRAINING THERAPY: CPT

## 2024-07-03 PROCEDURE — 99238 HOSP IP/OBS DSCHRG MGMT 30/<: CPT | Performed by: NURSE PRACTITIONER

## 2024-07-03 PROCEDURE — 71046 X-RAY EXAM CHEST 2 VIEWS: CPT

## 2024-07-03 PROCEDURE — 97530 THERAPEUTIC ACTIVITIES: CPT

## 2024-07-03 RX ORDER — ACETAMINOPHEN 325 MG/1
650 TABLET ORAL EVERY 6 HOURS PRN
Start: 2024-07-03

## 2024-07-03 RX ORDER — OXYCODONE HYDROCHLORIDE 5 MG/1
5 TABLET ORAL EVERY 8 HOURS PRN
Qty: 9 TABLET | Refills: 0 | Status: SHIPPED | OUTPATIENT
Start: 2024-07-03

## 2024-07-03 RX ORDER — DEXTROMETHORPHAN POLISTIREX 30 MG/5ML
60 SUSPENSION ORAL EVERY 12 HOURS SCHEDULED
Start: 2024-07-03 | End: 2024-07-12

## 2024-07-03 RX ORDER — LOSARTAN POTASSIUM 25 MG/1
25 TABLET ORAL
Qty: 30 TABLET | Refills: 1 | Status: SHIPPED | OUTPATIENT
Start: 2024-07-04

## 2024-07-03 RX ORDER — LIDOCAINE 4 G/G
1 PATCH TOPICAL
Qty: 5 EACH | Refills: 0 | Status: SHIPPED | OUTPATIENT
Start: 2024-07-04

## 2024-07-03 RX ADMIN — PANTOPRAZOLE SODIUM 40 MG: 40 TABLET, DELAYED RELEASE ORAL at 08:24

## 2024-07-03 RX ADMIN — BUSPIRONE HYDROCHLORIDE 5 MG: 10 TABLET ORAL at 08:24

## 2024-07-03 RX ADMIN — Medication 10 ML: at 08:27

## 2024-07-03 RX ADMIN — OXYCODONE HYDROCHLORIDE 5 MG: 5 TABLET ORAL at 03:59

## 2024-07-03 RX ADMIN — DORZOLAMIDE HYDROCHLORIDE AND TIMOLOL MALEATE 1 DROP: 20; 5 SOLUTION/ DROPS OPHTHALMIC at 08:28

## 2024-07-03 RX ADMIN — LIDOCAINE 1 PATCH: 4 PATCH TOPICAL at 08:24

## 2024-07-03 RX ADMIN — FERROUS SULFATE TAB 325 MG (65 MG ELEMENTAL FE) 325 MG: 325 (65 FE) TAB at 08:24

## 2024-07-03 RX ADMIN — SENNOSIDES AND DOCUSATE SODIUM 2 TABLET: 50; 8.6 TABLET ORAL at 08:24

## 2024-07-03 RX ADMIN — FUROSEMIDE 20 MG: 20 TABLET ORAL at 08:24

## 2024-07-03 NOTE — CASE MANAGEMENT/SOCIAL WORK
Continued Stay Note  [facility]     Patient Name: Mariia Roberson  MRN: [id number]  Today's Date: 7/3/2024    Admit Date: 6/28/2024    Plan: Home with family assist and [facility] for SN, PT & SW   Discharge Plan       Row Name 07/03/24 0916       Plan    Plan Home with family assist and [facility] for SN, PT & SW    Plan Comments CM spoke with patient and her daughter, Didi via phone. Discussed therapy recommendations with patient and daughter - IPR. They are declining rehab. Both would like patient to return home at [place] and are agreeable to continue home health services with [facility] for SN, PT & SW. CM spoke with negrito Nichols who confirmed acceptance. Family to provide transportation to home when medically ready. CM following.    Final Discharge Disposition Code 06 - home with home health care                   Discharge Codes    No documentation.                 Expected Discharge Date and Time       Expected Discharge Date Expected Discharge Time    Jul 8, 2024               Merline Beauchamp RN

## 2024-07-03 NOTE — PROGRESS NOTES
"    [facility]    Subjective   Mariia Roberson 1940 84 y.o. female     07/03/24    Chief complaint:back pain, cough    HPI  2 Days Post-Op vertebroplasties. Oskar has back pain as expected but much better. Her cough is worse, awaiting CXR.     Past Medical History:   Diagnosis Date    AAA (abdominal aortic aneurysm)     Arthritis     CHF (congestive heart failure)     Glaucoma     Hypertension     Stroke         BP (P) 116/84   Pulse (P) 104   Temp 98.7 °F (37.1 °C) (Oral)   Resp 16   Ht 152.4 cm (60\")   Wt 44 kg (97 lb)   SpO2 90%   BMI 18.94 kg/m²      Scheduled Meds:atorvastatin, 10 mg, Oral, Nightly  busPIRone, 5 mg, Oral, BID  dorzolamide-timolol, 1 drop, Right Eye, BID  ferrous sulfate, 325 mg, Oral, Daily With Breakfast  furosemide, 20 mg, Oral, Daily  latanoprost, 1 drop, Right Eye, Nightly  Lidocaine, 1 patch, Transdermal, Q24H  losartan, 25 mg, Oral, Q24H  pantoprazole, 40 mg, Oral, Daily  senna-docusate sodium, 2 tablet, Oral, BID  sodium chloride, 500 mL, Intravenous, Once  sodium chloride, 10 mL, Intravenous, Q12H  traZODone, 50 mg, Oral, Nightly        PRN Meds:.  senna-docusate sodium **AND** polyethylene glycol **AND** bisacodyl **AND** bisacodyl    Calcium Replacement - Follow Nurse / BPA Driven Protocol    hydrALAZINE    [Transfer Hold] LORazepam    Magnesium Standard Dose Replacement - Follow Nurse / BPA Driven Protocol    Morphine **AND** naloxone    ondansetron ODT **OR** ondansetron    oxyCODONE    Phosphorus Replacement - Follow Nurse / BPA Driven Protocol    Potassium Replacement - Follow Nurse / BPA Driven Protocol    [COMPLETED] Insert Peripheral IV **AND** sodium chloride    sodium chloride    sodium chloride    CBC:      Lab 07/02/24  0339 06/29/24  0449 06/28/24  1805   WBC 12.05* 10.89* 11.24*   HEMOGLOBIN 12.2 11.0* 12.2   HEMATOCRIT 38.7 35.1 38.9   PLATELETS 342 319 316   NEUTROS ABS  --  7.26* 7.52*   IMMATURE GRANS " "(ABS)  --  0.03 0.02   LYMPHS ABS  --  2.20 2.63   MONOS ABS  --  0.81 0.68   EOS ABS  --  0.49* 0.31   MCV 91.9 94.9 94.9      Basic Metabolic Panel    Sodium Sodium   Date Value Ref Range Status   07/02/2024 139 136 - 145 mmol/L Final   07/01/2024 141 136 - 145 mmol/L Final   06/30/2024 138 136 - 145 mmol/L Final      Potassium Potassium   Date Value Ref Range Status   07/02/2024 3.7 3.5 - 5.2 mmol/L Final   07/01/2024 4.0 3.5 - 5.2 mmol/L Final     Comment:     Slight hemolysis detected by analyzer. Result may be falsely elevated.   06/30/2024 3.8 3.5 - 5.2 mmol/L Final     Comment:     Slight hemolysis detected by analyzer. Result may be falsely elevated.      Chloride Chloride   Date Value Ref Range Status   07/02/2024 101 98 - 107 mmol/L Final   07/01/2024 102 98 - 107 mmol/L Final   06/30/2024 103 98 - 107 mmol/L Final      Bicarbonate No results found for: \"PLASMABICARB\"   BUN BUN   Date Value Ref Range Status   07/02/2024 19 8 - 23 mg/dL Final   07/01/2024 18 8 - 23 mg/dL Final   06/30/2024 17 8 - 23 mg/dL Final      Creatinine Creatinine   Date Value Ref Range Status   07/02/2024 1.10 (H) 0.57 - 1.00 mg/dL Final   07/01/2024 1.22 (H) 0.57 - 1.00 mg/dL Final   06/30/2024 1.12 (H) 0.57 - 1.00 mg/dL Final      Calcium Calcium   Date Value Ref Range Status   07/02/2024 9.0 8.6 - 10.5 mg/dL Final   07/01/2024 8.9 8.6 - 10.5 mg/dL Final   06/30/2024 8.6 8.6 - 10.5 mg/dL Final      Glucose      No components found for: \"GLUCOSE.*\"        Intake/Output                         07/01/24 0701 - 07/02/24 0700 07/02/24 0701 - 07/03/24 0700 0701-1900 1901-0700 Total 0701-1900 1901-0700 Total                 Intake    P.O.  240  180 420  720  220 940    Total Intake 240 180 420 720 220 940       Output    Urine  300  475 775  200  200 400    Total Output 300 475 775 200 200 400                 Neurologic Exam  A/A/C/O  Speech clear  Non productive cough  1L O2  Moves all 4's    MDM:  Assessment and plan:  1. S/p " treatment for T9 and T11 compression fx's. Osteoporosis.  2. Cough- cxr P  3. Chronic pain  4. Home when medically stable.      Any copied data from previous notes included in the (1) HPI, (2) PE, (3) MDM and/or Assessment and Plan has been reviewed and accurate as of this date.      Lida Mack PA-C  Current Attending Provider: Adrien العلي DO

## 2024-07-03 NOTE — DISCHARGE SUMMARY
[facility] Medicine Services  DISCHARGE SUMMARY    Patient Name: Mariia Roberson  : 1940  MRN: [id number]    Date of Admission: 2024 10:07 AM  Date of Discharge:  24  Primary Care Physician: Susan Waters APRN    Consults       Date and Time Order Name Status Description    2024  3:18 PM Inpatient Neurosurgery Consult Completed             Hospital Course     Presenting Problem: back pain    Active Hospital Problems    Diagnosis  POA    **Mult fractures of thoracic spine, closed [S22.009A]  Yes    Severe malnutrition [E43]  Yes    Multiple fractures of thoracic spine [S22.009A]  Yes    Intractable pain [R52]  Yes    HTN (hypertension) [I10]  Yes    Osteoporosis with pathological fracture of thoracic vertebra [M80.08XA]  Yes      Resolved Hospital Problems   No resolved problems to display.      Hospital Course:  Mariia Roberson is a 84 y.o. female with history of hypertension, CVA, CHF, glaucoma, AAA who presents to the ED with intractable back pain found to have multiple fractures of the thoracic spine     Subacute compression fractures at T9 and T11   Intractable back pain  -Neurosurgery following, s/p vertebroplasty  with Dr. Perez   -follow up 2 weeks  -Continue pain control  -cont PT/OT with      Hypertension  -BP was previously low, but trended up   -continue home losartan, lasix  -Of note, home lisinopril has been discontinued secondary to cough     Renal insufficiency  -Cr with slight bump 1.18-->1.34. Improved with fluids  -1.1 on date of dc     Hx of CHF (data deficient)  -seems compensated, lasix resumed      CVA  -continue statin      Discharge Follow Up Recommendations for outpatient labs/diagnostics:  PCP 1 week  Neurosurgery 2 weeks    Day of Discharge     HPI:   Increased cough, respiratory panel +rhinovirus  Daughter has declined rehab and states she will do better at home, patient agreeable    Review of Systems  Gen- No fevers,  chills  CV- No chest pain, palpitations  Resp- + cough, no dyspnea  GI- No N/V/D, abd pain      Vital Signs:   Temp:  [97.7 °F (36.5 °C)-98.9 °F (37.2 °C)] 98.9 °F (37.2 °C)  Heart Rate:  [] 105  Resp:  [16-18] 16  BP: ()/(57-84) 131/81  Flow (L/min):  [1] 1      Physical Exam:  Constitutional: No acute distress, awake, alert  HENT: NCAT, mucous membranes moist  Respiratory: Scattered rhonchi, improves with cough, respiratory effort normal, sats stable on RA  Cardiovascular: RRR, no murmurs, rubs, or gallops  Gastrointestinal: Positive bowel sounds, soft, nontender, nondistended  Musculoskeletal: No bilateral ankle edema  Psychiatric: Appropriate affect, cooperative  Neurologic: Oriented x 3, SHABAZZ, speech clear  Skin: No rashes noted    Pertinent  and/or Most Recent Results     LAB RESULTS:      Lab 07/02/24  0339 06/29/24  0449 06/28/24  1805   WBC 12.05* 10.89* 11.24*   HEMOGLOBIN 12.2 11.0* 12.2   HEMATOCRIT 38.7 35.1 38.9   PLATELETS 342 319 316   NEUTROS ABS  --  7.26* 7.52*   IMMATURE GRANS (ABS)  --  0.03 0.02   LYMPHS ABS  --  2.20 2.63   MONOS ABS  --  0.81 0.68   EOS ABS  --  0.49* 0.31   MCV 91.9 94.9 94.9         Lab 07/02/24  0339 07/01/24  1246 06/30/24  0936 06/29/24  0449 06/28/24  1805   SODIUM 139 141 138 140 141   POTASSIUM 3.7 4.0 3.8 3.5 3.7   CHLORIDE 101 102 103 104 103   CO2 25.0 25.0 25.0 27.0 25.0   ANION GAP 13.0 14.0 10.0 9.0 13.0   BUN 19 18 17 21 14   CREATININE 1.10* 1.22* 1.12* 1.34* 1.18*   EGFR 49.0* 43.3* 48.0* 38.7* 45.1*   GLUCOSE 98 81 126* 92 97   CALCIUM 9.0 8.9 8.6 8.5* 8.8         Lab 06/28/24  1805   TOTAL PROTEIN 6.9   ALBUMIN 3.6   GLOBULIN 3.3   ALT (SGPT) 12   AST (SGOT) 18   BILIRUBIN 0.4   ALK PHOS 114           Brief Urine Lab Results  (Last result in the past 365 days)        Color   Clarity   Blood   Leuk Est   Nitrite   Protein   CREAT   Urine HCG        06/30/24 1505 Yellow   Clear   Negative   Negative   Negative   30 mg/dL (1+)                  Microbiology Results (last 10 days)       Procedure Component Value - Date/Time    Respiratory Panel PCR w/COVID-19(SARS-CoV-2) SANCHEZ/REYNALDO/ERLIN/PAD/COR/FELICIA In-House, NP Swab in UTM/VTM, 2 HR TAT - Swab, Nasopharynx [[id number]]  (Abnormal) Collected: 07/03/24 0943    Lab Status: Final result Specimen: Swab from Nasopharynx Updated: 07/03/24 1109     ADENOVIRUS, PCR Not Detected     Coronavirus 229E Not Detected     Coronavirus HKU1 Not Detected     Coronavirus NL63 Not Detected     Coronavirus OC43 Not Detected     COVID19 Not Detected     Human Metapneumovirus Not Detected     Human Rhinovirus/Enterovirus Detected     Influenza A PCR Not Detected     Influenza B PCR Not Detected     Parainfluenza Virus 1 Not Detected     Parainfluenza Virus 2 Not Detected     Parainfluenza Virus 3 Not Detected     Parainfluenza Virus 4 Not Detected     RSV, PCR Not Detected     Bordetella pertussis pcr Not Detected     Bordetella parapertussis PCR Not Detected     Chlamydophila pneumoniae PCR Not Detected     Mycoplasma pneumo by PCR Not Detected    Narrative:      In the setting of a positive respiratory panel with a viral infection PLUS a negative procalcitonin without other underlying concern for bacterial infection, consider observing off antibiotics or discontinuation of antibiotics and continue supportive care. If the respiratory panel is positive for atypical bacterial infection (Bordetella pertussis, Chlamydophila pneumoniae, or Mycoplasma pneumoniae), consider antibiotic de-escalation to target atypical bacterial infection.            XR Chest PA & Lateral    Result Date: 7/3/2024  XR CHEST PA AND LATERAL Date of Exam: 7/3/2024 8:56 AM EDT Indication: increasing cough Comparison: None available. Findings: Cardiomediastinal silhouette demonstrates the presence of a descending thoracic aortic aneurysm. There is nonspecific interstitial prominence, likely chronic. There is a small left pleural effusion. No airspace disease or  pneumothorax. No acute osseous abnormality identified.     Impression: 1.Small left pleural effusion. No airspace disease identified. 2.Descending thoracic aortic aneurysm Electronically Signed: Stas Gupta MD  7/3/2024 9:25 AM EDT  Workstation ID: [id number]    Invasive peripheral vascular study    Result Date: 7/1/2024  Clinical indication: 84-year-old female admitted with intractable back pain, found to have acute T9 and T11 compression fractures. Primary diagnosis: 1.  Pathologic fracture of vertebral body secondary to osteoporosis 2.  Osteoporosis : Dr. Javier Perez. Access: Percutaneous transpedicular approach into the T9 and T11 vertebral body Estimated blood loss: Negligible Conscious sedation: Moderate sedation was provided by me for a total of 41 minutes.  Physical vitals were continuously monitored by an independently trained observer.  A total of 2 mg of Versed and 100 ug of fentanyl were administered intravenously. Procedures: 1.  Fluoroscopic guided percutaneous T9 kyphoplasty 2.  Fluoroscopic guided percutaneous T11 kyphoplasty 3.  Conscious sedation Complications: None apparent Technique:  Formal written consent for the procedure was obtained from the patient and family after explaining risks to include bleeding, infection, aberrant cement extrusion, spinal nerve injury, and failure to relieve pain.  The patient was placed prone on the biplane angiography table and the thoracolumbar region was prepped and draped in the usual, sterile fashion.  Local anesthesia with 1% lidocaine infiltration was achieved.  Additionally, intravenous fentanyl and Versed were given for patient comfort throughout the procedure, the details of which are documented in the nursing record.  Utilizing biplane fluoroscopic guidance, a single G21 trocar needle was placed into the T9 vertebral body via a right transpedicular approach.  A cavity was then created within the central portions of the T9 vertebral body  "utilizing a flex drill.  Next, approximately 3 mL of of a sterile methylmethacrylate/barium cement solution was instilled into the T9 vertebral body without difficulty.  The T9 vertebra was a near \"vertebral plana\" deformity, but there was good distribution of cement throughout the T9 vertebra.  Next, 2 G21 trocar needles were placed into the T11 vertebral body via transpedicular approach.  Augmentation of the T11 vertebra was then performed with G21 balloons.  Next, approximately 8 mL of a sterile methylmethacrylate/cement solution was instilled into the T11 vertebra without difficulty.  There was excellent distribution of cement throughout the T11 vertebra.  At the end of the procedure, all needles were removed and sterile dressing was applied to the puncture site.  The patient was transferred back to her floor bed for postprocedural care/recovery. Impression: Technically successful fluoroscopic percutaneous kyphoplasty of T9 and T11 compression fractures.     MRI Thoracic Spine Without Contrast    Result Date: 6/28/2024  MRI THORACIC SPINE WO CONTRAST Date of Exam: 6/28/2024 1:41 PM EDT Indication: worsening back pain with CT showing new T11 compression fracture.  Comparison: None available. Technique:  Routine multiplanar/multisequence sequence images of the thoracic spine were obtained without contrast administration. Findings: There is accentuation of the thoracic kyphosis inferiorly. There is a severe compression fracture at T9 and a moderate compression fracture at T11 with associated marrow edema. The other thoracic vertebral bodies appear normal in height. There is mild disc desiccation in the midthoracic spine. There is a mild disc bulge at T9-10. There is mild scattered facet arthropathy. The spinal canal and neural foramina are widely patent throughout. The spinal cord appears normal in signal throughout. The posterior paravertebral soft tissues are unremarkable.     Impression: 1.Severe compression " fracture at T9 and moderate compression fracture at T11 appear to represent subacute fractures. 2.Mild degenerative changes as described above. Electronically Signed: Willie Lee MD  6/28/2024 2:50 PM EDT  Workstation ID: [id number]       Discharge Details        Discharge Medications        New Medications        Instructions Start Date   acetaminophen 325 MG tablet  Commonly known as: Tylenol   650 mg, Oral, Every 6 Hours PRN      dextromethorphan polistirex ER 30 MG/5ML Suspension Extended Release oral suspension  Commonly known as: Delsym   60 mg, Oral, Every 12 Hours Scheduled      Lidocaine 4 %   1 patch, Transdermal, Every 24 Hours Scheduled, Remove & Discard patch within 12 hours or as directed by MD   Start Date: July 4, 2024     losartan 25 MG tablet  Commonly known as: COZAAR   25 mg, Oral, Every 24 Hours Scheduled   Start Date: July 4, 2024     oxyCODONE 5 MG immediate release tablet  Commonly known as: ROXICODONE   5 mg, Oral, Every 8 Hours PRN      PHARMACY MEDS TO BED CONSULT   Does not apply, Daily             Continue These Medications        Instructions Start Date   atorvastatin 10 MG tablet  Commonly known as: LIPITOR   10 mg, Oral, Daily      busPIRone 5 MG tablet  Commonly known as: BUSPAR   5 mg, Oral, 2 Times Daily      dorzolamide-timolol 2-0.5 % ophthalmic solution  Commonly known as: COSOPT   1 drop, 2 Times Daily      ferrous sulfate 325 (65 FE) MG tablet   325 mg, Oral, Daily With Breakfast      furosemide 20 MG tablet  Commonly known as: LASIX   20 mg, Oral, Daily      latanoprost 0.005 % ophthalmic solution  Commonly known as: XALATAN   1 drop, Right Eye, Nightly      pantoprazole 40 MG EC tablet  Commonly known as: PROTONIX   40 mg, Oral, Daily      potassium chloride 10 MEQ CR capsule  Commonly known as: MICRO-K   10 mEq, Oral, Daily      traZODone 50 MG tablet  Commonly known as: DESYREL   50 mg, Oral, Nightly             Stop These Medications      dorzolamide 2 % solution 1  drop, timolol 0.5 % solution 1 drop              No Known Allergies      Discharge Disposition:  Home or Self Care    Diet:  Hospital:  Diet Order   Procedures    Diet: Regular/House; Fluid Consistency: Thin (IDDSI 0)       Diet Instructions       Diet: Regular/House Diet; Thin (IDDSI 0)      Discharge Diet: Regular/House Diet    Fluid Consistency: Thin (IDDSI 0)             Activity:  Activity Instructions       Activity as Tolerated                 CODE STATUS:    Code Status and Medical Interventions:   Ordered at: 06/28/24 1610     Medical Intervention Limits:    No intubation (DNI)     Level Of Support Discussed With:    Patient    Next of Kin (If No Surrogate)     Code Status (Patient has no pulse and is not breathing):    No CPR (Do Not Attempt to Resuscitate)     Medical Interventions (Patient has pulse or is breathing):    Limited Support     Comments:    daughter       No future appointments.    Additional Instructions for the Follow-ups that You Need to Schedule       Ambulatory Referral to Home Health   As directed      Face to Face Visit Date: 7/3/2024   Follow-up provider for Plan of Care?: I treated the patient in an acute care facility and will not continue treatment after discharge.   Follow-up provider: ZI CRAWFORD [[id number]]   Reason/Clinical Findings: Multiple fractures of thoracic spine [S22.009A], HTN, Intractable pain [R52]   Describe mobility limitations that make leaving home difficult: impaired functional mobility, balance, gait and endurance   Nursing/Therapeutic Services Requested: Skilled Nursing Physical Therapy Medical / Social Work   Skilled nursing orders: Medication education Pain management Cardiopulmonary assessments   PT orders: Gait Training Transfer training Strengthening Home safety assessment   Weight Bearing Status: As Tolerated   Social work orders: Community resources Long range planning   Frequency: 1 Week 1        Discharge Follow-up with PCP   As directed        Currently Documented PCP:    Susan Waters APRN    PCP Phone Number:    [phone]     Follow Up Details: 1 week        Discharge Follow-up with Specified Provider: Neurosurgery; 2 Weeks   As directed      To: Neurosurgery   Follow Up: 2 Weeks                  DENISA Gardner  07/03/24      Time Spent on Discharge:  I spent  25  minutes on this discharge activity which included: face-to-face encounter with the patient, reviewing the data in the system, coordination of the care with the nursing staff as well as consultants, documentation, and entering orders.

## 2024-07-03 NOTE — PLAN OF CARE
Goal Outcome Evaluation:  Plan of Care Reviewed With: patient        Progress: improving  Outcome Evaluation: Patient continues to need assist with bed mobility due to pain. Her ambulation has improved and she is improving in overall strength. Recommend continued skilled PT and rehab at discharged      Anticipated Discharge Disposition (PT): inpatient rehabilitation facility

## 2024-07-03 NOTE — THERAPY TREATMENT NOTE
Patient Name: Mariia Roberson  : 1940    MRN: [id number]                              Today's Date: 7/3/2024       Admit Date: 2024    Visit Dx:     ICD-10-CM ICD-9-CM   1. Compression fracture of T9 vertebra, initial encounter  S22.070A 805.2   2. Compression fracture of T11 vertebra, initial encounter  S22.080A 805.2   3. Severe back pain  M54.9 724.5   4. Referred by health care professional  Z02.89 V68.89   5. Closed fracture of multiple thoracic vertebrae, initial encounter  S22.009A 805.2   6. Osteoporosis with pathological fracture of thoracic vertebra  M80.08XA 733.00     733.13   7. Intractable pain  R52 780.96     Patient Active Problem List   Diagnosis    Mult fractures of thoracic spine, closed    Intractable pain    HTN (hypertension)    Multiple fractures of thoracic spine    Osteoporosis with pathological fracture of thoracic vertebra    Severe malnutrition     Past Medical History:   Diagnosis Date    AAA (abdominal aortic aneurysm)     Arthritis     CHF (congestive heart failure)     Glaucoma     Hypertension     Stroke      Past Surgical History:   Procedure Laterality Date    INTERVENTIONAL RADIOLOGY PROCEDURE N/A 2024    Procedure: IR vertebroplasty thoracic with fluoroscopy, T9 and T10;  Surgeon: Javier Perez MD;  Location: [facility] INVASIVE LOCATION;  Service: Interventional Radiology;  Laterality: N/A;    JOINT REPLACEMENT        General Information       Row Name 24 1150          Physical Therapy Time and Intention    Document Type therapy note (daily note)  -SC     Mode of Treatment physical therapy  -SC       Row Name 24 1150          General Information    Patient Profile Reviewed yes  -SC     Existing Precautions/Restrictions fall;spinal;other (see comments)  L eye blindness, HX of CVA  -SC       Row Name 24 1150          Cognition    Orientation Status (Cognition) oriented x 3  -SC       Row Name 24 1150          Safety Issues, Functional  Mobility    Impairments Affecting Function (Mobility) balance;cognition;endurance/activity tolerance;pain;strength  -SC     Comment, Safety Issues/Impairments (Mobility) alert, following commands  -SC               User Key  (r) = Recorded By, (t) = Taken By, (c) = Cosigned By      Initials Name Provider Type    SC Wendy Kitchen PT Physical Therapist                   Mobility       Row Name 07/03/24 1151          Bed Mobility    Bed Mobility supine-sit;scooting/bridging;sit-supine  -SC     Scooting/Bridging Baggs (Bed Mobility) 1 person assist;minimum assist (75% patient effort)  -SC     Supine-Sit [place] (Bed Mobility) 1 person assist;minimum assist (75% patient effort)  -SC     Sit-Supine [place]gs (Bed Mobility) 1 person assist;moderate assist (50% patient effort)  -SC     Assistive Device (Bed Mobility) bed rails  -SC     Comment, (Bed Mobility) reviewed log rolling with patient. Required assistance and cues to preform correctly  -SC       Row Name 07/03/24 1151          Transfers    Comment, (Transfers) cue for hand placement  -[facility] Name 07/03/24 1151          Sit-Stand Transfer    Sit-Stand [place]gs (Transfers) contact guard;verbal cues  -SC     Assistive Device (Sit-Stand Transfers) walker, front-wheeled  -SC       Row Name 07/03/24 1151          Gait/Stairs (Locomotion)    Baggs Level (Gait) minimum assist (75% patient effort);verbal cues  -SC     Assistive Device (Gait) walker, front-wheeled  -SC     Distance in Feet (Gait) 44  -SC     Deviations/Abnormal Patterns (Gait) base of support, narrow;nya decreased;gait speed decreased;stride length decreased  -SC     Comment, (Gait/Stairs) Gt training focused on controling walker with upright posture. Cues to stay closer to walker. Patient did need help to turn walker.  -SC               User Key  (r) = Recorded By, (t) = Taken By, (c) = Cosigned By      Initials Name Provider Type    Wendy Daugherty PT Physical  Therapist                   Obj/Interventions       Row Name 07/03/24 1153          Balance    Dynamic Standing Balance 1-person assist;minimal assist;verbal cues  -SC     Position/Device Used, Standing Balance supported;walker, rolling  -SC     Comment, Balance no LOB  -SC               User Key  (r) = Recorded By, (t) = Taken By, (c) = Cosigned By      Initials Name Provider Type    SC Wendy Kitchen, PT Physical Therapist                   Goals/Plan    No documentation.                  Clinical Impression       Row Name 07/03/24 1153          Pain    Additional Documentation Pain Scale: FACES Pre/Post-Treatment (Group)  -SC       Row Name 07/03/24 1153          Pain Scale: FACES Pre/Post-Treatment    Pain: FACES Scale, Pretreatment 4-->hurts little more  -SC     Posttreatment Pain Rating 6-->hurts even more  -SC     Pain Location - back  -SC       Row Name 07/03/24 1153          Plan of Care Review    Plan of Care Reviewed With patient  -SC     Progress improving  -SC     Outcome Evaluation Patient continues to need assist with bed mobility due to pain. Her ambulation has improved and she is improving in overall strength. Recommend continued skilled PT and rehab at discharged  -SC       Row Name 07/03/24 1153          Therapy Assessment/Plan (PT)    Patient/Family Therapy Goals Statement (PT) did not state  -SC     Rehab Potential (PT) good, to achieve stated therapy goals  -SC     Criteria for Skilled Interventions Met (PT) yes;meets criteria;skilled treatment is necessary  -SC     Therapy Frequency (PT) daily  -SC       Row Name 07/03/24 1153          Positioning and Restraints    Pre-Treatment Position in bed  -SC     Post Treatment Position bed  -SC     In Bed supine;call light within reach;exit alarm on  -SC               User Key  (r) = Recorded By, (t) = Taken By, (c) = Cosigned By      Initials Name Provider Type    SC Wendy Kitchen, PT Physical Therapist                   Outcome Measures       [facility]  Name 07/03/24 1157 07/03/24 0800       How much help from another person do you currently need...    Turning from your back to your side while in flat bed without using bedrails? 3  -SC 3  -AC (r) EG (t) AC (c)    Moving from lying on back to sitting on the side of a flat bed without bedrails? 2  -SC 3  -AC (r) EG (t) AC (c)    Moving to and from a bed to a chair (including a wheelchair)? 3  -SC 2  -AC (r) EG (t) AC (c)    Standing up from a chair using your arms (e.g., wheelchair, bedside chair)? 3  -SC 2  -AC (r) EG (t) AC (c)    Climbing 3-5 steps with a railing? 2  -SC 2  -AC (r) EG (t) AC (c)    To walk in hospital room? 3  -SC 2  -AC (r) EG (t) AC (c)    AM-PAC 6 Clicks Score (PT) 16  -SC 14  -AC (r) EG (t)    Highest Level of Mobility Goal 5 --> Static standing  -SC 4 --> Transfer to chair/commode  -AC (r) EG (t)      Row Name 07/03/24 1157          Functional Assessment    Outcome Measure Options AM-PAC 6 Clicks Basic Mobility (PT)  -SC               User Key  (r) = Recorded By, (t) = Taken By, (c) = Cosigned By      Initials Name Provider Type    SC Wendy Kitchen PT Physical Therapist    Gaby Bautista RN Registered Nurse    Erika Zamora MA Medical Assistant                                 Physical Therapy Education       Title: PT OT SLP Therapies (In Progress)       Topic: Physical Therapy (Done)       Point: Mobility training (Done)       Learning Progress Summary             Patient ANABELLA Huffman VU, DU by SC at 7/3/2024 1158    Comment: Reviewed benefits of walking    Acceptance, E,D, NR by MB at 7/2/2024 1511                         Point: Home exercise program (Done)       Learning Progress Summary             Patient ANABELLA Huffman VU, DU by SC at 7/3/2024 1158    Comment: Reviewed benefits of walking    Acceptance, E,D, NR by MB at 7/2/2024 1511                         Point: Body mechanics (Done)       Learning Progress Summary             Patient ANABELLA Huffman VU, DU by SC at 7/3/2024 3688     Comment: Reviewed benefits of walking    Acceptance, E,D, NR by MB at 7/2/2024 1511                         Point: Precautions (Done)       Learning Progress Summary             Patient Armida, ANABELLA, VU,DU by SC at 7/3/2024 1158    Comment: Reviewed benefits of walking    Acceptance, E,D, NR by MB at 7/2/2024 1511                                         User Key       Initials Effective Dates Name Provider Type Discipline    SC 02/03/23 -  Wendy Kitchen, PT Physical Therapist PT    MB 06/16/21 -  Laurie Begum PT Physical Therapist PT                  PT Recommendation and Plan     Plan of Care Reviewed With: patient  Progress: improving  Outcome Evaluation: Patient continues to need assist with bed mobility due to pain. Her ambulation has improved and she is improving in overall strength. Recommend continued skilled PT and rehab at discharged     Time Calculation:         PT Charges       Row Name 07/03/24 0942             Time Calculation    Start Time 0942  -SC      PT Received On 07/03/24  -SC      PT Goal Re-Cert Due Date 07/12/24  -SC         Timed Charges    47621 - Gait Training Minutes  14  -SC      52272 - PT Therapeutic Activity Minutes 14  -SC         Total Minutes    Timed Charges Total Minutes 28  -SC       Total Minutes 28  -SC                User Key  (r) = Recorded By, (t) = Taken By, (c) = Cosigned By      Initials Name Provider Type    SC Wendy Kitchen, PT Physical Therapist                  Therapy Charges for Today       Code Description Service Date Service Provider Modifiers Qty    20602376976 HC GAIT TRAINING EA 15 MIN 7/3/2024 Wendy Kitchen, PT GP 1    78061958051 HC PT THERAPEUTIC ACT EA 15 MIN 7/3/2024 Wendy Kitchen, PT GP 1            PT G-Codes  Outcome Measure Options: AM-PAC 6 Clicks Basic Mobility (PT)  AM-PAC 6 Clicks Score (PT): 16  AM-PAC 6 Clicks Score (OT): 14  PT Discharge Summary  Anticipated Discharge Disposition (PT): inpatient rehabilitation facility    Wendy ARMENTA  Fidelina, PT  7/3/2024

## 2024-07-03 NOTE — PLAN OF CARE
Problem: Adult Inpatient Plan of Care  Goal: Absence of Hospital-Acquired Illness or Injury  Intervention: Identify and Manage Fall Risk  Recent Flowsheet Documentation  Taken 7/3/2024 0017 by Merry Howard RN  Safety Promotion/Fall Prevention:   activity supervised   assistive device/personal items within reach   clutter free environment maintained   lighting adjusted   nonskid shoes/slippers when out of bed   room organization consistent   safety round/check completed  Taken 7/2/2024 2202 by Merry Howard RN  Safety Promotion/Fall Prevention:   activity supervised   assistive device/personal items within reach   clutter free environment maintained   lighting adjusted   nonskid shoes/slippers when out of bed   safety round/check completed   room organization consistent  Taken 7/2/2024 2028 by Merry Howard RN  Safety Promotion/Fall Prevention:   activity supervised   assistive device/personal items within reach   clutter free environment maintained   lighting adjusted   nonskid shoes/slippers when out of bed   room organization consistent   safety round/check completed  Intervention: Prevent Skin Injury  Recent Flowsheet Documentation  Taken 7/3/2024 0017 by Merry Howard RN  Body Position: position changed independently  Skin Protection:   adhesive use limited   incontinence pads utilized   tubing/devices free from skin contact   skin-to-skin areas padded   skin-to-device areas padded   skin sealant/moisture barrier applied  Taken 7/2/2024 2202 by Merry Howard RN  Body Position: position changed independently  Skin Protection:   adhesive use limited   incontinence pads utilized   tubing/devices free from skin contact   skin-to-skin areas padded   skin-to-device areas padded  Taken 7/2/2024 2028 by Merry Howard RN  Body Position: position changed independently  Skin Protection:   adhesive use limited   incontinence pads utilized   tubing/devices free from skin contact   skin-to-skin areas padded    skin-to-device areas padded   skin sealant/moisture barrier applied  Intervention: Prevent and Manage VTE (Venous Thromboembolism) Risk  Recent Flowsheet Documentation  Taken 7/3/2024 0017 by Merry Howard RN  VTE Prevention/Management: patient refused intervention  Taken 7/2/2024 2202 by Merry Howard RN  VTE Prevention/Management: patient refused intervention  Taken 7/2/2024 2028 by Merry Howard RN  Activity Management: standing at bedside  VTE Prevention/Management: patient refused intervention  Range of Motion: active ROM (range of motion) encouraged  Intervention: Prevent Infection  Recent Flowsheet Documentation  Taken 7/3/2024 0017 by Merry Howard RN  Infection Prevention:   environmental surveillance performed   visitors restricted/screened   hand hygiene promoted  Taken 7/2/2024 2202 by Merry Howard RN  Infection Prevention:   environmental surveillance performed   hand hygiene promoted   rest/sleep promoted   single patient room provided  Taken 7/2/2024 2028 by Merry Howard RN  Infection Prevention:   environmental surveillance performed   hand hygiene promoted   rest/sleep promoted   single patient room provided  Goal: Optimal Comfort and Wellbeing  Intervention: Monitor Pain and Promote Comfort  Recent Flowsheet Documentation  Taken 7/3/2024 0359 by Merry Howard RN  Pain Management Interventions:   see MAR   pillow support provided   position adjusted   pain management plan reviewed with patient/caregiver  Taken 7/2/2024 2202 by Merry Howard RN  Pain Management Interventions:   pain management plan reviewed with patient/caregiver   no interventions per patient request  Intervention: Provide Person-Centered Care  Recent Flowsheet Documentation  Taken 7/2/2024 2028 by Merry Howard RN  Trust Relationship/Rapport:   care explained   choices provided   emotional support provided   empathic listening provided   questions answered   questions encouraged   reassurance provided   thoughts/feelings  acknowledged   Goal Outcome Evaluation: increased confusion each time after waking. Able to reorient each time. Room moved closer to nursing station. Daughter informed of move. Up to BSC with assist of staff. Voiding without difficulty. Purewick used during HS per pt request for incontinence when sleeping. Pain tolerable with PO meds.

## 2024-07-04 ENCOUNTER — READMISSION MANAGEMENT (OUTPATIENT)
Dept: CALL CENTER | Facility: HOSPITAL | Age: 84
End: 2024-07-04
Payer: MEDICARE

## 2024-07-04 NOTE — OUTREACH NOTE
Prep Survey      Flowsheet Row Responses   [facility] facility patient discharged from? [place]   Is LACE score < 7 ? No   Eligibility Readm Mgmt   Discharge diagnosis Mult fractures of thoracic spine, closed,  IR vertebroplasty thoracic with fluoroscopy, T9 and T10   Does the patient have one of the following disease processes/diagnoses(primary or secondary)? Other   Does the patient have Home health ordered? Yes   What is the Home health agency?  [facility]   Is there a DME ordered? No   Prep survey completed? Yes            Jeanna MCKEE - Registered Nurse

## 2024-07-09 ENCOUNTER — READMISSION MANAGEMENT (OUTPATIENT)
Dept: CALL CENTER | Facility: HOSPITAL | Age: 84
End: 2024-07-09
Payer: MEDICARE

## 2024-07-09 NOTE — OUTREACH NOTE
Medical Week 1 Survey      Flowsheet Row Responses   [facility] patient discharged from? [place]   Does the patient have one of the following disease processes/diagnoses(primary or secondary)? Other   Week 1 attempt successful? Yes   Call start time 1254   Call end time 1256   Discharge diagnosis Mult fractures of thoracic spine, closed,  IR vertebroplasty thoracic with fluoroscopy, T9 and T10   Is patient permission given to speak with other caregiver? Yes   Meds reviewed with patient/caregiver? Yes   Is the patient having any side effects they believe may be caused by any medication additions or changes? No   Does the patient have all medications ordered at discharge? Yes   Is the patient taking all medications as directed (includes completed medication regime)? Yes   Does the patient have a primary care provider?  Yes   Comments regarding PCP Patient has seen her pcp   Has the patient kept scheduled appointments due by today? Yes   What is the Home health agency?  [facility]    Has home health visited the patient within 72 hours of discharge? Yes   Psychosocial issues? No   Did the patient receive a copy of their discharge instructions? Yes   Nursing interventions Reviewed instructions with patient   What is the patient's perception of their health status since discharge? Improving   Is the patient/caregiver able to teach back signs and symptoms related to disease process for when to call PCP? Yes   Is the patient/caregiver able to teach back signs and symptoms related to disease process for when to call 911? Yes   Is the patient/caregiver able to teach back the hierarchy of who to call/visit for symptoms/problems? PCP, Specialist, Home health nurse, Urgent Care, ED, 911 Yes   If the patient is a current smoker, are they able to teach back resources for cessation? Not a smoker   Week 1 call completed? Yes   Call end time 1256            Janet Moise Nurse

## 2024-07-12 ENCOUNTER — APPOINTMENT (OUTPATIENT)
Dept: CT IMAGING | Facility: HOSPITAL | Age: 84
End: 2024-07-12
Payer: MEDICARE

## 2024-07-12 ENCOUNTER — READMISSION MANAGEMENT (OUTPATIENT)
Dept: CALL CENTER | Facility: HOSPITAL | Age: 84
End: 2024-07-12
Payer: MEDICARE

## 2024-07-12 ENCOUNTER — APPOINTMENT (OUTPATIENT)
Dept: GENERAL RADIOLOGY | Facility: HOSPITAL | Age: 84
End: 2024-07-12
Payer: MEDICARE

## 2024-07-12 ENCOUNTER — HOSPITAL ENCOUNTER (INPATIENT)
Facility: HOSPITAL | Age: 84
LOS: 2 days | Discharge: HOME HEALTH | End: 2024-07-15
Attending: EMERGENCY MEDICINE | Admitting: INTERNAL MEDICINE
Payer: MEDICARE

## 2024-07-12 DIAGNOSIS — R82.81 PYURIA: ICD-10-CM

## 2024-07-12 DIAGNOSIS — K92.1 MELENA: Primary | ICD-10-CM

## 2024-07-12 DIAGNOSIS — N17.9 ACUTE KIDNEY INJURY: ICD-10-CM

## 2024-07-12 DIAGNOSIS — I71.43 INFRARENAL ABDOMINAL AORTIC ANEURYSM (AAA) WITHOUT RUPTURE: ICD-10-CM

## 2024-07-12 DIAGNOSIS — D64.9 ANEMIA, UNSPECIFIED TYPE: ICD-10-CM

## 2024-07-12 DIAGNOSIS — E43 SEVERE MALNUTRITION: ICD-10-CM

## 2024-07-12 DIAGNOSIS — S22.009A CLOSED FRACTURE OF MULTIPLE THORACIC VERTEBRAE, INITIAL ENCOUNTER: ICD-10-CM

## 2024-07-12 DIAGNOSIS — D62 ACUTE BLOOD LOSS ANEMIA: ICD-10-CM

## 2024-07-12 DIAGNOSIS — D72.829 LEUKOCYTOSIS, UNSPECIFIED TYPE: ICD-10-CM

## 2024-07-12 DIAGNOSIS — R52 INTRACTABLE PAIN: ICD-10-CM

## 2024-07-12 DIAGNOSIS — M80.08XA OSTEOPOROSIS WITH PATHOLOGICAL FRACTURE OF THORACIC VERTEBRA: ICD-10-CM

## 2024-07-12 PROBLEM — Z98.890 PREVIOUS BACK SURGERY: Status: ACTIVE | Noted: 2024-07-12

## 2024-07-12 PROBLEM — K92.2 GIB (GASTROINTESTINAL BLEEDING): Status: ACTIVE | Noted: 2024-07-12

## 2024-07-12 PROBLEM — R05.9 COUGH: Status: ACTIVE | Noted: 2024-07-12

## 2024-07-12 PROBLEM — F41.9 ANXIETY: Status: ACTIVE | Noted: 2024-07-12

## 2024-07-12 PROBLEM — I50.9 CHF (CONGESTIVE HEART FAILURE): Status: ACTIVE | Noted: 2024-07-12

## 2024-07-12 PROBLEM — I71.40 AAA (ABDOMINAL AORTIC ANEURYSM): Status: ACTIVE | Noted: 2024-07-12

## 2024-07-12 LAB
ABO GROUP BLD: NORMAL
ABO GROUP BLD: NORMAL
ALBUMIN SERPL-MCNC: 3.5 G/DL (ref 3.5–5.2)
ALBUMIN/GLOB SERPL: 0.9 G/DL
ALP SERPL-CCNC: 150 U/L (ref 39–117)
ALT SERPL W P-5'-P-CCNC: 10 U/L (ref 1–33)
ANION GAP SERPL CALCULATED.3IONS-SCNC: 10 MMOL/L (ref 5–15)
AST SERPL-CCNC: 16 U/L (ref 1–32)
B PARAPERT DNA SPEC QL NAA+PROBE: NOT DETECTED
B PERT DNA SPEC QL NAA+PROBE: NOT DETECTED
BACTERIA UR QL AUTO: ABNORMAL /HPF
BASOPHILS # BLD AUTO: 0.07 10*3/MM3 (ref 0–0.2)
BASOPHILS NFR BLD AUTO: 0.4 % (ref 0–1.5)
BILIRUB SERPL-MCNC: 0.3 MG/DL (ref 0–1.2)
BILIRUB UR QL STRIP: NEGATIVE
BLD GP AB SCN SERPL QL: NEGATIVE
BUN SERPL-MCNC: 20 MG/DL (ref 8–23)
BUN/CREAT SERPL: 15.2 (ref 7–25)
C PNEUM DNA NPH QL NAA+NON-PROBE: NOT DETECTED
CALCIUM SPEC-SCNC: 9.1 MG/DL (ref 8.6–10.5)
CHLORIDE SERPL-SCNC: 97 MMOL/L (ref 98–107)
CLARITY UR: ABNORMAL
CO2 SERPL-SCNC: 31 MMOL/L (ref 22–29)
COLOR UR: YELLOW
CREAT BLDA-MCNC: 1.4 MG/DL (ref 0.6–1.3)
CREAT SERPL-MCNC: 1.32 MG/DL (ref 0.57–1)
CRP SERPL-MCNC: 7.63 MG/DL (ref 0–0.5)
D DIMER PPP FEU-MCNC: 1.11 MCGFEU/ML (ref 0–0.86)
D-LACTATE SERPL-SCNC: 1.5 MMOL/L (ref 0.5–2)
DEPRECATED RDW RBC AUTO: 43.5 FL (ref 37–54)
DEVELOPER EXPIRATION DATE: 0
DEVELOPER LOT NUMBER: 0
EGFRCR SERPLBLD CKD-EPI 2021: 39.4 ML/MIN/1.73
EOSINOPHIL # BLD AUTO: 0.17 10*3/MM3 (ref 0–0.4)
EOSINOPHIL NFR BLD AUTO: 1 % (ref 0.3–6.2)
ERYTHROCYTE [DISTWIDTH] IN BLOOD BY AUTOMATED COUNT: 12.9 % (ref 12.3–15.4)
ERYTHROCYTE [SEDIMENTATION RATE] IN BLOOD: 73 MM/HR (ref 0–30)
EXPIRATION DATE: ABNORMAL
FECAL OCCULT BLOOD SCREEN, POC: POSITIVE
FLUAV SUBTYP SPEC NAA+PROBE: NOT DETECTED
FLUBV RNA ISLT QL NAA+PROBE: NOT DETECTED
GLOBULIN UR ELPH-MCNC: 3.8 GM/DL
GLUCOSE SERPL-MCNC: 131 MG/DL (ref 65–99)
GLUCOSE UR STRIP-MCNC: NEGATIVE MG/DL
HADV DNA SPEC NAA+PROBE: NOT DETECTED
HCOV 229E RNA SPEC QL NAA+PROBE: NOT DETECTED
HCOV HKU1 RNA SPEC QL NAA+PROBE: NOT DETECTED
HCOV NL63 RNA SPEC QL NAA+PROBE: NOT DETECTED
HCOV OC43 RNA SPEC QL NAA+PROBE: NOT DETECTED
HCT VFR BLD AUTO: 30.1 % (ref 34–46.6)
HCT VFR BLD AUTO: 33.8 % (ref 34–46.6)
HGB BLD-MCNC: 10.7 G/DL (ref 12–15.9)
HGB BLD-MCNC: 9.6 G/DL (ref 12–15.9)
HGB UR QL STRIP.AUTO: ABNORMAL
HMPV RNA NPH QL NAA+NON-PROBE: NOT DETECTED
HOLD SPECIMEN: NORMAL
HPIV1 RNA ISLT QL NAA+PROBE: NOT DETECTED
HPIV2 RNA SPEC QL NAA+PROBE: NOT DETECTED
HPIV3 RNA NPH QL NAA+PROBE: NOT DETECTED
HPIV4 P GENE NPH QL NAA+PROBE: NOT DETECTED
HYALINE CASTS UR QL AUTO: ABNORMAL /LPF
IMM GRANULOCYTES # BLD AUTO: 0.07 10*3/MM3 (ref 0–0.05)
IMM GRANULOCYTES NFR BLD AUTO: 0.4 % (ref 0–0.5)
KETONES UR QL STRIP: ABNORMAL
LEUKOCYTE ESTERASE UR QL STRIP.AUTO: ABNORMAL
LYMPHOCYTES # BLD AUTO: 3.6 10*3/MM3 (ref 0.7–3.1)
LYMPHOCYTES NFR BLD AUTO: 22.2 % (ref 19.6–45.3)
Lab: ABNORMAL
M PNEUMO IGG SER IA-ACNC: NOT DETECTED
MCH RBC QN AUTO: 29.3 PG (ref 26.6–33)
MCHC RBC AUTO-ENTMCNC: 31.7 G/DL (ref 31.5–35.7)
MCV RBC AUTO: 92.6 FL (ref 79–97)
MONOCYTES # BLD AUTO: 0.84 10*3/MM3 (ref 0.1–0.9)
MONOCYTES NFR BLD AUTO: 5.2 % (ref 5–12)
NEGATIVE CONTROL: NEGATIVE
NEUTROPHILS NFR BLD AUTO: 11.45 10*3/MM3 (ref 1.7–7)
NEUTROPHILS NFR BLD AUTO: 70.8 % (ref 42.7–76)
NITRITE UR QL STRIP: NEGATIVE
NRBC BLD AUTO-RTO: 0 /100 WBC (ref 0–0.2)
NT-PROBNP SERPL-MCNC: 970.8 PG/ML (ref 0–1800)
OSMOLALITY UR: 454 MOSM/KG (ref 300–1100)
PH UR STRIP.AUTO: 5.5 [PH] (ref 5–8)
PLATELET # BLD AUTO: 457 10*3/MM3 (ref 140–450)
PMV BLD AUTO: 10.2 FL (ref 6–12)
POSITIVE CONTROL: POSITIVE
POTASSIUM SERPL-SCNC: 3.6 MMOL/L (ref 3.5–5.2)
PROCALCITONIN SERPL-MCNC: 0.07 NG/ML (ref 0–0.25)
PROT SERPL-MCNC: 7.3 G/DL (ref 6–8.5)
PROT UR QL STRIP: ABNORMAL
RBC # BLD AUTO: 3.65 10*6/MM3 (ref 3.77–5.28)
RBC # UR STRIP: ABNORMAL /HPF
REF LAB TEST METHOD: ABNORMAL
RH BLD: POSITIVE
RH BLD: POSITIVE
RHINOVIRUS RNA SPEC NAA+PROBE: NOT DETECTED
RSV RNA NPH QL NAA+NON-PROBE: NOT DETECTED
SARS-COV-2 RNA NPH QL NAA+NON-PROBE: NOT DETECTED
SODIUM SERPL-SCNC: 138 MMOL/L (ref 136–145)
SODIUM UR-SCNC: 35 MMOL/L
SP GR UR STRIP: 1.02 (ref 1–1.03)
SQUAMOUS #/AREA URNS HPF: ABNORMAL /HPF
T&S EXPIRATION DATE: NORMAL
UROBILINOGEN UR QL STRIP: ABNORMAL
WBC # UR STRIP: ABNORMAL /HPF
WBC NRBC COR # BLD AUTO: 16.2 10*3/MM3 (ref 3.4–10.8)
WHOLE BLOOD HOLD COAG: NORMAL
WHOLE BLOOD HOLD SPECIMEN: NORMAL

## 2024-07-12 PROCEDURE — 82570 ASSAY OF URINE CREATININE: CPT | Performed by: NURSE PRACTITIONER

## 2024-07-12 PROCEDURE — 84300 ASSAY OF URINE SODIUM: CPT | Performed by: NURSE PRACTITIONER

## 2024-07-12 PROCEDURE — 83935 ASSAY OF URINE OSMOLALITY: CPT | Performed by: NURSE PRACTITIONER

## 2024-07-12 PROCEDURE — 86850 RBC ANTIBODY SCREEN: CPT

## 2024-07-12 PROCEDURE — 83605 ASSAY OF LACTIC ACID: CPT

## 2024-07-12 PROCEDURE — 83880 ASSAY OF NATRIURETIC PEPTIDE: CPT | Performed by: NURSE PRACTITIONER

## 2024-07-12 PROCEDURE — 86901 BLOOD TYPING SEROLOGIC RH(D): CPT

## 2024-07-12 PROCEDURE — 93005 ELECTROCARDIOGRAM TRACING: CPT | Performed by: NURSE PRACTITIONER

## 2024-07-12 PROCEDURE — 85014 HEMATOCRIT: CPT | Performed by: NURSE PRACTITIONER

## 2024-07-12 PROCEDURE — 87040 BLOOD CULTURE FOR BACTERIA: CPT | Performed by: EMERGENCY MEDICINE

## 2024-07-12 PROCEDURE — G0378 HOSPITAL OBSERVATION PER HR: HCPCS

## 2024-07-12 PROCEDURE — 84145 PROCALCITONIN (PCT): CPT | Performed by: EMERGENCY MEDICINE

## 2024-07-12 PROCEDURE — 36415 COLL VENOUS BLD VENIPUNCTURE: CPT

## 2024-07-12 PROCEDURE — 87086 URINE CULTURE/COLONY COUNT: CPT | Performed by: EMERGENCY MEDICINE

## 2024-07-12 PROCEDURE — 99285 EMERGENCY DEPT VISIT HI MDM: CPT

## 2024-07-12 PROCEDURE — 86900 BLOOD TYPING SEROLOGIC ABO: CPT

## 2024-07-12 PROCEDURE — 82565 ASSAY OF CREATININE: CPT | Performed by: EMERGENCY MEDICINE

## 2024-07-12 PROCEDURE — 25810000003 SODIUM CHLORIDE 0.9 % SOLUTION: Performed by: NURSE PRACTITIONER

## 2024-07-12 PROCEDURE — 82270 OCCULT BLOOD FECES: CPT

## 2024-07-12 PROCEDURE — 71045 X-RAY EXAM CHEST 1 VIEW: CPT

## 2024-07-12 PROCEDURE — 86140 C-REACTIVE PROTEIN: CPT | Performed by: NURSE PRACTITIONER

## 2024-07-12 PROCEDURE — 85025 COMPLETE CBC W/AUTO DIFF WBC: CPT

## 2024-07-12 PROCEDURE — 81001 URINALYSIS AUTO W/SCOPE: CPT | Performed by: EMERGENCY MEDICINE

## 2024-07-12 PROCEDURE — 25010000002 HYDROMORPHONE PER 4 MG: Performed by: EMERGENCY MEDICINE

## 2024-07-12 PROCEDURE — 85379 FIBRIN DEGRADATION QUANT: CPT | Performed by: NURSE PRACTITIONER

## 2024-07-12 PROCEDURE — 25510000001 IOPAMIDOL PER 1 ML: Performed by: EMERGENCY MEDICINE

## 2024-07-12 PROCEDURE — 80053 COMPREHEN METABOLIC PANEL: CPT

## 2024-07-12 PROCEDURE — 85018 HEMOGLOBIN: CPT | Performed by: NURSE PRACTITIONER

## 2024-07-12 PROCEDURE — 0202U NFCT DS 22 TRGT SARS-COV-2: CPT | Performed by: EMERGENCY MEDICINE

## 2024-07-12 PROCEDURE — 74178 CT ABD&PLV WO CNTR FLWD CNTR: CPT

## 2024-07-12 PROCEDURE — 85652 RBC SED RATE AUTOMATED: CPT | Performed by: NURSE PRACTITIONER

## 2024-07-12 PROCEDURE — 99222 1ST HOSP IP/OBS MODERATE 55: CPT | Performed by: NURSE PRACTITIONER

## 2024-07-12 RX ORDER — BRIMONIDINE TARTRATE 2 MG/ML
1 SOLUTION/ DROPS OPHTHALMIC 2 TIMES DAILY
COMMUNITY

## 2024-07-12 RX ORDER — SODIUM CHLORIDE 9 MG/ML
75 INJECTION, SOLUTION INTRAVENOUS CONTINUOUS
Status: ACTIVE | OUTPATIENT
Start: 2024-07-12 | End: 2024-07-13

## 2024-07-12 RX ORDER — BUSPIRONE HYDROCHLORIDE 5 MG/1
5 TABLET ORAL 2 TIMES DAILY
Status: DISCONTINUED | OUTPATIENT
Start: 2024-07-12 | End: 2024-07-15 | Stop reason: HOSPADM

## 2024-07-12 RX ORDER — DOXYCYCLINE 100 MG/1
100 CAPSULE ORAL 2 TIMES DAILY
COMMUNITY

## 2024-07-12 RX ORDER — PANTOPRAZOLE SODIUM 40 MG/10ML
40 INJECTION, POWDER, LYOPHILIZED, FOR SOLUTION INTRAVENOUS EVERY 12 HOURS
Status: DISCONTINUED | OUTPATIENT
Start: 2024-07-13 | End: 2024-07-13

## 2024-07-12 RX ORDER — DORZOLAMIDE HYDROCHLORIDE AND TIMOLOL MALEATE 20; 5 MG/ML; MG/ML
SOLUTION/ DROPS OPHTHALMIC 2 TIMES DAILY
Status: DISCONTINUED | OUTPATIENT
Start: 2024-07-13 | End: 2024-07-13

## 2024-07-12 RX ORDER — SODIUM CHLORIDE 0.9 % (FLUSH) 0.9 %
10 SYRINGE (ML) INJECTION AS NEEDED
Status: DISCONTINUED | OUTPATIENT
Start: 2024-07-12 | End: 2024-07-15 | Stop reason: HOSPADM

## 2024-07-12 RX ORDER — ATORVASTATIN CALCIUM 10 MG/1
10 TABLET, FILM COATED ORAL NIGHTLY
Status: DISCONTINUED | OUTPATIENT
Start: 2024-07-12 | End: 2024-07-15 | Stop reason: HOSPADM

## 2024-07-12 RX ORDER — TRAZODONE HYDROCHLORIDE 50 MG/1
50 TABLET ORAL NIGHTLY
Status: DISCONTINUED | OUTPATIENT
Start: 2024-07-12 | End: 2024-07-15 | Stop reason: HOSPADM

## 2024-07-12 RX ORDER — HYDROMORPHONE HYDROCHLORIDE 1 MG/ML
0.25 INJECTION, SOLUTION INTRAMUSCULAR; INTRAVENOUS; SUBCUTANEOUS ONCE
Status: COMPLETED | OUTPATIENT
Start: 2024-07-12 | End: 2024-07-12

## 2024-07-12 RX ORDER — IPRATROPIUM BROMIDE AND ALBUTEROL SULFATE 2.5; .5 MG/3ML; MG/3ML
3 SOLUTION RESPIRATORY (INHALATION) EVERY 4 HOURS PRN
Status: DISCONTINUED | OUTPATIENT
Start: 2024-07-12 | End: 2024-07-15 | Stop reason: HOSPADM

## 2024-07-12 RX ORDER — LATANOPROST 50 UG/ML
1 SOLUTION/ DROPS OPHTHALMIC NIGHTLY
Status: DISCONTINUED | OUTPATIENT
Start: 2024-07-13 | End: 2024-07-15 | Stop reason: HOSPADM

## 2024-07-12 RX ORDER — BRIMONIDINE TARTRATE 2 MG/ML
1 SOLUTION/ DROPS OPHTHALMIC 2 TIMES DAILY
Status: DISCONTINUED | OUTPATIENT
Start: 2024-07-13 | End: 2024-07-15 | Stop reason: HOSPADM

## 2024-07-12 RX ORDER — OXYCODONE HYDROCHLORIDE 5 MG/1
5 TABLET ORAL EVERY 8 HOURS PRN
Status: DISCONTINUED | OUTPATIENT
Start: 2024-07-12 | End: 2024-07-14

## 2024-07-12 RX ORDER — IPRATROPIUM BROMIDE AND ALBUTEROL SULFATE 2.5; .5 MG/3ML; MG/3ML
3 SOLUTION RESPIRATORY (INHALATION)
Status: DISCONTINUED | OUTPATIENT
Start: 2024-07-13 | End: 2024-07-15 | Stop reason: HOSPADM

## 2024-07-12 RX ORDER — DOXYCYCLINE 100 MG/1
100 CAPSULE ORAL 2 TIMES DAILY
Qty: 7 CAPSULE | Refills: 0 | Status: DISCONTINUED | OUTPATIENT
Start: 2024-07-12 | End: 2024-07-13

## 2024-07-12 RX ORDER — FERROUS SULFATE 325(65) MG
325 TABLET ORAL
Status: DISCONTINUED | OUTPATIENT
Start: 2024-07-13 | End: 2024-07-15 | Stop reason: HOSPADM

## 2024-07-12 RX ORDER — SODIUM CHLORIDE 0.9 % (FLUSH) 0.9 %
10 SYRINGE (ML) INJECTION EVERY 12 HOURS SCHEDULED
Status: DISCONTINUED | OUTPATIENT
Start: 2024-07-12 | End: 2024-07-15 | Stop reason: HOSPADM

## 2024-07-12 RX ORDER — SODIUM CHLORIDE 9 MG/ML
40 INJECTION, SOLUTION INTRAVENOUS AS NEEDED
Status: DISCONTINUED | OUTPATIENT
Start: 2024-07-12 | End: 2024-07-15 | Stop reason: HOSPADM

## 2024-07-12 RX ORDER — PANTOPRAZOLE SODIUM 40 MG/10ML
80 INJECTION, POWDER, LYOPHILIZED, FOR SOLUTION INTRAVENOUS ONCE
Status: COMPLETED | OUTPATIENT
Start: 2024-07-12 | End: 2024-07-12

## 2024-07-12 RX ADMIN — TRAZODONE HYDROCHLORIDE 50 MG: 50 TABLET ORAL at 21:37

## 2024-07-12 RX ADMIN — BUSPIRONE HYDROCHLORIDE 5 MG: 5 TABLET ORAL at 21:38

## 2024-07-12 RX ADMIN — DOXYCYCLINE 100 MG: 100 CAPSULE ORAL at 21:37

## 2024-07-12 RX ADMIN — ATORVASTATIN CALCIUM 10 MG: 10 TABLET, FILM COATED ORAL at 21:37

## 2024-07-12 RX ADMIN — PANTOPRAZOLE SODIUM 80 MG: 40 INJECTION, POWDER, FOR SOLUTION INTRAVENOUS at 18:57

## 2024-07-12 RX ADMIN — HYDROMORPHONE HYDROCHLORIDE 0.25 MG: 1 INJECTION, SOLUTION INTRAMUSCULAR; INTRAVENOUS; SUBCUTANEOUS at 18:47

## 2024-07-12 RX ADMIN — IOPAMIDOL 75 ML: 755 INJECTION, SOLUTION INTRAVENOUS at 18:16

## 2024-07-12 NOTE — ED PROVIDER NOTES
Subjective   History of Present Illness  84-year-old female presents for evaluation of multiple complaints, primarily cough and melena.  Of note, the patient has a complicated recent past medical history.  I did a thorough review of her recent records.  She was admitted to our facility from June 28 through July 3 at which time she was treated for thoracic compression fractures requiring vertebroplasty.  She also tested positive for rhinovirus during her recent hospitalization.  She tells me that since being discharged she has been experiencing a persistent nagging cough.  She saw her primary care physician this past week regarding her persistent cough and was started on doxycycline which she has been taking for the past 4 days; however, she continues to experience a persistent cough.  She denies any fevers or chills.  Additionally, over the past 2 days she has been experiencing significant melena that seems to be gradually worsening with each bowel movement.  She is not anticoagulated.  She denies any abdominal pain.  She does have a history of a AAA.  Given her ongoing symptoms, family brought her here for evaluation today.      Review of Systems   Respiratory:  Positive for cough.    Gastrointestinal:  Positive for blood in stool.   Neurological:  Positive for weakness.   All other systems reviewed and are negative.      Past Medical History:   Diagnosis Date    AAA (abdominal aortic aneurysm)     Arthritis     CHF (congestive heart failure)     Glaucoma     Hypertension     Stroke        No Known Allergies    Past Surgical History:   Procedure Laterality Date    INTERVENTIONAL RADIOLOGY PROCEDURE N/A 7/1/2024    Procedure: IR vertebroplasty thoracic with fluoroscopy, T9 and T10;  Surgeon: Javier Perez MD;  Location: [facility] INVASIVE LOCATION;  Service: Interventional Radiology;  Laterality: N/A;    JOINT REPLACEMENT         No family history on file.    Social History     Socioeconomic History    Marital  status:    Tobacco Use    Smoking status: Former     Current packs/day: 0.00     Average packs/day: 1 pack/day for 61.0 years (61.0 ttl pk-yrs)     Types: Cigarettes     Start date: 1960     Quit date: 2021     Years since quitting: 3.5   Vaping Use    Vaping status: Never Used   Substance and Sexual Activity    Alcohol use: Not Currently    Drug use: Never    Sexual activity: Not Currently           Objective   Physical Exam  Vitals and nursing note reviewed.   Constitutional:       General: She is not in acute distress.     Appearance: She is well-developed. She is not diaphoretic.      Comments: Nontoxic-appearing elderly female   HENT:      Head: Normocephalic and atraumatic.   Eyes:      Pupils: Pupils are equal, round, and reactive to light.   Neck:      Comments: No meningeal signs or nuchal rigidity  Cardiovascular:      Rate and Rhythm: Normal rate and regular rhythm.      Heart sounds: Normal heart sounds. No murmur heard.     No friction rub. No gallop.   Pulmonary:      Effort: Pulmonary effort is normal. No respiratory distress.      Breath sounds: Normal breath sounds. No wheezing or rales.   Abdominal:      General: Bowel sounds are normal. There is no distension.      Palpations: Abdomen is soft. There is no mass.      Tenderness: There is no abdominal tenderness. There is no guarding or rebound.      Comments: No focal abdominal tenderness, no peritoneal signs, no pain out of proportion to exam   Genitourinary:     Comments: No bright red blood per rectum, fecal occult blood test is positive with melena noted on digital rectal exam  Musculoskeletal:         General: Normal range of motion.   Skin:     General: Skin is warm and dry.      Findings: No erythema or rash.   Neurological:      Mental Status: She is alert and oriented to person, place, and time.      Comments: Neurovascularly intact distally in both lower extremities with bounding distal pulses and normal sensation noted    Psychiatric:         Mood and Affect: Mood normal.         Thought Content: Thought content normal.         Judgment: Judgment normal.         Procedures           ED Course  ED Course as of 07/13/24 0153   Fri Jul 12, 2024   1630 84-year-old female presents for evaluation of multiple complaints, primarily cough and melena.  Of note, the patient has a complicated recent past medical history.  She was admitted to our facility from June 28 through July 3 at which time she was treated for thoracic compression fractures requiring vertebroplasty.  She also tested positive for rhinovirus during her hospitalization.  Since going home she has been experiencing a persistent nagging cough.  She saw her primary care physician this past week and was started on doxycycline with which she has been compliant for the past 4 days; however, she continues to experience a cough.  No fevers or chills.  Additionally, over the past 2 days she has been experiencing significant melena.  She is not anticoagulated.  No abdominal pain.  Given her many symptoms, she was brought here to the ED to be evaluated today.  On arrival, the patient is nontoxic-appearing.  Nonsurgical abdomen.  Broad differential diagnosis.  We will obtain labs and imaging, and we will reassess following initial interventions. [DD]   1722 Labs remarkable for white blood cell count of 16,000.  Hemoglobin today is 10, 2 point drop from 10 days ago. [DD]   1739 Fecal occult blood test is positive. [DD]   1825 I personally and independently viewed the patient's x-ray images myself, and I am in agreement with the radiologist's reading for final interpretation, particularly there is no obvious pneumonia noted. [DD]   1849 I personally and independently reviewed the patient's CT images and findings, and I am in agreement with the radiologist regarding CT interpretation--particularly regarding small focus in gastric region concerning for potential source of the patient's  melena.  No aortoenteric fistula noted. [DD]   1850 After reviewing the patient's labs and imaging, I discussed her case with our hospitalist, Dr. Pino, and the patient will be admitted under her care for further evaluation and treatment as well as for gastroenterology consult in the morning.  The patient is hemodynamically stable at this time and is aware/agreeable with the plan. [DD]      ED Course User Index  [DD] Willie Diana MD                                        Recent Results (from the past 24 hour(s))   Urinalysis With Culture If Indicated - Urine, Clean Catch    Collection Time: 07/12/24  4:40 PM    Specimen: Urine, Clean Catch   Result Value Ref Range    Color, UA Yellow Yellow, Straw    Appearance, UA Cloudy (A) Clear    pH, UA 5.5 5.0 - 8.0    Specific Gravity, UA 1.020 1.001 - 1.030    Glucose, UA Negative Negative    Ketones, UA Trace (A) Negative    Bilirubin, UA Negative Negative    Blood, UA Moderate (2+) (A) Negative    Protein,  mg/dL (2+) (A) Negative    Leuk Esterase, UA Moderate (2+) (A) Negative    Nitrite, UA Negative Negative    Urobilinogen, UA 1.0 E.U./dL 0.2 - 1.0 E.U./dL   Urinalysis, Microscopic Only - Urine, Clean Catch    Collection Time: 07/12/24  4:40 PM    Specimen: Urine, Clean Catch   Result Value Ref Range    RBC, UA 0-2 None Seen, 0-2 /HPF    WBC, UA 11-20 (A) None Seen, 0-2 /HPF    Bacteria, UA None Seen None Seen, Trace /HPF    Squamous Epithelial Cells, UA 0-2 None Seen, 0-2 /HPF    Hyaline Casts, UA 0-6 0 - 6 /LPF    Methodology Manual Light Microscopy    Osmolality, Urine - Urine, Clean Catch    Collection Time: 07/12/24  4:40 PM    Specimen: Urine, Clean Catch   Result Value Ref Range    Osmolality, Urine 454 300 - 1,100 mOsm/kg   Sodium, Urine, Random - Urine, Clean Catch    Collection Time: 07/12/24  4:40 PM    Specimen: Urine, Clean Catch   Result Value Ref Range    Sodium, Urine 35 mmol/L   Comprehensive Metabolic Panel    Collection Time:  07/12/24  5:04 PM    Specimen: Blood   Result Value Ref Range    Glucose 131 (H) 65 - 99 mg/dL    BUN 20 8 - 23 mg/dL    Creatinine 1.32 (H) 0.57 - 1.00 mg/dL    Sodium 138 136 - 145 mmol/L    Potassium 3.6 3.5 - 5.2 mmol/L    Chloride 97 (L) 98 - 107 mmol/L    CO2 31.0 (H) 22.0 - 29.0 mmol/L    Calcium 9.1 8.6 - 10.5 mg/dL    Total Protein 7.3 6.0 - 8.5 g/dL    Albumin 3.5 3.5 - 5.2 g/dL    ALT (SGPT) 10 1 - 33 U/L    AST (SGOT) 16 1 - 32 U/L    Alkaline Phosphatase 150 (H) 39 - 117 U/L    Total Bilirubin 0.3 0.0 - 1.2 mg/dL    Globulin 3.8 gm/dL    A/G Ratio 0.9 g/dL    BUN/Creatinine Ratio 15.2 7.0 - 25.0    Anion Gap 10.0 5.0 - 15.0 mmol/L    eGFR 39.4 (L) >60.0 mL/min/1.73   Lactic Acid, Plasma    Collection Time: 07/12/24  5:04 PM    Specimen: Blood   Result Value Ref Range    Lactate 1.5 0.5 - 2.0 mmol/L   Green Top (Gel)    Collection Time: 07/12/24  5:04 PM   Result Value Ref Range    Extra Tube Hold for add-ons.    Lavender Top    Collection Time: 07/12/24  5:04 PM   Result Value Ref Range    Extra Tube hold for add-on    Gold Top - SST    Collection Time: 07/12/24  5:04 PM   Result Value Ref Range    Extra Tube Hold for add-ons.    Gray Top    Collection Time: 07/12/24  5:04 PM   Result Value Ref Range    Extra Tube Hold for add-ons.    Light Blue Top    Collection Time: 07/12/24  5:04 PM   Result Value Ref Range    Extra Tube Hold for add-ons.    CBC Auto Differential    Collection Time: 07/12/24  5:04 PM    Specimen: Blood   Result Value Ref Range    WBC 16.20 (H) 3.40 - 10.80 10*3/mm3    RBC 3.65 (L) 3.77 - 5.28 10*6/mm3    Hemoglobin 10.7 (L) 12.0 - 15.9 g/dL    Hematocrit 33.8 (L) 34.0 - 46.6 %    MCV 92.6 79.0 - 97.0 fL    MCH 29.3 26.6 - 33.0 pg    MCHC 31.7 31.5 - 35.7 g/dL    RDW 12.9 12.3 - 15.4 %    RDW-SD 43.5 37.0 - 54.0 fl    MPV 10.2 6.0 - 12.0 fL    Platelets 457 (H) 140 - 450 10*3/mm3    Neutrophil % 70.8 42.7 - 76.0 %    Lymphocyte % 22.2 19.6 - 45.3 %    Monocyte % 5.2 5.0 - 12.0 %     Eosinophil % 1.0 0.3 - 6.2 %    Basophil % 0.4 0.0 - 1.5 %    Immature Grans % 0.4 0.0 - 0.5 %    Neutrophils, Absolute 11.45 (H) 1.70 - 7.00 10*3/mm3    Lymphocytes, Absolute 3.60 (H) 0.70 - 3.10 10*3/mm3    Monocytes, Absolute 0.84 0.10 - 0.90 10*3/mm3    Eosinophils, Absolute 0.17 0.00 - 0.40 10*3/mm3    Basophils, Absolute 0.07 0.00 - 0.20 10*3/mm3    Immature Grans, Absolute 0.07 (H) 0.00 - 0.05 10*3/mm3    nRBC 0.0 0.0 - 0.2 /100 WBC   Procalcitonin    Collection Time: 07/12/24  5:04 PM    Specimen: Blood   Result Value Ref Range    Procalcitonin 0.07 0.00 - 0.25 ng/mL   D-dimer, Quantitative    Collection Time: 07/12/24  5:04 PM    Specimen: Blood   Result Value Ref Range    D-Dimer, Quantitative 1.11 (H) 0.00 - 0.86 MCGFEU/mL   proBNP    Collection Time: 07/12/24  5:04 PM    Specimen: Blood   Result Value Ref Range    proBNP 970.8 0.0 - 1,800.0 pg/mL   Sedimentation Rate    Collection Time: 07/12/24  5:04 PM    Specimen: Blood   Result Value Ref Range    Sed Rate 73 (H) 0 - 30 mm/hr   C-reactive Protein    Collection Time: 07/12/24  5:04 PM    Specimen: Blood   Result Value Ref Range    C-Reactive Protein 7.63 (H) 0.00 - 0.50 mg/dL   POC Creatinine    Collection Time: 07/12/24  5:15 PM    Specimen: Blood   Result Value Ref Range    Creatinine 1.40 (H) 0.60 - 1.30 mg/dL   POC Occult Blood Stool    Collection Time: 07/12/24  5:35 PM    Specimen: Stool   Result Value Ref Range    Fecal Occult Blood Positive (A)     Lot Number [id number]     Expiration Date 12-26     DEVELOPER LOT NUMBER 0     DEVELOPER EXPIRATION DATE 0     Positive Control Positive     Negative Control Negative    Type & Screen    Collection Time: 07/12/24  5:36 PM    Specimen: Blood   Result Value Ref Range    ABO Type A     RH type Positive     Antibody Screen Negative     T&S Expiration Date 7/15/2024 11:59:59 PM    Respiratory Panel PCR w/COVID-19(SARS-CoV-2) SANCHEZ/REYNALDO/ERLIN/PAD/COR/FELICIA In-House, NP Swab in UTM/VTM, 2 HR TAT - Swab, Nasopharynx     Collection Time: 07/12/24  5:38 PM    Specimen: Nasopharynx; Swab   Result Value Ref Range    ADENOVIRUS, PCR Not Detected Not Detected    Coronavirus 229E Not Detected Not Detected    Coronavirus HKU1 Not Detected Not Detected    Coronavirus NL63 Not Detected Not Detected    Coronavirus OC43 Not Detected Not Detected    COVID19 Not Detected Not Detected - Ref. Range    Human Metapneumovirus Not Detected Not Detected    Human Rhinovirus/Enterovirus Not Detected Not Detected    Influenza A PCR Not Detected Not Detected    Influenza B PCR Not Detected Not Detected    Parainfluenza Virus 1 Not Detected Not Detected    Parainfluenza Virus 2 Not Detected Not Detected    Parainfluenza Virus 3 Not Detected Not Detected    Parainfluenza Virus 4 Not Detected Not Detected    RSV, PCR Not Detected Not Detected    Bordetella pertussis pcr Not Detected Not Detected    Bordetella parapertussis PCR Not Detected Not Detected    Chlamydophila pneumoniae PCR Not Detected Not Detected    Mycoplasma pneumo by PCR Not Detected Not Detected   ABO RH Specimen Verification    Collection Time: 07/12/24  6:53 PM    Specimen: Blood   Result Value Ref Range    ABO Type A     RH type Positive    Hemoglobin & Hematocrit, Blood    Collection Time: 07/12/24 11:18 PM    Specimen: Blood   Result Value Ref Range    Hemoglobin 9.6 (L) 12.0 - 15.9 g/dL    Hematocrit 30.1 (L) 34.0 - 46.6 %     Note: In addition to lab results from this visit, the labs listed above may include labs taken at another facility or during a different encounter within the last 24 hours. Please correlate lab times with ED admission and discharge times for further clarification of the services performed during this visit.    CT Abdomen Pelvis With & Without Contrast   Final Result   1. Small focus of enhancement involving the anterior gastric wall may represent the origin of the patient's GI bleed.   2. 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 There is a 3.1 cm aneurysm of the infrarenal abdominal aorta.   3. Significantly diminished enhancement of the right kidney with severe atheromatous disease at the origin of the right renal artery. Right renal atrophy. 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               Electronically Signed: Roly Garcia MD     7/12/2024 6:46 PM EDT     Workstation ID: [id number]      XR Chest 1 View   Final Result   There is no significant change when compared to the prior study. There is no evidence for acute cardiopulmonary process.            Electronically Signed: Murphy Hooker MD     7/12/2024 6:21 PM EDT     Workstation ID: [id number]      NM Lung Scan Perfusion Particulate    (Results Pending)     Vitals:    07/12/24 1936 07/12/24 1937 07/1938 07/12/24 1939   BP:       BP Location:       Patient Position:       Pulse: 82 81 73 85   Resp:       Temp:       TempSrc:       SpO2: 95% 93% 93% 94%   Weight:       Height:         Medications   sodium chloride 0.9 % flush 10 mL (has no administration in time range)   atorvastatin (LIPITOR) tablet 10 mg (10 mg Oral Given 7/12/24 2137)   brimonidine (ALPHAGAN) 0.2 % ophthalmic solution 1 drop (has no administration in time range)   busPIRone (BUSPAR) tablet 5 mg (5 mg Oral Given 7/12/24 2138)   dorzolamide (TRUSOPT) 2 % 1 drop, timolol (TIMOPTIC) 0.5 % 1 drop for Cosopt 22.3-6.8 mg/mL (has no administration in time range)   ferrous sulfate tablet 325 mg (has no administration in time range)   latanoprost (XALATAN) 0.005 % ophthalmic solution 1 drop (has no administration in time range)   traZODone (DESYREL) tablet 50 mg (50 mg Oral Given 7/12/24 2137)   sodium chloride 0.9 % flush 10 mL (10 mL Intravenous Not Given 7/12/24 2143)   sodium chloride 0.9 % flush 10 mL (has no administration in time range)   sodium chloride 0.9 % infusion 40 mL (has no administration in  time range)   ipratropium-albuterol (DUO-NEB) nebulizer solution 3 mL (has no administration in time range)   ipratropium-albuterol (DUO-NEB) nebulizer solution 3 mL (has no administration in time range)   sodium chloride 0.9 % infusion (75 mL/hr Intravenous Not Given 7/12/24 2138)   melatonin tablet 5 mg (has no administration in time range)   pantoprazole (PROTONIX) injection 40 mg (has no administration in time range)   oxyCODONE (ROXICODONE) immediate release tablet 5 mg (has no administration in time range)   doxycycline (MONODOX) capsule 100 mg (has no administration in time range)   iopamidol (ISOVUE-370) 76 % injection 100 mL (75 mL Intravenous Given 7/12/24 1816)   HYDROmorphone (DILAUDID) injection 0.25 mg (0.25 mg Intravenous Given 7/12/24 1847)   pantoprazole (PROTONIX) injection 80 mg (80 mg Intravenous Given 7/12/24 1857)     ECG/EMG Results (last 24 hours)       ** No results found for the last 24 hours. **          ECG 12 Lead QT Measurement    (Results Pending)              Medical Decision Making  Amount and/or Complexity of Data Reviewed  Labs: ordered.  Radiology: ordered.    Risk  Prescription drug management.  Decision regarding hospitalization.        Final diagnoses:   Melena   Anemia, unspecified type   Leukocytosis, unspecified type       ED Disposition  ED Disposition       ED Disposition   Decision to Admit    Condition   --    Comment   Level of Care: Telemetry [5]   Diagnosis: Melena [[id number]]                 No follow-up provider specified.       Medication List      No changes were made to your prescriptions during this visit.            Willie Diana MD  07/13/24 0154

## 2024-07-12 NOTE — ED NOTES
" Mariia Roberson    Nursing Report ED to Floor:  Mental status: AXO X4  Ambulatory status: ASSIST X 1  Oxygen Therapy:  RA  Cardiac Rhythm: NSR  Admitted from: HOME  Safety Concerns:  FALL   Social Issues: NONE  ED Room #:  23    ED Nurse Phone Extension - [phone] or may call [phone].      HPI:   Chief Complaint   Patient presents with    GI Problem       Past Medical History:  Past Medical History:   Diagnosis Date    AAA (abdominal aortic aneurysm)     Arthritis     CHF (congestive heart failure)     Glaucoma     Hypertension     Stroke         Past Surgical History:  Past Surgical History:   Procedure Laterality Date    INTERVENTIONAL RADIOLOGY PROCEDURE N/A 7/1/2024    Procedure: IR vertebroplasty thoracic with fluoroscopy, T9 and T10;  Surgeon: Javier Perez MD;  Location: [facility] CATH INVASIVE LOCATION;  Service: Interventional Radiology;  Laterality: N/A;    JOINT REPLACEMENT          Admitting Doctor:   No admitting provider for patient encounter.    Consulting Provider(s):  Consults       Date and Time Order Name Status Description    6/28/2024  3:18 PM Inpatient Neurosurgery Consult Completed              Admitting Diagnosis:   There were no encounter diagnoses.    Most Recent Vitals:   Vitals:    07/12/24 1619   BP: 104/90   BP Location: Right arm   Patient Position: Sitting   Pulse: 100   Resp: 18   Temp: 98.8 °F (37.1 °C)   TempSrc: Oral   SpO2: 94%   Weight: 39.9 kg (88 lb)   Height: 152.4 cm (60\")       Active LDAs/IV Access:   Lines, Drains & Airways       Active LDAs       Name Placement date Placement time Site Days    Peripheral IV 07/12/24 1736 Right Antecubital 07/12/24 1736  Antecubital  less than 1                    Labs (abnormal labs have a star):   Labs Reviewed   COMPREHENSIVE METABOLIC PANEL - Abnormal; Notable for the following components:       Result Value    Glucose 131 (*)     Creatinine 1.32 (*)     Chloride 97 (*)     CO2 31.0 (*)     Alkaline Phosphatase 150 (*)     eGFR 39.4 (*)  "    All other components within normal limits    Narrative:     GFR Normal >60  Chronic Kidney Disease <60  Kidney Failure <15    The GFR formula is only valid for adults with stable renal function between ages 18 and 70.   CBC WITH AUTO DIFFERENTIAL - Abnormal; Notable for the following components:    WBC 16.20 (*)     RBC 3.65 (*)     Hemoglobin 10.7 (*)     Hematocrit 33.8 (*)     Platelets 457 (*)     Neutrophils, Absolute 11.45 (*)     Lymphocytes, Absolute 3.60 (*)     Immature Grans, Absolute 0.07 (*)     All other components within normal limits   URINALYSIS W/ CULTURE IF INDICATED - Abnormal; Notable for the following components:    Appearance, UA Cloudy (*)     Ketones, UA Trace (*)     Blood, UA Moderate (2+) (*)     Protein,  mg/dL (2+) (*)     Leuk Esterase, UA Moderate (2+) (*)     All other components within normal limits    Narrative:     In absence of clinical symptoms, the presence of pyuria, bacteria, and/or nitrites on the urinalysis result does not correlate with infection.   URINALYSIS, MICROSCOPIC ONLY - Abnormal; Notable for the following components:    WBC, UA 11-20 (*)     All other components within normal limits   POCT OCCULT BLOOD STOOL (ED ONLY) - Abnormal; Notable for the following components:    Fecal Occult Blood Positive (*)     All other components within normal limits   POCT CREATININE - Abnormal; Notable for the following components:    Creatinine 1.40 (*)     All other components within normal limits   LACTIC ACID, PLASMA - Normal   PROCALCITONIN - Normal    Narrative:     As a Marker for Sepsis (Non-Neonates):    1. <0.5 ng/mL represents a low risk of severe sepsis and/or septic shock.  2. >2 ng/mL represents a high risk of severe sepsis and/or septic shock.    As a Marker for Lower Respiratory Tract Infections that require antibiotic therapy:    PCT on Admission    Antibiotic Therapy       6-12 Hrs later    >0.5                Strongly Recommended  >0.25 - <0.5         "Recommended   0.1 - 0.25          Discouraged              Remeasure/reassess PCT  <0.1                Strongly Discouraged     Remeasure/reassess PCT    As 28 day mortality risk marker: \"Change in Procalcitonin Result\" (>80% or <=80%) if Day 0 (or Day 1) and Day 4 values are available. Refer to http://www.[facility] [place]-pct-calculator.com    Change in PCT <=80%  A decrease of PCT levels below or equal to 80% defines a positive change in PCT test result representing a higher risk for 28-day all-cause mortality of patients diagnosed with severe sepsis for septic shock.    Change in PCT >80%  A decrease of PCT levels of more than 80% defines a negative change in PCT result representing a lower risk for 28-day all-cause mortality of patients diagnosed with severe sepsis or septic shock.      RESPIRATORY PANEL PCR W/ COVID-19 (SARS-COV-2), NP SWAB IN UTM/VTP, 2 HR TAT   BLOOD CULTURE   BLOOD CULTURE   URINE CULTURE   RAINBOW DRAW    Narrative:     The following orders were created for panel order [place] Draw.  Procedure                               Abnormality         Status                     ---------                               -----------         ------                     Green Top (Gel)[[id number]]                                  Final result               Lavender Top[[id number]]                                     Final result               Gold Top - SST[[id number]]                                   Final result               Vill[place]ran Top[[id number]]                                         Final result               Light Blue Top[[id number]]                                   Final result                 Please view results for these tests on the individual orders.   TYPE AND SCREEN   ABORH 2ND SPECIMEN VERIFICATION   CBC AND DIFFERENTIAL    Narrative:     The following orders were created for panel order CBC & Differential.  Procedure                               Abnormality         Status                     ---------  "                              -----------         ------                     CBC Auto Differential[[id number]]        Abnormal            Final result                 Please view results for these tests on the individual orders.   GREEN TOP   LAVENDER TOP   GOLD TOP - SST   GRAY TOP   LIGHT BLUE TOP       Meds Given in ED:   Medications   sodium chloride 0.9 % flush 10 mL (has no administration in time range)   iopamidol (ISOVUE-370) 76 % injection 100 mL (75 mL Intravenous Given 7/12/24 1816)   HYDROmorphone (DILAUDID) injection 0.25 mg (0.25 mg Intravenous Given 7/12/24 1847)   pantoprazole (PROTONIX) injection 80 mg (80 mg Intravenous Given 7/12/24 1857)           Last NIH score:                                                          Dysphagia screening results:        Hemanth Coma Scale:  No data recorded     CIWA:        Restraint Type:            Isolation Status:  No active isolations

## 2024-07-13 ENCOUNTER — ANESTHESIA EVENT (OUTPATIENT)
Dept: GASTROENTEROLOGY | Facility: HOSPITAL | Age: 84
End: 2024-07-13
Payer: MEDICARE

## 2024-07-13 ENCOUNTER — ANESTHESIA (OUTPATIENT)
Dept: GASTROENTEROLOGY | Facility: HOSPITAL | Age: 84
End: 2024-07-13
Payer: MEDICARE

## 2024-07-13 LAB
ANION GAP SERPL CALCULATED.3IONS-SCNC: 12 MMOL/L (ref 5–15)
ANION GAP SERPL CALCULATED.3IONS-SCNC: 13 MMOL/L (ref 5–15)
APTT PPP: 32.6 SECONDS (ref 22–39)
BASOPHILS # BLD AUTO: 0.06 10*3/MM3 (ref 0–0.2)
BASOPHILS NFR BLD AUTO: 0.4 % (ref 0–1.5)
BUN SERPL-MCNC: 18 MG/DL (ref 8–23)
BUN SERPL-MCNC: 21 MG/DL (ref 8–23)
BUN/CREAT SERPL: 17.8 (ref 7–25)
BUN/CREAT SERPL: 19.3 (ref 7–25)
CALCIUM SPEC-SCNC: 8.4 MG/DL (ref 8.6–10.5)
CALCIUM SPEC-SCNC: 8.5 MG/DL (ref 8.6–10.5)
CHLORIDE SERPL-SCNC: 94 MMOL/L (ref 98–107)
CHLORIDE SERPL-SCNC: 98 MMOL/L (ref 98–107)
CO2 SERPL-SCNC: 25 MMOL/L (ref 22–29)
CO2 SERPL-SCNC: 28 MMOL/L (ref 22–29)
CREAT SERPL-MCNC: 1.01 MG/DL (ref 0.57–1)
CREAT SERPL-MCNC: 1.09 MG/DL (ref 0.57–1)
CREAT UR-MCNC: 94.1 MG/DL
DEPRECATED RDW RBC AUTO: 42.5 FL (ref 37–54)
EGFRCR SERPLBLD CKD-EPI 2021: 49.6 ML/MIN/1.73
EGFRCR SERPLBLD CKD-EPI 2021: 54.3 ML/MIN/1.73
EOSINOPHIL # BLD AUTO: 0.12 10*3/MM3 (ref 0–0.4)
EOSINOPHIL NFR BLD AUTO: 0.9 % (ref 0.3–6.2)
ERYTHROCYTE [DISTWIDTH] IN BLOOD BY AUTOMATED COUNT: 13 % (ref 12.3–15.4)
GLUCOSE SERPL-MCNC: 114 MG/DL (ref 65–99)
GLUCOSE SERPL-MCNC: 123 MG/DL (ref 65–99)
HCT VFR BLD AUTO: 29.8 % (ref 34–46.6)
HCT VFR BLD AUTO: 30.5 % (ref 34–46.6)
HCT VFR BLD AUTO: 31.7 % (ref 34–46.6)
HCT VFR BLD AUTO: 31.7 % (ref 34–46.6)
HCT VFR BLD AUTO: 35.2 % (ref 34–46.6)
HGB BLD-MCNC: 10.2 G/DL (ref 12–15.9)
HGB BLD-MCNC: 10.2 G/DL (ref 12–15.9)
HGB BLD-MCNC: 11.1 G/DL (ref 12–15.9)
HGB BLD-MCNC: 9.4 G/DL (ref 12–15.9)
HGB BLD-MCNC: 9.5 G/DL (ref 12–15.9)
IMM GRANULOCYTES # BLD AUTO: 0.07 10*3/MM3 (ref 0–0.05)
IMM GRANULOCYTES NFR BLD AUTO: 0.5 % (ref 0–0.5)
INR PPP: 1.08 (ref 0.89–1.12)
LYMPHOCYTES # BLD AUTO: 2.9 10*3/MM3 (ref 0.7–3.1)
LYMPHOCYTES NFR BLD AUTO: 21.1 % (ref 19.6–45.3)
MAGNESIUM SERPL-MCNC: 1.6 MG/DL (ref 1.6–2.4)
MCH RBC QN AUTO: 28.8 PG (ref 26.6–33)
MCHC RBC AUTO-ENTMCNC: 32.2 G/DL (ref 31.5–35.7)
MCV RBC AUTO: 89.5 FL (ref 79–97)
MONOCYTES # BLD AUTO: 0.74 10*3/MM3 (ref 0.1–0.9)
MONOCYTES NFR BLD AUTO: 5.4 % (ref 5–12)
NEUTROPHILS NFR BLD AUTO: 71.7 % (ref 42.7–76)
NEUTROPHILS NFR BLD AUTO: 9.83 10*3/MM3 (ref 1.7–7)
NRBC BLD AUTO-RTO: 0 /100 WBC (ref 0–0.2)
PLATELET # BLD AUTO: 388 10*3/MM3 (ref 140–450)
PMV BLD AUTO: 10 FL (ref 6–12)
POTASSIUM SERPL-SCNC: 3.6 MMOL/L (ref 3.5–5.2)
POTASSIUM SERPL-SCNC: 4 MMOL/L (ref 3.5–5.2)
PROTHROMBIN TIME: 14.1 SECONDS (ref 12.2–14.5)
QT INTERVAL: 372 MS
QTC INTERVAL: 442 MS
RBC # BLD AUTO: 3.54 10*6/MM3 (ref 3.77–5.28)
SODIUM SERPL-SCNC: 134 MMOL/L (ref 136–145)
SODIUM SERPL-SCNC: 136 MMOL/L (ref 136–145)
WBC NRBC COR # BLD AUTO: 13.72 10*3/MM3 (ref 3.4–10.8)

## 2024-07-13 PROCEDURE — 83735 ASSAY OF MAGNESIUM: CPT | Performed by: NURSE PRACTITIONER

## 2024-07-13 PROCEDURE — 93010 ELECTROCARDIOGRAM REPORT: CPT | Performed by: INTERNAL MEDICINE

## 2024-07-13 PROCEDURE — 80048 BASIC METABOLIC PNL TOTAL CA: CPT | Performed by: NURSE PRACTITIONER

## 2024-07-13 PROCEDURE — 94761 N-INVAS EAR/PLS OXIMETRY MLT: CPT

## 2024-07-13 PROCEDURE — 80048 BASIC METABOLIC PNL TOTAL CA: CPT | Performed by: FAMILY MEDICINE

## 2024-07-13 PROCEDURE — 25810000003 LACTATED RINGERS PER 1000 ML: Performed by: NURSE ANESTHETIST, CERTIFIED REGISTERED

## 2024-07-13 PROCEDURE — 85610 PROTHROMBIN TIME: CPT | Performed by: NURSE PRACTITIONER

## 2024-07-13 PROCEDURE — 85014 HEMATOCRIT: CPT | Performed by: NURSE PRACTITIONER

## 2024-07-13 PROCEDURE — 85014 HEMATOCRIT: CPT | Performed by: FAMILY MEDICINE

## 2024-07-13 PROCEDURE — 94664 DEMO&/EVAL PT USE INHALER: CPT

## 2024-07-13 PROCEDURE — 97165 OT EVAL LOW COMPLEX 30 MIN: CPT

## 2024-07-13 PROCEDURE — 94640 AIRWAY INHALATION TREATMENT: CPT

## 2024-07-13 PROCEDURE — 25810000003 SODIUM CHLORIDE 0.9 % SOLUTION: Performed by: FAMILY MEDICINE

## 2024-07-13 PROCEDURE — 85018 HEMOGLOBIN: CPT | Performed by: NURSE PRACTITIONER

## 2024-07-13 PROCEDURE — 97161 PT EVAL LOW COMPLEX 20 MIN: CPT | Performed by: PHYSICAL THERAPIST

## 2024-07-13 PROCEDURE — 94799 UNLISTED PULMONARY SVC/PX: CPT

## 2024-07-13 PROCEDURE — 25010000002 LABETALOL 5 MG/ML SOLUTION: Performed by: NURSE ANESTHETIST, CERTIFIED REGISTERED

## 2024-07-13 PROCEDURE — 85018 HEMOGLOBIN: CPT | Performed by: FAMILY MEDICINE

## 2024-07-13 PROCEDURE — 43235 EGD DIAGNOSTIC BRUSH WASH: CPT | Performed by: INTERNAL MEDICINE

## 2024-07-13 PROCEDURE — 85025 COMPLETE CBC W/AUTO DIFF WBC: CPT | Performed by: NURSE PRACTITIONER

## 2024-07-13 PROCEDURE — 99232 SBSQ HOSP IP/OBS MODERATE 35: CPT | Performed by: FAMILY MEDICINE

## 2024-07-13 PROCEDURE — 85014 HEMATOCRIT: CPT | Performed by: INTERNAL MEDICINE

## 2024-07-13 PROCEDURE — 85018 HEMOGLOBIN: CPT | Performed by: INTERNAL MEDICINE

## 2024-07-13 PROCEDURE — 25010000002 PROPOFOL 10 MG/ML EMULSION: Performed by: NURSE ANESTHETIST, CERTIFIED REGISTERED

## 2024-07-13 PROCEDURE — 85730 THROMBOPLASTIN TIME PARTIAL: CPT | Performed by: NURSE PRACTITIONER

## 2024-07-13 PROCEDURE — 99223 1ST HOSP IP/OBS HIGH 75: CPT | Performed by: NURSE PRACTITIONER

## 2024-07-13 PROCEDURE — 0DJ08ZZ INSPECTION OF UPPER INTESTINAL TRACT, VIA NATURAL OR ARTIFICIAL OPENING ENDOSCOPIC: ICD-10-PCS | Performed by: INTERNAL MEDICINE

## 2024-07-13 RX ORDER — IPRATROPIUM BROMIDE AND ALBUTEROL SULFATE 2.5; .5 MG/3ML; MG/3ML
3 SOLUTION RESPIRATORY (INHALATION) ONCE AS NEEDED
Status: DISCONTINUED | OUTPATIENT
Start: 2024-07-13 | End: 2024-07-13 | Stop reason: HOSPADM

## 2024-07-13 RX ORDER — PANTOPRAZOLE SODIUM 40 MG/1
40 TABLET, DELAYED RELEASE ORAL
Status: DISCONTINUED | OUTPATIENT
Start: 2024-07-13 | End: 2024-07-13

## 2024-07-13 RX ORDER — METOPROLOL TARTRATE 1 MG/ML
2.5 INJECTION, SOLUTION INTRAVENOUS ONCE
Status: COMPLETED | OUTPATIENT
Start: 2024-07-13 | End: 2024-07-13

## 2024-07-13 RX ORDER — PROPOFOL 10 MG/ML
VIAL (ML) INTRAVENOUS AS NEEDED
Status: DISCONTINUED | OUTPATIENT
Start: 2024-07-13 | End: 2024-07-13 | Stop reason: SURG

## 2024-07-13 RX ORDER — ACETAMINOPHEN 500 MG
1000 TABLET ORAL 3 TIMES DAILY
Status: DISCONTINUED | OUTPATIENT
Start: 2024-07-13 | End: 2024-07-15 | Stop reason: HOSPADM

## 2024-07-13 RX ORDER — SODIUM CHLORIDE, SODIUM LACTATE, POTASSIUM CHLORIDE, CALCIUM CHLORIDE 600; 310; 30; 20 MG/100ML; MG/100ML; MG/100ML; MG/100ML
INJECTION, SOLUTION INTRAVENOUS CONTINUOUS PRN
Status: DISCONTINUED | OUTPATIENT
Start: 2024-07-13 | End: 2024-07-13 | Stop reason: SURG

## 2024-07-13 RX ORDER — DROPERIDOL 2.5 MG/ML
0.62 INJECTION, SOLUTION INTRAMUSCULAR; INTRAVENOUS ONCE AS NEEDED
Status: DISCONTINUED | OUTPATIENT
Start: 2024-07-13 | End: 2024-07-13 | Stop reason: HOSPADM

## 2024-07-13 RX ORDER — FENTANYL CITRATE 50 UG/ML
50 INJECTION, SOLUTION INTRAMUSCULAR; INTRAVENOUS
Status: DISCONTINUED | OUTPATIENT
Start: 2024-07-13 | End: 2024-07-13 | Stop reason: HOSPADM

## 2024-07-13 RX ORDER — LABETALOL HYDROCHLORIDE 5 MG/ML
INJECTION, SOLUTION INTRAVENOUS AS NEEDED
Status: DISCONTINUED | OUTPATIENT
Start: 2024-07-13 | End: 2024-07-13 | Stop reason: SURG

## 2024-07-13 RX ORDER — PANTOPRAZOLE SODIUM 40 MG/1
40 TABLET, DELAYED RELEASE ORAL
Status: DISCONTINUED | OUTPATIENT
Start: 2024-07-14 | End: 2024-07-15

## 2024-07-13 RX ORDER — SODIUM CHLORIDE 9 MG/ML
50 INJECTION, SOLUTION INTRAVENOUS CONTINUOUS
Status: DISCONTINUED | OUTPATIENT
Start: 2024-07-13 | End: 2024-07-15

## 2024-07-13 RX ORDER — DORZOLAMIDE HYDROCHLORIDE AND TIMOLOL MALEATE 20; 5 MG/ML; MG/ML
1 SOLUTION/ DROPS OPHTHALMIC 2 TIMES DAILY
Status: DISCONTINUED | OUTPATIENT
Start: 2024-07-13 | End: 2024-07-15 | Stop reason: HOSPADM

## 2024-07-13 RX ORDER — LIDOCAINE HYDROCHLORIDE 10 MG/ML
INJECTION, SOLUTION EPIDURAL; INFILTRATION; INTRACAUDAL; PERINEURAL AS NEEDED
Status: DISCONTINUED | OUTPATIENT
Start: 2024-07-13 | End: 2024-07-13 | Stop reason: SURG

## 2024-07-13 RX ORDER — DOXYCYCLINE 100 MG/1
100 CAPSULE ORAL 2 TIMES DAILY
Status: DISCONTINUED | OUTPATIENT
Start: 2024-07-13 | End: 2024-07-15 | Stop reason: HOSPADM

## 2024-07-13 RX ORDER — LIDOCAINE 4 G/G
2 PATCH TOPICAL
Status: DISCONTINUED | OUTPATIENT
Start: 2024-07-13 | End: 2024-07-15 | Stop reason: HOSPADM

## 2024-07-13 RX ORDER — SODIUM CHLORIDE 9 MG/ML
50 INJECTION, SOLUTION INTRAVENOUS CONTINUOUS
Status: DISCONTINUED | OUTPATIENT
Start: 2024-07-13 | End: 2024-07-13

## 2024-07-13 RX ADMIN — PANTOPRAZOLE SODIUM 40 MG: 40 INJECTION, POWDER, FOR SOLUTION INTRAVENOUS at 05:24

## 2024-07-13 RX ADMIN — PROPOFOL 50 MG: 10 INJECTION, EMULSION INTRAVENOUS at 08:56

## 2024-07-13 RX ADMIN — IPRATROPIUM BROMIDE AND ALBUTEROL SULFATE 3 ML: 2.5; .5 SOLUTION RESPIRATORY (INHALATION) at 13:28

## 2024-07-13 RX ADMIN — IPRATROPIUM BROMIDE AND ALBUTEROL SULFATE 3 ML: 2.5; .5 SOLUTION RESPIRATORY (INHALATION) at 20:32

## 2024-07-13 RX ADMIN — OXYCODONE HYDROCHLORIDE 5 MG: 5 TABLET ORAL at 03:20

## 2024-07-13 RX ADMIN — Medication 5 MG: at 22:05

## 2024-07-13 RX ADMIN — IPRATROPIUM BROMIDE AND ALBUTEROL SULFATE 3 ML: 2.5; .5 SOLUTION RESPIRATORY (INHALATION) at 15:51

## 2024-07-13 RX ADMIN — LATANOPROST 1 DROP: 50 SOLUTION OPHTHALMIC at 22:07

## 2024-07-13 RX ADMIN — SODIUM CHLORIDE 50 ML/HR: 9 INJECTION, SOLUTION INTRAVENOUS at 16:36

## 2024-07-13 RX ADMIN — METOPROLOL TARTRATE 2.5 MG: 5 INJECTION INTRAVENOUS at 04:04

## 2024-07-13 RX ADMIN — DORZOLAMIDE HYDROCHLORIDE AND TIMOLOL MALEATE 1 DROP: 20; 5 SOLUTION/ DROPS OPHTHALMIC at 13:14

## 2024-07-13 RX ADMIN — LIDOCAINE 2 PATCH: 4 PATCH TOPICAL at 22:05

## 2024-07-13 RX ADMIN — DORZOLAMIDE HYDROCHLORIDE AND TIMOLOL MALEATE 1 DROP: 20; 5 SOLUTION/ DROPS OPHTHALMIC at 22:06

## 2024-07-13 RX ADMIN — OXYCODONE HYDROCHLORIDE 5 MG: 5 TABLET ORAL at 13:14

## 2024-07-13 RX ADMIN — FERROUS SULFATE TAB 325 MG (65 MG ELEMENTAL FE) 325 MG: 325 (65 FE) TAB at 11:23

## 2024-07-13 RX ADMIN — BUSPIRONE HYDROCHLORIDE 5 MG: 5 TABLET ORAL at 22:05

## 2024-07-13 RX ADMIN — ATORVASTATIN CALCIUM 10 MG: 10 TABLET, FILM COATED ORAL at 22:05

## 2024-07-13 RX ADMIN — OXYCODONE HYDROCHLORIDE 5 MG: 5 TABLET ORAL at 22:05

## 2024-07-13 RX ADMIN — DOXYCYCLINE 100 MG: 100 CAPSULE ORAL at 11:23

## 2024-07-13 RX ADMIN — Medication 10 ML: at 22:07

## 2024-07-13 RX ADMIN — BRIMONIDINE TARTRATE 1 DROP: 2 SOLUTION/ DROPS OPHTHALMIC at 22:06

## 2024-07-13 RX ADMIN — LIDOCAINE HYDROCHLORIDE 20 MG: 10 INJECTION, SOLUTION EPIDURAL; INFILTRATION; INTRACAUDAL; PERINEURAL at 08:58

## 2024-07-13 RX ADMIN — ACETAMINOPHEN 1000 MG: 500 TABLET ORAL at 22:05

## 2024-07-13 RX ADMIN — IPRATROPIUM BROMIDE AND ALBUTEROL SULFATE 3 ML: 2.5; .5 SOLUTION RESPIRATORY (INHALATION) at 08:25

## 2024-07-13 RX ADMIN — LABETALOL HYDROCHLORIDE 2.5 MG: 5 INJECTION, SOLUTION INTRAVENOUS at 09:01

## 2024-07-13 RX ADMIN — BUSPIRONE HYDROCHLORIDE 5 MG: 5 TABLET ORAL at 11:23

## 2024-07-13 RX ADMIN — SODIUM CHLORIDE 50 ML/HR: 9 INJECTION, SOLUTION INTRAVENOUS at 16:13

## 2024-07-13 RX ADMIN — BRIMONIDINE TARTRATE 1 DROP: 2 SOLUTION/ DROPS OPHTHALMIC at 13:14

## 2024-07-13 RX ADMIN — DOXYCYCLINE 100 MG: 100 CAPSULE ORAL at 22:05

## 2024-07-13 RX ADMIN — TRAZODONE HYDROCHLORIDE 50 MG: 50 TABLET ORAL at 22:05

## 2024-07-13 RX ADMIN — Medication 10 ML: at 11:23

## 2024-07-13 RX ADMIN — SODIUM CHLORIDE, POTASSIUM CHLORIDE, SODIUM LACTATE AND CALCIUM CHLORIDE: 600; 310; 30; 20 INJECTION, SOLUTION INTRAVENOUS at 08:53

## 2024-07-13 RX ADMIN — LIDOCAINE HYDROCHLORIDE 60 MG: 10 INJECTION, SOLUTION EPIDURAL; INFILTRATION; INTRACAUDAL; PERINEURAL at 08:56

## 2024-07-13 RX ADMIN — PROPOFOL 20 MG: 10 INJECTION, EMULSION INTRAVENOUS at 08:58

## 2024-07-13 NOTE — PLAN OF CARE
Goal Outcome Evaluation:  Plan of Care Reviewed With: patient           Outcome Evaluation: Pt presents below baseline with self care/mobility d/t pain, weakness and decr activity tolerance.  Pt max A LBD, CGA toilet transfer,  CGA to ambulate in hallway with RW.  OT will follow to advance pt toward PLOF.  Recommend home with assist and HHOT.      Anticipated Discharge Disposition (OT): home with assist, home with home health

## 2024-07-13 NOTE — OUTREACH NOTE
Medical Week 2 Survey      Flowsheet Row Responses   [facility] patient discharged from? [facility]   Does the patient have one of the following disease processes/diagnoses(primary or secondary)? Other   Week 2 attempt successful? No   Unsuccessful attempts Attempt 1   Revoke Readmitted            Jeanna MCKEE - Registered Nurse

## 2024-07-13 NOTE — BRIEF OP NOTE
ESOPHAGOGASTRODUODENOSCOPY  Progress Note    Mariia Karol  7/13/2024    EGD is normal.    >> Change PPI to once daily, oral.  >> Begin diet    Mark I. Brunner, MD     Date: 7/13/2024  Time: 09:13 EDT

## 2024-07-13 NOTE — ANESTHESIA POSTPROCEDURE EVALUATION
Patient: Mariia Roberson    Procedure Summary       Date: 07/13/24 Room / Location:  [facility]    Anesthesia Start: 0853 Anesthesia Stop: 0909    Procedure: ESOPHAGOGASTRODUODENOSCOPY Diagnosis:     Surgeons: Brunner, Mark I, MD Provider: Isra Holman MD    Anesthesia Type: Not recorded ASA Status: Not recorded            Anesthesia Type: No value filed.    Vitals  Vitals Value Taken Time   BP     Temp     Pulse 73 07/13/24 0909   Resp     SpO2 98 % 07/13/24 0909   Vitals shown include unfiled device data.        Post Anesthesia Care and Evaluation    Patient location during evaluation: PACU  Patient participation: complete - patient participated  Level of consciousness: awake  Pain score: 0  Pain management: adequate    Airway patency: patent  Anesthetic complications: No anesthetic complications  PONV Status: none  Cardiovascular status: acceptable and stable  Respiratory status: nasal cannula, unassisted, acceptable and spontaneous ventilation  Hydration status: acceptable

## 2024-07-13 NOTE — THERAPY EVALUATION
Patient Name: Mariia Roberson  : 1940    MRN: [id number]                              Today's Date: 2024       Admit Date: 2024    Visit Dx:     ICD-10-CM ICD-9-CM   1. Melena  K92.1 578.1   2. Anemia, unspecified type  D64.9 285.9   3. 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    Severe malnutrition    Melena    Pyuria    Leukocytosis    GIB (gastrointestinal bleeding)    Acute blood loss anemia    Cough    AAA (abdominal aortic aneurysm)    Previous back surgery    CHF (congestive heart failure)    Acute kidney injury    Anxiety     Past Medical History:   Diagnosis Date    AAA (abdominal aortic aneurysm)     Arthritis     CHF (congestive heart failure)     Glaucoma     Hypertension     Stroke      Past Surgical History:   Procedure Laterality Date    INTERVENTIONAL RADIOLOGY PROCEDURE N/A 2024    Procedure: IR vertebroplasty thoracic with fluoroscopy, T9 and T10;  Surgeon: Javier Perez MD;  Location: [facility] INVASIVE LOCATION;  Service: Interventional Radiology;  Laterality: N/A;    JOINT REPLACEMENT      VERTEBROPLASTY  2024      General Information       Row Name 24 1515          Physical Therapy Time and Intention    Document Type evaluation  -LM     Mode of Treatment physical therapy  -       Row Name 24 1515          General Information    Patient Profile Reviewed yes  -LM     Prior Level of Function independent:;all household mobility;gait;feeding;grooming;dressing;min assist:;bathing  Uses rollator for ambulation;  Granddaughter assist with shower transfer, but pt able to bathe herself  -LM     Existing Precautions/Restrictions fall;spinal;other (see comments)  Recnt T9 & T11 kyphoplasties - 24;  R eye blindness  -LM      Barriers to Rehab medically complex;visual deficit  -LM       Row Name 07/13/24 1515          Living Environment    People in Home child(lionel), adult  Dtr and son in law  -LM       Row Name 07/13/24 1515          Home Main Entrance    Number of Stairs, Main Entrance one  -LM     Stair Railings, Main Entrance none  -LM       Row Name 07/13/24 1515          Stairs Within Home, Primary    Number of Stairs, Within Home, Primary none  -LM       Row Name 07/13/24 1515          Cognition    Orientation Status (Cognition) oriented to;person;place;verbal cues/prompts needed for orientation;time  Knew the month, but did not know the year  -LM       Row Name 07/13/24 1515          Safety Issues, Functional Mobility    Safety Issues Affecting Function (Mobility) awareness of need for assistance;insight into deficits/self-awareness;safety precaution awareness;safety precautions follow-through/compliance  -LM     Impairments Affecting Function (Mobility) balance;endurance/activity tolerance;pain;strength  -LM               User Key  (r) = Recorded By, (t) = Taken By, (c) = Cosigned By      Initials Name Provider Type    LM Audra Metcalf, PT Physical Therapist                   Mobility       Row Name 07/13/24 1522          Bed Mobility    Bed Mobility supine-sit;sit-supine  -LM     Supine-Sit [place] (Bed Mobility) standby assist;verbal cues  -LM     Sit-Supine [place] (Bed Mobility) standby assist;verbal cues  -LM     Assistive Device (Bed Mobility) bed rails;head of bed elevated  -LM     Comment, (Bed Mobility) Educated on log rolling, but pt didn't complete.  -LM       Row Name 07/13/24 1522          Sit-Stand Transfer    Sit-Stand [place] (Transfers) contact guard;1 person assist;verbal cues  -LM     Assistive Device (Sit-Stand Transfers) walker, front-wheeled  -LM     Comment, (Sit-Stand Transfer) Vc's for hand placement.  -LM       Row Name 07/13/24 1522          Gait/Stairs (Locomotion)    [place]  Level (Gait) contact guard;1 person assist  -LM     Assistive Device (Gait) walker, front-wheeled  -LM     Distance in Feet (Gait) 140  -LM     Deviations/Abnormal Patterns (Gait) nya decreased;gait speed decreased;stride length decreased  -LM     Bilateral Gait Deviations weight shift ability decreased  -LM     Comment, (Gait/Stairs) Pt ambulates at a slow pace, but no unsteadiness noted.  -LM               User Key  (r) = Recorded By, (t) = Taken By, (c) = Cosigned By      Initials Name Provider Type    LM Audra Metcalf PT Physical Therapist                   Obj/Interventions       Row Name 07/13/24 1524          Range of Motion Comprehensive    General Range of Motion bilateral lower extremity ROM WFL  -LM       Row Name 07/13/24 1524          Strength Comprehensive (MMT)    General Manual Muscle Testing (MMT) Assessment lower extremity strength deficits identified  -LM     Comment, General Manual Muscle Testing (MMT) Assessment RLE grossly 4/5 throughout;  LLE - Hip flex - 4-/5;  Remainder grossly 4/5 throughout  -LM       Row Name 07/13/24 1524          Balance    Balance Assessment sitting static balance;standing static balance;standing dynamic balance  -LM     Static Sitting Balance standby assist  -LM     Position, Sitting Balance unsupported  -LM     Static Standing Balance contact guard;verbal cues  -LM     Dynamic Standing Balance contact guard;verbal cues  -LM     Position/Device Used, Standing Balance supported;walker, front-wheeled  -LM       Row Name 07/13/24 1524          Sensory Assessment (Somatosensory)    Sensory Assessment (Somatosensory) LE sensation intact  -LM               User Key  (r) = Recorded By, (t) = Taken By, (c) = Cosigned By      Initials Name Provider Type    Audra Parra PT Physical Therapist                   Goals/Plan       Row Name 07/13/24 1527          Bed Mobility Goal 1 (PT)    Activity/Assistive Device (Bed Mobility Goal 1, PT) sit to supine/supine to sit  -LM      Lamar Level/Cues Needed (Bed Mobility Goal 1, PT) independent  -LM     Time Frame (Bed Mobility Goal 1, PT) short term goal (STG);5 days  -LM       Row Name 07/13/24 1527          Transfer Goal 1 (PT)    Activity/Assistive Device (Transfer Goal 1, PT) bed-to-chair/chair-to-bed  -LM     [place]amar Level/Cues Needed (Transfer Goal 1, PT) modified independence  -LM     Time Frame (Transfer Goal 1, PT) long term goal (LTG);10 days  -LM       Row Name 07/13/24 1527          Gait Training Goal 1 (PT)    Activity/Assistive Device (Gait Training Goal 1, PT) gait (walking locomotion);assistive device use  -LM     Lamar Level (Gait Training Goal 1, PT) modified independence  -LM     Distance (Gait Training Goal 1, PT) 150 feet  -LM     Time Frame (Gait Training Goal 1, PT) long term goal (LTG);10 days  -LM       Row Name 07/13/24 1527          Therapy Assessment/Plan (PT)    Planned Therapy Interventions (PT) balance training;bed mobility training;gait training;home exercise program;motor coordination training;neuromuscular re-education;patient/family education;postural re-education;ROM (range of motion);stair training;strengthening;stretching;transfer training  -LM               User Key  (r) = Recorded By, (t) = Taken By, (c) = Cosigned By      Initials Name Provider Type    LM Audra Metcalf, PT Physical Therapist                   Clinical Impression       Row Name 07/13/24 1525          Pain    Pretreatment Pain Rating 7/10  -LM     Posttreatment Pain Rating 7/10  -LM     Pain Location lower  -LM     Pain Location - back  -LM     Pain Intervention(s) Repositioned;Ambulation/increased activity;Nursing Notified  -       Row Name 07/13/24 1522          Plan of Care Review    Plan of Care Reviewed With patient  -LM     Outcome Evaluation PT evaluation completed.  Pt stood and ambulated 140 feet using rw with CGAx1.  Pt presents below baseline function d/t weakness, pain, and decreased activity tolerance.   Recommend home with assist and HH PT at d/c.  -LM       Row Name 07/13/24 1525          Therapy Assessment/Plan (PT)    Patient/Family Therapy Goals Statement (PT) to return home  -LM     Rehab Potential (PT) good, to achieve stated therapy goals  -LM     Criteria for Skilled Interventions Met (PT) yes;meets criteria;skilled treatment is necessary  -LM     Therapy Frequency (PT) daily  -LM     Predicted Duration of Therapy Intervention (PT) 10 days  -LM       Row Name 07/13/24 1525          Vital Signs    Pretreatment Heart Rate (beats/min) 68  -LM     Posttreatment Heart Rate (beats/min) 76  -LM     Pre SpO2 (%) 92  -LM     O2 Delivery Pre Treatment room air  -LM     Post SpO2 (%) 94  -LM     O2 Delivery Post Treatment room air  -LM     Pre Patient Position Supine  -LM     Post Patient Position Supine  -LM       Row Name 07/13/24 1525          Positioning and Restraints    Pre-Treatment Position in bed  -LM     Post Treatment Position bed  -LM     In Bed supine;call light within reach;encouraged to call for assist;exit alarm on;notified nsg  -LM               User Key  (r) = Recorded By, (t) = Taken By, (c) = Cosigned By      Initials Name Provider Type    LM Audra Metcalf, PT Physical Therapist                   Outcome Measures       Row Name 07/13/24 1528          How much help from another person do you currently need...    Turning from your back to your side while in flat bed without using bedrails? 3  -LM     Moving from lying on back to sitting on the side of a flat bed without bedrails? 3  -LM     Moving to and from a bed to a chair (including a wheelchair)? 3  -LM     Standing up from a chair using your arms (e.g., wheelchair, bedside chair)? 3  -LM     Climbing 3-5 steps with a railing? 3  -LM     To walk in hospital room? 3  -LM     AM-PAC 6 Clicks Score (PT) 18  -LM     Highest Level of Mobility Goal 6 --> Walk 10 steps or more  -LM       Row Name 07/13/24 1528 07/13/24 1527       Functional Assessment     Outcome Measure Options AM-PAC 6 Clicks Basic Mobility (PT)  - AM-PAC 6 Clicks Daily Activity (OT)  -AC              User Key  (r) = Recorded By, (t) = Taken By, (c) = Cosigned By      Initials Name Provider Type    AC Amna Zapata, OT Occupational Therapist    Audra Parra, PT Physical Therapist                                 Physical Therapy Education       Title: PT OT SLP Therapies (In Progress)       Topic: Physical Therapy (In Progress)       Point: Mobility training (Done)       Learning Progress Summary             Patient Acceptance, E, VU,NR by  at 7/13/2024 1529                         Point: Home exercise program (Not Started)       Learner Progress:  Not documented in this visit.              Point: Precautions (Done)       Learning Progress Summary             Patient Acceptance, E, VU,NR by  at 7/13/2024 1529                                         User Key       Initials Effective Dates Name Provider Type Discipline     07/11/23 -  Audra Metcalf, PT Physical Therapist PT                  PT Recommendation and Plan  Planned Therapy Interventions (PT): balance training, bed mobility training, gait training, home exercise program, motor coordination training, neuromuscular re-education, patient/family education, postural re-education, ROM (range of motion), stair training, strengthening, stretching, transfer training  Plan of Care Reviewed With: patient  Outcome Evaluation: PT evaluation completed.  Pt stood and ambulated 140 feet using rw with CGAx1.  Pt presents below baseline function d/t weakness, pain, and decreased activity tolerance.  Recommend home with assist and HH PT at d/c.     Time Calculation:   PT Evaluation Complexity  History, PT Evaluation Complexity: 3 or more personal factors and/or comorbidities  Examination of Body Systems (PT Eval Complexity): total of 3 or more elements  Clinical Presentation (PT Evaluation Complexity): stable  Clinical Decision Making (PT  Evaluation Complexity): low complexity  Overall Complexity (PT Evaluation Complexity): low complexity     PT Charges       Row Name 07/13/24 1528             Time Calculation    Start Time 1432  -LM      PT Received On 07/13/24  -LM      PT Goal Re-Cert Due Date 07/23/24  -LM         Untimed Charges    PT Eval/Re-eval Minutes 46  -LM         Total Minutes    Untimed Charges Total Minutes 46  -LM       Total Minutes 46  -LM                User Key  (r) = Recorded By, (t) = Taken By, (c) = Cosigned By      Initials Name Provider Type    LM Audra Metcalf, PT Physical Therapist                  Therapy Charges for Today       Code Description Service Date Service Provider Modifiers Qty    80375589395 HC PT EVAL LOW COMPLEXITY 4 7/13/2024 Audra Metcalf, PT GP 1            PT G-Codes  Outcome Measure Options: AM-PAC 6 Clicks Basic Mobility (PT)  AM-PAC 6 Clicks Score (PT): 18  AM-PAC 6 Clicks Score (OT): 16  PT Discharge Summary  Anticipated Discharge Disposition (PT): home with assist, home with home health    Audra Metcalf, KOLE  7/13/2024

## 2024-07-13 NOTE — H&P
"    [facility]  HISTORY AND PHYSICAL    Patient Name: Mariia Roberson  : 1940  MRN: [id number]  Primary Care Physician: Susan Waters APRN  Date of admission: 2024    Subjective   Subjective     Chief Complaint:  Melena     HPI:  Mariia Roberson is a 84 y.o. 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 Perez) who presented to the ED w/ c/o melena.   Pt admitted to [facility] -7/3/24. Pt presented w/ c/o back pain and found to have a subacute compression fracture at T9 and T11. Neurosurgery consulted and pt underwent a vertebroplasty w/  on 24. Creatinine slightly elevated during hospitalization but improved w/ IVF. Lisinopril d/c 2/2 cough. Pt was d/c home w/ Home Health Services.   Pt reports that soon after d/c on 7/3 she developed a cough. She was started on Doxycyline by her PCP ~ 4 days ago. Pt continues to have a cough but does report improvement. Pt reports that her sputum has \"cleared and lessened\". Yesterday pt noted dark stools. Today she had several more bowel movements concerning for melena. Family describes that pt had a large BM that was c/w a possible large clot. Pt's Home Health nurse was at the home this afternoon and instructed the pt to come to the ED for further evaluation.   Per family, pt also has had difficulty w/ starting her urine stream. Described as \"straining\" when she first attempts to urinate. Pt denies dysuria, urgency or frequency. Pt denies fever, dyspnea, chest pain, N/V/D, abdominal pain, edema, syncope.   Pt evaluated in the ED. Fecal occult blood positive. H/H 10.7/33.8. WBC 16.20. UA c/w pyuria. Pt admitted to the hospital medicine service for further evaluation.     Review of Systems   Constitutional:  Positive for fatigue. Negative for chills and fever.        10 pound (unintentional) weight loss over last 1-2 weeks.  "   HENT: Negative.  Negative for congestion, postnasal drip, rhinorrhea, sinus pain and trouble swallowing.    Eyes: Negative.  Negative for visual disturbance.   Respiratory:  Positive for cough. Negative for chest tightness, shortness of breath and wheezing.    Cardiovascular: Negative.  Negative for chest pain, palpitations and leg swelling.   Gastrointestinal:  Positive for blood in stool. Negative for abdominal distention, abdominal pain, constipation, diarrhea, nausea and vomiting.   Endocrine: Negative.    Genitourinary:  Positive for difficulty urinating. Negative for decreased urine volume, dysuria, flank pain, frequency, hematuria and urgency.   Musculoskeletal:  Positive for back pain. Negative for arthralgias, myalgias, neck pain and neck stiffness.   Skin: Negative.  Negative for wound.   Allergic/Immunologic: Negative.  Negative for immunocompromised state.   Neurological: Negative.  Negative for dizziness, syncope, facial asymmetry, speech difficulty, weakness, light-headedness and headaches.   Hematological: Negative.  Does not bruise/bleed easily.   Psychiatric/Behavioral: Negative.  Negative for confusion.    All other systems reviewed and are negative.     Personal History     Past Medical History:   Diagnosis Date   • AAA (abdominal aortic aneurysm)    • Arthritis    • CHF (congestive heart failure)    • Glaucoma    • Hypertension    • Stroke      Past Surgical History:   Procedure Laterality Date   • INTERVENTIONAL RADIOLOGY PROCEDURE N/A 07/01/2024    Procedure: IR vertebroplasty thoracic with fluoroscopy, T9 and T10;  Surgeon: Javier Perez MD;  Location: [facility] INVASIVE LOCATION;  Service: Interventional Radiology;  Laterality: N/A;   • JOINT REPLACEMENT     • VERTEBROPLASTY  07/01/2024     Family History: family history includes No Known Problems in her father and mother.     Social History:  reports that she quit smoking about 3 years ago. Her smoking use included cigarettes. She  started smoking about 64 years ago. She has a 61 pack-year smoking history. She does not have any smokeless tobacco history on file. She reports that she does not currently use alcohol. She reports that she does not use drugs.  Social History     Social History Narrative   • Not on file     Medications:  Lidocaine, acetaminophen, atorvastatin, brimonidine, busPIRone, dorzolamide-timolol, doxycycline, ferrous sulfate, furosemide, latanoprost, losartan, oxyCODONE, pantoprazole, potassium chloride, and traZODone    No Known Allergies    Objective   Objective     Vital Signs:   Temp:  [98.8 °F (37.1 °C)] 98.8 °F (37.1 °C)  Heart Rate:  [] 85  Resp:  [18] 18  BP: (100-159)/() 157/93    Physical Exam     Constitutional: Awake, alert; chronically ill appearing   Eyes: Left pupil- ERRLA; right pupil fixed (chronic 2/2 remote CVA); sclerae anicteric, no conjunctival injection  HENT: NCAT, mucous membranes dry   Neck: Supple, no thyromegaly, no lymphadenopathy, trachea midline  Respiratory: Course lower lobes w/ faint rhonchi, no wheeze, nonlabored respirations   Cardiovascular: RRR, no murmurs, rubs, or gallops, no peripheral edema   Gastrointestinal: Positive bowel sounds, soft, nontender, nondistended  Musculoskeletal: Normal ROM bilaterally   Psychiatric: Appropriate affect, cooperative  Neurologic: Oriented x 3, strength symmetric in all extremities, speech clear, no facial droop   Skin: No rashes, lesions or wounds     Result Review:  I have personally reviewed the results from the time of this admission to 7/12/2024 20:58 EDT and agree with these findings:  [x]  Laboratory list / accordion  []  Microbiology  [x]  Radiology  []  EKG/Telemetry   []  Cardiology/Vascular   []  Pathology  [x]  Old records    LAB RESULTS:      Lab 07/12/24  1704   WBC 16.20*   HEMOGLOBIN 10.7*   HEMATOCRIT 33.8*   PLATELETS 457*   NEUTROS ABS 11.45*   IMMATURE GRANS (ABS) 0.07*   LYMPHS ABS 3.60*   MONOS ABS 0.84   EOS ABS 0.17    MCV 92.6   PROCALCITONIN 0.07   LACTATE 1.5   D DIMER QUANT 1.11*         Lab 07/12/24  1715 07/12/24  1704   SODIUM  --  138   POTASSIUM  --  3.6   CHLORIDE  --  97*   CO2  --  31.0*   ANION GAP  --  10.0   BUN  --  20   CREATININE 1.40* 1.32*   EGFR  --  39.4*   GLUCOSE  --  131*   CALCIUM  --  9.1         Lab 07/12/24  1704   TOTAL PROTEIN 7.3   ALBUMIN 3.5   GLOBULIN 3.8   ALT (SGPT) 10   AST (SGOT) 16   BILIRUBIN 0.3   ALK PHOS 150*                 Lab 07/12/24  1853 07/12/24  1736   ABO TYPING A A   RH TYPING Positive Positive   ANTIBODY SCREEN  --  Negative         Brief Urine Lab Results  (Last result in the past 365 days)        Color   Clarity   Blood   Leuk Est   Nitrite   Protein   CREAT   Urine HCG        07/12/24 1640 Yellow   Cloudy   Moderate (2+)   Moderate (2+)   Negative   100 mg/dL (2+)                 Microbiology Results (last 10 days)       Procedure Component Value - Date/Time    Respiratory Panel PCR w/COVID-19(SARS-CoV-2) SANCHEZ/REYNALDO/ERLIN/PAD/COR/FELICIA In-House, NP Swab in UTM/VTM, 2 HR TAT - Swab, Nasopharynx [[id number]]  (Normal) Collected: 07/12/24 1738    Lab Status: Final result Specimen: Swab from Nasopharynx Updated: 07/1940     ADENOVIRUS, PCR Not Detected     Coronavirus 229E Not Detected     Coronavirus HKU1 Not Detected     Coronavirus NL63 Not Detected     Coronavirus OC43 Not Detected     COVID19 Not Detected     Human Metapneumovirus Not Detected     Human Rhinovirus/Enterovirus Not Detected     Influenza A PCR Not Detected     Influenza B PCR Not Detected     Parainfluenza Virus 1 Not Detected     Parainfluenza Virus 2 Not Detected     Parainfluenza Virus 3 Not Detected     Parainfluenza Virus 4 Not Detected     RSV, PCR Not Detected     Bordetella pertussis pcr Not Detected     Bordetella parapertussis PCR Not Detected     Chlamydophila pneumoniae PCR Not Detected     Mycoplasma pneumo by PCR Not Detected    Narrative:      In the setting of a positive respiratory panel  with a viral infection PLUS a negative procalcitonin without other underlying concern for bacterial infection, consider observing off antibiotics or discontinuation of antibiotics and continue supportive care. If the respiratory panel is positive for atypical bacterial infection (Bordetella pertussis, Chlamydophila pneumoniae, or Mycoplasma pneumoniae), consider antibiotic de-escalation to target atypical bacterial infection.    Respiratory Panel PCR w/COVID-19(SARS-CoV-2) SANCHEZ/REYNALDO/ERLIN/PAD/COR/FELICIA In-House, NP Swab in UTM/VTM, 2 HR TAT - Swab, Nasopharynx [[id number]]  (Abnormal) Collected: 07/03/24 0943    Lab Status: Final result Specimen: Swab from Nasopharynx Updated: 07/03/24 1109     ADENOVIRUS, PCR Not Detected     Coronavirus 229E Not Detected     Coronavirus HKU1 Not Detected     Coronavirus NL63 Not Detected     Coronavirus OC43 Not Detected     COVID19 Not Detected     Human Metapneumovirus Not Detected     Human Rhinovirus/Enterovirus Detected     Influenza A PCR Not Detected     Influenza B PCR Not Detected     Parainfluenza Virus 1 Not Detected     Parainfluenza Virus 2 Not Detected     Parainfluenza Virus 3 Not Detected     Parainfluenza Virus 4 Not Detected     RSV, PCR Not Detected     Bordetella pertussis pcr Not Detected     Bordetella parapertussis PCR Not Detected     Chlamydophila pneumoniae PCR Not Detected     Mycoplasma pneumo by PCR Not Detected    Narrative:      In the setting of a positive respiratory panel with a viral infection PLUS a negative procalcitonin without other underlying concern for bacterial infection, consider observing off antibiotics or discontinuation of antibiotics and continue supportive care. If the respiratory panel is positive for atypical bacterial infection (Bordetella pertussis, Chlamydophila pneumoniae, or Mycoplasma pneumoniae), consider antibiotic de-escalation to target atypical bacterial infection.          CT Abdomen Pelvis With & Without Contrast    Result  Date: 7/12/2024  CT ABDOMEN PELVIS W WO CONTRAST Date of Exam: 7/12/2024 6:03 PM EDT Indication: GIB protocol. Comparison: None available. Technique: Axial CT images were obtained of the abdomen and pelvis before and after the uneventful intravenous administration of 75 cc Isovue-370 IV contrast . Sagittal and coronal reconstructions were performed.  Automated exposure control and iterative  reconstruction methods were used. FINDINGS: Lung bases: Mucoid impaction within both lower lobe bronchi. 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 3.1 cm fusiform aneurysm of the infrarenal abdominal aorta.. Liver:No masses. No intrahepatic biliary ductal dilatation. Spleen: Calcified granulomata Pancreas:No pancreatic masses. No evidence of pancreatitis. Gallbladder and common bile duct:No evidence of cholelithiasis. No evidence of cholecystitis. Adrenal glands:No adrenal masses Kidneys and ureters: Significantly diminished enhancement of the right kidney with severe atheromatous disease at the origin of the right kidney. The right kidney appears atrophic. Nonobstructing right renal calculus. No calculi present within the ureters. Normal caliber ureters. Urinary bladder:No urinary bladder wall thickening. No bladder masses. Small bowel: Small focus of enhancement involving the anterior gastric wall may represent the source of the GI bleed. Large bowel: Extensive colonic diverticulosis without evidence of diverticulitis. Appendix: Normal GENITOURINARY: Normal appearance of the uterus Ascites or pneumoperitoneum:None. Adenopathy:None present Osseous structures: Left hip replacement. Prior gamma nailing of a right hip fracture. Degenerative changes of the hips. Degenerative changes of the lumbar spine. Prior vertebroplasty of T11. No lytic or blastic disease. Other findings: None     Impression: 1. Small focus of enhancement involving the  anterior gastric wall may represent the origin of the patient's GI bleed. 2. There is a 6.2 cm fusiform aneurysm of the descending thoracic aorta with continuation of the aneurysm into the upper abdomen. There is a 3.1 cm aneurysm of the infrarenal abdominal aorta. 3. Significantly diminished enhancement of the right kidney with severe atheromatous disease at the origin of the right renal artery. Right renal atrophy. 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 Electronically Signed: Roly Garcia MD  7/12/2024 6:46 PM EDT  Workstation ID: [id number]    XR Chest 1 View    Result Date: 7/12/2024  XR CHEST 1 VW Date of Exam: 7/12/2024 5:55 PM EDT Indication: cough Comparison: 7/3/2024 at 0910 hours FINDINGS: No new consolidations or pleural effusions are observed. The cardiac silhouette and mediastinum are stable. No acute osseous abnormalities are identified. Kyphoplasty/vertebroplasty changes are noted.     Impression: There is no significant change when compared to the prior study. There is no evidence for acute cardiopulmonary process. Electronically Signed: Murphy Hooker MD  7/12/2024 6:21 PM EDT  Workstation ID: [id number]       Assessment & Plan   Assessment & Plan       Melena    HTN (hypertension)    Pyuria    Leukocytosis    GIB (gastrointestinal bleeding)    Acute blood loss anemia    Cough    AAA (abdominal aortic aneurysm)    Previous back surgery    CHF (congestive heart failure)    Acute kidney injury    Kristine Roberson is a 84 y.o. 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 Perez) who presented to the ED w/ c/o melena.     **Acute gastrointestinal hemorrhage   **Acute blood loss anemia   **Hx of iron deficiency anemia   -no prior hx of GIB, no prior hx of colonoscopy or EGD   -CT abd/pel obtained; results above (Small focus of enhancement  "involving the anterior gastric wall may represent the origin of the patient's GI bleed)  -previous H/H 12.2/38.7 on 7/2/24  -current H/H 10.7/33.8; trend q 4 hours (x 24 hours)  -type/screen   -IV Protonix   -clear liquid diet; NPO after MN pending GI recommendations   -reports taking a baby ASA daily; hold  -iron supplement continued   -coags w/ am labs   -symptom mgt  -GI consult in am     **Acute kidney injury   -likely in setting of CKD   -per CT abd/pel: Significantly diminished enhancement of the right kidney with severe atheromatous disease at the origin of the right renal artery. Right renal atrophy. 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   -previous creatinine in 1/2024 normal at 1.0  -during recent admission creatinine trend: 1.10-1.34 (1.10 at d/c on 7/2/24)  -current creatinine 1.40 w/ eGFR 39.4  -UA c/w pyuria; urine cx pending   -urine studies pending   -NS @ 75ml/hour x 12 hours   -hold routine lasix and losartan   -repeat labs in am     **Cough   -started on Doxycyline (by PCP) on 7/9 for URI; cough has improved   -continue Doxycyline   -respiratory panel PCR negative   -CXR negative   -D.Dimer pending   -proBNP pending   -currently 94% on room air   -symptom mgt     **Pyuria   -pt denies urinary symptoms (family reports that pt has difficulty w/ starting urine stream, describing that she \"strains\")  -UA c/w pyuria; urine cx pending   -bladder scan w/ PVR    **Leukocytosis   -currently WBC 16.20 (12.05 on 7/2)  -afebrile   -lactic acid and procal both WNL  -sed rate and crp pending   -UA w/ pyuria; no urinary symptoms; cx pending  -CXR unremarkable   -Doxy continued for cough (started by PCP ~4 days ago); cough improved   -repeat CBC in am     **S/p Vertebroplasty 2/2 compression fx at T9 and T11  -7/1/24, Dr. Boyd   -d/c home on 7/3 w/ Home Health   -has f/u ~ 24th of this month w/ NS  -continues to c/o pain; prn oxycodone continued "   -fall precautions   -pt/ot and case mgt consult       **AAA  -per CT abd/pel: 6.2 cm fusiform aneurysm of the descending thoracic aorta with continuation of the aneurysm into the upper abdomen. There is a 3.1 cm aneurysm of the infrarenal abdominal aorta   -previous CT [place] [address] w/ reported AAA  -consider CT Surgery consult; unclear it pt follows w/ CT Surgery     **HTN   **HLD   **CHF (data deficient)   -EKG pending   -proBNP pending   -routine lasix and losartan held   -statin continued   -monitor I/Os     **Anxiety   -continue Trazodone and Buspar     DVT prophylaxis:  Mechanical     CODE STATUS:    Medical Intervention Limits: No intubation (DNI)  Level Of Support Discussed With: Patient  Code Status (Patient has no pulse and is not breathing): No CPR (Do Not Attempt to Resuscitate)  Medical Interventions (Patient has pulse or is breathing): Limited Support  Comments: DNR/DNI    Expected Discharge  Expected Discharge Date: 7/14/2024; Expected Discharge Time:     Signature: Electronically signed by DENISA Muller, 07/12/24, 8:58 PM EDT.    Time spent: 55 minutes

## 2024-07-13 NOTE — ANESTHESIA PREPROCEDURE EVALUATION
Anesthesia Evaluation     Patient summary reviewed and Nursing notes reviewed   NPO Solid Status: > 8 hours  NPO Liquid Status: > 8 hours           Airway   Mallampati: I  TM distance: >3 FB  Neck ROM: full  No difficulty expected  Dental      Pulmonary     breath sounds clear to auscultation  Cardiovascular     ECG reviewed  Rhythm: regular  Rate: normal    (+) hypertension, CHF Systolic <55%, PVD (6.2 cm thoracic aneursym)      Neuro/Psych  (+) CVA, psychiatric history  GI/Hepatic/Renal/Endo    (+) GI bleeding , renal disease- CRI    Musculoskeletal     Abdominal    Substance History      OB/GYN          Other   arthritis,                   Anesthesia Plan    ASA 3     general     intravenous induction     Anesthetic plan, risks, benefits, and alternatives have been provided, discussed and informed consent has been obtained with: patient.    Plan discussed with CRNA.    CODE STATUS:    Medical Intervention Limits: No intubation (DNI)  Level Of Support Discussed With: Patient  Code Status (Patient has no pulse and is not breathing): No CPR (Do Not Attempt to Resuscitate)  Medical Interventions (Patient has pulse or is breathing): Limited Support  Comments: DNR/DNI

## 2024-07-13 NOTE — CONSULTS
[facility]  Inpatient Gastroenterology Consult    Inpatient Gastroenterology Consult  Consult performed by: Munir Horan APRN  Consult ordered by: Marcella Jurado APRN  Reason for consult: Melena      Referring Provider: No ref. provider found    PCP: Susan Waters APRN    Chief Complaint: Dark stools    History of present illness:    Mariia Roberson is a 84 y.o. female who is admitted with dark stools/bloody stools.  Patient notes that she was admitted to hospital earlier this month with back pain and found to have a back fracture requiring vertebroplasty.  Following this, patient notes she had done well for a few days but over the past 3 to 4 days has had persistent dark tarry stools.  Initially denied any evidence of abdominal pain though on exam has significant epigastric pain.  Reports no prior issues with gastrointestinal bleeding.  Denies any N/V/D, SOB, CP, or F/C.  Denies any use of NSAIDs though she is unsure what pain medicine she has been on following her vertebroplasty.  Is not anticoagulated.  At time of admission, hemoglobin is 10.7 with fecal occult blood test positive.  CT imaging reviewed with concerns for small area of enhancement along the anterior gastric wall. Past medical, surgical, social, and family histories are reviewed for accuracy.  No documented alleviating or exacerbating factors.  Does not endorse pain at time of exam.    Allergies:  Patient has no known allergies.    Scheduled Meds:  atorvastatin, 10 mg, Oral, Nightly  brimonidine, 1 drop, Right Eye, BID  busPIRone, 5 mg, Oral, BID  dorzolamide (TRUSOPT) 2 % 1 drop, timolol (TIMOPTIC) 0.5 % 1 drop for Cosopt 22.3-6.8 mg/mL, , Right Eye, BID  doxycycline, 100 mg, Oral, BID  ferrous sulfate, 325 mg, Oral, Daily With Lunch  ipratropium-albuterol, 3 mL, Nebulization, 4x Daily - RT  latanoprost, 1 drop, Both Eyes, Nightly  pantoprazole, 40 mg, Intravenous, Q12H  sodium chloride, 10 mL, Intravenous,  Q12H  traZODone, 50 mg, Oral, Nightly         Infusions:  sodium chloride, 75 mL/hr        PRN Meds:    droperidol **OR** droperidol    fentanyl    ipratropium-albuterol    ipratropium-albuterol    lactated ringers    melatonin    oxyCODONE    sodium chloride    sodium chloride    sodium chloride    Home Meds:  Medications Prior to Admission   Medication Sig Dispense Refill Last Dose    acetaminophen (Tylenol) 325 MG tablet Take 2 tablets by mouth Every 6 (Six) Hours As Needed for Mild Pain. (Patient taking differently: Take 2 tablets by mouth Every 6 (Six) Hours As Needed for Mild Pain, Headache or Fever. OTC)   Past Week    atorvastatin (LIPITOR) 10 MG tablet Take 1 tablet by mouth Every Night.   7/11/2024    brimonidine (ALPHAGAN) 0.2 % ophthalmic solution Administer 1 drop to the right eye 2 (Two) Times a Day.   7/12/2024    busPIRone (BUSPAR) 5 MG tablet Take 1 tablet by mouth 2 (Two) Times a Day.   7/12/2024    dorzolamide-timolol (COSOPT) 2-0.5 % ophthalmic solution Administer 1 drop to the right eye 2 (Two) Times a Day.   7/12/2024    doxycycline (MONODOX) 100 MG capsule Take 1 capsule by mouth 2 (Two) Times a Day. For 7 days, started on 07-09-24 7/12/2024    furosemide (LASIX) 20 MG tablet Take 1 tablet by mouth Daily.   7/12/2024    latanoprost (XALATAN) 0.005 % ophthalmic solution Administer 1 drop to both eyes Every Night.   7/11/2024    Lidocaine 4 % Place 1 patch on the skin as directed by provider Daily. Remove & Discard patch within 12 hours or as directed by MD 5 each 0 7/11/2024    losartan (COZAAR) 25 MG tablet Take 1 tablet by mouth Daily. 30 tablet 1 7/12/2024    oxyCODONE (ROXICODONE) 5 MG immediate release tablet Take 1 tablet by mouth Every 8 (Eight) Hours As Needed for Moderate Pain. Indications: Moderate Pain 9 tablet 0 7/12/2024    pantoprazole (PROTONIX) 40 MG EC tablet Take 1 tablet by mouth Daily.   7/12/2024    potassium chloride (MICRO-K) 10 MEQ CR capsule Take 1 capsule by mouth  Daily. Takes with lasix   7/12/2024    traZODone (DESYREL) 50 MG tablet Take 1 tablet by mouth Every Night.   7/11/2024    ferrous sulfate 325 (65 FE) MG tablet Take 1 tablet by mouth Daily With Breakfast. OTC          ROS: Review of Systems   Constitutional:  Positive for activity change, appetite change, fatigue and unexpected weight change. Negative for chills, diaphoresis and fever.   HENT:  Negative for sore throat, trouble swallowing and voice change.    Eyes: Negative.    Respiratory:  Negative for apnea, cough, choking, chest tightness, shortness of breath, wheezing and stridor.    Cardiovascular:  Negative for chest pain, palpitations and leg swelling.   Gastrointestinal:  Positive for abdominal pain and blood in stool. Negative for abdominal distention, anal bleeding, constipation, diarrhea, nausea, rectal pain and vomiting.   Genitourinary: Negative.    Musculoskeletal: Negative.    Skin: Negative.    Allergic/Immunologic: Negative.    Neurological: Negative.    Hematological: Negative.    Psychiatric/Behavioral: Negative.     All other systems reviewed and are negative.      PAST MED HX:  Past Medical History:   Diagnosis Date    AAA (abdominal aortic aneurysm)     Arthritis     CHF (congestive heart failure)     Glaucoma     Hypertension     Stroke        PAST SURG HX:  Past Surgical History:   Procedure Laterality Date    INTERVENTIONAL RADIOLOGY PROCEDURE N/A 07/01/2024    Procedure: IR vertebroplasty thoracic with fluoroscopy, T9 and T10;  Surgeon: Javier Perez MD;  Location: [facility] INVASIVE LOCATION;  Service: Interventional Radiology;  Laterality: N/A;    JOINT REPLACEMENT      VERTEBROPLASTY  07/01/2024       FAM HX:  Family History   Problem Relation Age of Onset    No Known Problems Mother     No Known Problems Father        SOC HX:  Social History     Socioeconomic History    Marital status:    Tobacco Use    Smoking status: Former     Current packs/day: 0.00     Average  "packs/day: 1 pack/day for 61.0 years (61.0 ttl pk-yrs)     Types: Cigarettes     Start date: 1960     Quit date: 2021     Years since quitting: 3.5   Vaping Use    Vaping status: Never Used   Substance and Sexual Activity    Alcohol use: Not Currently    Drug use: Never    Sexual activity: Not Currently       PHYSICAL EXAM  BP (!) 194/111 (BP Location: Left arm, Patient Position: Lying)   Pulse 97   Temp 97 °F (36.1 °C) (Tympanic)   Resp 16   Ht 152.4 cm (60\")   Wt 39.9 kg (88 lb)   SpO2 96%   BMI 17.19 kg/m²   Wt Readings from Last 3 Encounters:   07/12/24 39.9 kg (88 lb)   06/28/24 44 kg (97 lb)   06/28/24 44 kg (97 lb)   ,body mass index is 17.19 kg/m².  Physical Exam  Vitals and nursing note reviewed.   Constitutional:       General: She is not in acute distress.     Appearance: Normal appearance. She is normal weight. She is not ill-appearing or toxic-appearing.   HENT:      Head: Normocephalic and atraumatic.   Eyes:      General: No scleral icterus.     Extraocular Movements: Extraocular movements intact.      Conjunctiva/sclera: Conjunctivae normal.      Pupils: Pupils are equal, round, and reactive to light.   Cardiovascular:      Rate and Rhythm: Normal rate and regular rhythm.      Pulses: Normal pulses.      Heart sounds: Normal heart sounds.   Pulmonary:      Effort: Pulmonary effort is normal. No respiratory distress.      Breath sounds: Normal breath sounds.   Abdominal:      General: Abdomen is flat. Bowel sounds are normal. There is no distension.      Palpations: Abdomen is soft. There is no mass.      Tenderness: There is abdominal tenderness. There is no guarding or rebound.      Hernia: No hernia is present.   Genitourinary:     Rectum: Guaiac result positive.   Musculoskeletal:         General: Normal range of motion.      Right lower leg: No edema.      Left lower leg: No edema.   Skin:     General: Skin is warm and dry.      Capillary Refill: Capillary refill takes less than 2 seconds. "      Coloration: Skin is pale. Skin is not jaundiced.   Neurological:      General: No focal deficit present.      Mental Status: She is alert and oriented to person, place, and time. Mental status is at baseline.   Psychiatric:         Mood and Affect: Mood normal.         Behavior: Behavior normal.         Thought Content: Thought content normal.         Judgment: Judgment normal.         Results Review:   I reviewed the patient's new clinical results.  I reviewed the patient's new imaging results and agree with the interpretation.  I reviewed the patient's other test results and agree with the interpretation  I personally viewed and interpreted the patient's EKG/Telemetry data    Lab Results   Component Value Date    WBC 13.72 (H) 07/13/2024    HGB 10.2 (L) 07/13/2024    HGB 10.2 (L) 07/13/2024    HGB 9.6 (L) 07/12/2024    HCT 31.7 (L) 07/13/2024    HCT 31.7 (L) 07/13/2024    MCV 89.5 07/13/2024     07/13/2024       Lab Results   Component Value Date    INR 1.08 07/13/2024       Lab Results   Component Value Date    GLUCOSE 114 (H) 07/13/2024    BUN 18 07/13/2024    CREATININE 1.01 (H) 07/13/2024    EGFRIFAFRI 55 01/29/2024    BCR 17.8 07/13/2024     (L) 07/13/2024    K 3.6 07/13/2024    CO2 28.0 07/13/2024    CALCIUM 8.5 (L) 07/13/2024    ALBUMIN 3.5 07/12/2024    ALKPHOS 150 (H) 07/12/2024    BILITOT 0.3 07/12/2024    ALT 10 07/12/2024    AST 16 07/12/2024       CT Abdomen Pelvis With & Without Contrast    Result Date: 7/12/2024  CT ABDOMEN PELVIS W WO CONTRAST Date of Exam: 7/12/2024 6:03 PM EDT Indication: GIB protocol. Comparison: None available. Technique: Axial CT images were obtained of the abdomen and pelvis before and after the uneventful intravenous administration of 75 cc Isovue-370 IV contrast . Sagittal and coronal reconstructions were performed.  Automated exposure control and iterative  reconstruction methods were used. FINDINGS: Lung bases: Mucoid impaction within both lower lobe  bronchi. 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 3.1 cm fusiform aneurysm of the infrarenal abdominal aorta.. Liver:No masses. No intrahepatic biliary ductal dilatation. Spleen: Calcified granulomata Pancreas:No pancreatic masses. No evidence of pancreatitis. Gallbladder and common bile duct:No evidence of cholelithiasis. No evidence of cholecystitis. Adrenal glands:No adrenal masses Kidneys and ureters: Significantly diminished enhancement of the right kidney with severe atheromatous disease at the origin of the right kidney. The right kidney appears atrophic. Nonobstructing right renal calculus. No calculi present within the ureters. Normal caliber ureters. Urinary bladder:No urinary bladder wall thickening. No bladder masses. Small bowel: Small focus of enhancement involving the anterior gastric wall may represent the source of the GI bleed. Large bowel: Extensive colonic diverticulosis without evidence of diverticulitis. Appendix: Normal GENITOURINARY: Normal appearance of the uterus Ascites or pneumoperitoneum:None. Adenopathy:None present Osseous structures: Left hip replacement. Prior gamma nailing of a right hip fracture. Degenerative changes of the hips. Degenerative changes of the lumbar spine. Prior vertebroplasty of T11. No lytic or blastic disease. Other findings: None     1. Small focus of enhancement involving the anterior gastric wall may represent the origin of the patient's GI bleed. 2. There is a 6.2 cm fusiform aneurysm of the descending thoracic aorta with continuation of the aneurysm into the upper abdomen. There is a 3.1 cm aneurysm of the infrarenal abdominal aorta. 3. Significantly diminished enhancement of the right kidney with severe atheromatous disease at the origin of the right renal artery. Right renal atrophy. Findings are consistent with chronic severe atheromatous disease at the right renal  artery origin. Electronically Signed: Roly Garcia MD  7/12/2024 6:46 PM EDT  Workstation ID: [id number]    XR Chest 1 View    Result Date: 7/12/2024  XR CHEST 1 VW Date of Exam: 7/12/2024 5:55 PM EDT Indication: cough Comparison: 7/3/2024 at 0910 hours FINDINGS: No new consolidations or pleural effusions are observed. The cardiac silhouette and mediastinum are stable. No acute osseous abnormalities are identified. Kyphoplasty/vertebroplasty changes are noted.     There is no significant change when compared to the prior study. There is no evidence for acute cardiopulmonary process. Electronically Signed: Mruphy Hooker MD  7/12/2024 6:21 PM EDT  Workstation ID: [id number]    XR Chest PA & Lateral    Result Date: 7/3/2024  XR CHEST PA AND LATERAL Date of Exam: 7/3/2024 8:56 AM EDT Indication: increasing cough Comparison: None available. Findings: Cardiomediastinal silhouette demonstrates the presence of a descending thoracic aortic aneurysm. There is nonspecific interstitial prominence, likely chronic. There is a small left pleural effusion. No airspace disease or pneumothorax. No acute osseous abnormality identified.     Impression: 1.Small left pleural effusion. No airspace disease identified. 2.Descending thoracic aortic aneurysm Electronically Signed: Stas Gupta MD  7/3/2024 9:25 AM EDT  Workstation ID: [id number]    Invasive peripheral vascular study    Result Date: 7/1/2024  Clinical indication: 84-year-old female admitted with intractable back pain, found to have acute T9 and T11 compression fractures. Primary diagnosis: 1.  Pathologic fracture of vertebral body secondary to osteoporosis 2.  Osteoporosis : Dr. Javier Perez. Access: Percutaneous transpedicular approach into the T9 and T11 vertebral body Estimated blood loss: Negligible Conscious sedation: Moderate sedation was provided by me for a total of 41 minutes.  Physical vitals were continuously monitored by an independently trained observer.  A  "total of 2 mg of Versed and 100 ug of fentanyl were administered intravenously. Procedures: 1.  Fluoroscopic guided percutaneous T9 kyphoplasty 2.  Fluoroscopic guided percutaneous T11 kyphoplasty 3.  Conscious sedation Complications: None apparent Technique:  Formal written consent for the procedure was obtained from the patient and family after explaining risks to include bleeding, infection, aberrant cement extrusion, spinal nerve injury, and failure to relieve pain.  The patient was placed prone on the biplane angiography table and the thoracolumbar region was prepped and draped in the usual, sterile fashion.  Local anesthesia with 1% lidocaine infiltration was achieved.  Additionally, intravenous fentanyl and Versed were given for patient comfort throughout the procedure, the details of which are documented in the nursing record.  Utilizing biplane fluoroscopic guidance, a single G21 trocar needle was placed into the T9 vertebral body via a right transpedicular approach.  A cavity was then created within the central portions of the T9 vertebral body utilizing a flex drill.  Next, approximately 3 mL of of a sterile methylmethacrylate/barium cement solution was instilled into the T9 vertebral body without difficulty.  The T9 vertebra was a near \"vertebral plana\" deformity, but there was good distribution of cement throughout the T9 vertebra.  Next, 2 G21 trocar needles were placed into the T11 vertebral body via transpedicular approach.  Augmentation of the T11 vertebra was then performed with G21 balloons.  Next, approximately 8 mL of a sterile methylmethacrylate/cement solution was instilled into the T11 vertebra without difficulty.  There was excellent distribution of cement throughout the T11 vertebra.  At the end of the procedure, all needles were removed and sterile dressing was applied to the puncture site.  The patient was transferred back to her floor bed for postprocedural care/recovery. Impression: " Technically successful fluoroscopic percutaneous kyphoplasty of T9 and T11 compression fractures.     MRI Thoracic Spine Without Contrast    Result Date: 6/28/2024  MRI THORACIC SPINE WO CONTRAST Date of Exam: 6/28/2024 1:41 PM EDT Indication: worsening back pain with CT showing new T11 compression fracture.  Comparison: None available. Technique:  Routine multiplanar/multisequence sequence images of the thoracic spine were obtained without contrast administration. Findings: There is accentuation of the thoracic kyphosis inferiorly. There is a severe compression fracture at T9 and a moderate compression fracture at T11 with associated marrow edema. The other thoracic vertebral bodies appear normal in height. There is mild disc desiccation in the midthoracic spine. There is a mild disc bulge at T9-10. There is mild scattered facet arthropathy. The spinal canal and neural foramina are widely patent throughout. The spinal cord appears normal in signal throughout. The posterior paravertebral soft tissues are unremarkable.     Impression: 1.Severe compression fracture at T9 and moderate compression fracture at T11 appear to represent subacute fractures. 2.Mild degenerative changes as described above. Electronically Signed: Willie Lee MD  6/28/2024 2:50 PM EDT  Workstation ID: [id number]     ASSESSMENTS/PLANS  1.  Gastrointestinal bleeding with melena  2.  Anemia, hemoglobin from 12.2 down to 10.7  3.  FOBT positive  4.  Epigastric abdominal pain  5.  RADHA on CKD.  6.  Leukocytosis, pyuria  7.  Vertebroplasty secondary to recent compression fractures  8.  AAA, history of surgical repair    Mariia Roberson is a 84 y.o. female who presents to hospital with melena.  Fortunately, hemoglobin has remained fairly stable though FOBT is positive.  CT imaging reviewed and does show area of enhancement in the anterior gastric wall concerning for bleed.  As such, recommend EGD today for further evaluation.  Patient does believe  she has had some intermittent NSAID exposure in form of ibuprofen.    >>> N.p.o.  >>> EGD today  >>> IV PPI twice daily  >>> Monitor H&H and transfuse per protocol  >>> Abstain from any further use of NSAIDs i.e. ibuprofen  >>> If EGD unremarkable and ongoing bleeding, will likely need colonoscopy.    I discussed the patient's findings and my recommendations with patient, nursing staff, and consulting provider    DENISA Mead  07/13/24  09:10 EDT

## 2024-07-13 NOTE — THERAPY EVALUATION
Patient Name: Mariia Roberson  : 1940    MRN: [id number]                              Today's Date: 2024       Admit Date: 2024    Visit Dx:     ICD-10-CM ICD-9-CM   1. Melena  K92.1 578.1   2. Anemia, unspecified type  D64.9 285.9   3. 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    Severe malnutrition    Melena    Pyuria    Leukocytosis    GIB (gastrointestinal bleeding)    Acute blood loss anemia    Cough    AAA (abdominal aortic aneurysm)    Previous back surgery    CHF (congestive heart failure)    Acute kidney injury    Anxiety     Past Medical History:   Diagnosis Date    AAA (abdominal aortic aneurysm)     Arthritis     CHF (congestive heart failure)     Glaucoma     Hypertension     Stroke      Past Surgical History:   Procedure Laterality Date    INTERVENTIONAL RADIOLOGY PROCEDURE N/A 2024    Procedure: IR vertebroplasty thoracic with fluoroscopy, T9 and T10;  Surgeon: Javier Perez MD;  Location: [facility] INVASIVE LOCATION;  Service: Interventional Radiology;  Laterality: N/A;    JOINT REPLACEMENT      VERTEBROPLASTY  2024      General Information       Row Name 24 1432          OT Time and Intention    Document Type evaluation  -AC     Mode of Treatment occupational therapy  -AC       Row Name 24 1432          General Information    Patient Profile Reviewed yes  -AC     Prior Level of Function independent:;all household mobility;feeding;grooming;min assist:;dressing;bathing  uses rollator to ambulate, family assists in/out of shower  -AC     Existing Precautions/Restrictions fall;spinal  vertebroplasty 2024, blind R eye  -AC     Barriers to Rehab previous functional deficit;visual deficit  -AC       Row Name 24  1432          Occupational Profile    Environmental Supports and Barriers (Occupational Profile) walk in shower with shower chair  -       Row Name 07/13/24 1432          Living Environment    People in Home child(loinel), adult  dtr that can assist 24/7  -       Row Name 07/13/24 1432          Home Main Entrance    Number of Stairs, Main Entrance one  -       Row Name 07/13/24 1432          Stairs Within Home, Primary    Number of Stairs, Within Home, Primary none  -       Row Name 07/13/24 1432          Cognition    Orientation Status (Cognition) oriented to;person;place;disoriented to;time  pt knew month, but not year  -       Row Name 07/13/24 1432          Safety Issues, Functional Mobility    Safety Issues Affecting Function (Mobility) awareness of need for assistance;insight into deficits/self-awareness;safety precaution awareness;safety precautions follow-through/compliance  -     Impairments Affecting Function (Mobility) balance;endurance/activity tolerance;pain;strength  -               User Key  (r) = Recorded By, (t) = Taken By, (c) = Cosigned By      Initials Name Provider Type     Amna Zapata OT Occupational Therapist                     Mobility/ADL's       Row Name 07/13/24 1517          Bed Mobility    Bed Mobility supine-sit;sit-supine  -     Supine-Sit [place] (Bed Mobility) verbal cues;standby assist  declined to log roll  -     Sit-Supine [place] (Bed Mobility) verbal cues;standby assist  -     Assistive Device (Bed Mobility) bed rails;head of bed elevated  -       Row Name 07/13/24 1517          Transfers    Transfers sit-stand transfer;toilet transfer  -       Row Name 07/13/24 1517          Sit-Stand Transfer    Sit-Stand [place] (Transfers) verbal cues;contact guard  -     Assistive Device (Sit-Stand Transfers) walker, front-wheeled  -     Comment, (Sit-Stand Transfer) VCs for hand placement  -       Row Name 07/13/24 1517          Toilet  Transfer    Type (Toilet Transfer) sit-stand  -     [place] Level (Toilet Transfer) verbal cues;contact guard  -     Assistive Device (Toilet Transfer) walker, front-wheeled  -     Comment, (Toilet Transfer) BSC over toilet, VCs for hand placement  -       Row Name 07/13/24 1517          Functional Mobility    Functional Mobility- Ind. Level verbal cues required;contact guard assist  -     Functional Mobility- Device walker, front-wheeled  -AC     Functional Mobility-Distance (Feet) 140  -     Functional Mobility- Safety Issues step length decreased  -       Row Name 07/13/24 1517          Activities of Daily Living    BADL Assessment/Intervention lower body dressing;toileting  -       Row Name 07/13/24 1517          Lower Body Dressing Assessment/Training    [place] Level (Lower Body Dressing) don;socks;shoes/slippers;maximum assist (25% patient effort)  -     Position (Lower Body Dressing) edge of bed sitting  -       Row Name 07/13/24 1517          Toileting Assessment/Training    [place] Level (Toileting) adjust/manage clothing;maximum assist (25% patient effort);change pad/brief;minimum assist (75% patient effort)  -     Assistive Devices (Toileting) commode;grab bar/safety frame  BSC over toilet  -     Position (Toileting) unsupported sitting;supported standing  -               User Key  (r) = Recorded By, (t) = Taken By, (c) = Cosigned By      Initials Name Provider Type     Amna Zapata OT Occupational Therapist                   Obj/Interventions       Row Name 07/13/24 1434          Sensory Assessment (Somatosensory)    Sensory Assessment (Somatosensory) UE sensation intact  -[facility] Name 07/13/24 1434          Vision Assessment/Intervention    Visual Impairment/Limitations corrective lenses full-time  blind R eye  -[facility] Name 07/13/24 1434          Range of Motion Comprehensive    Comment, General Range of Motion L shld limited  -       Row Name  07/13/24 1434          Strength Comprehensive (MMT)    Comment, General Manual Muscle Testing (MMT) Assessment L shld 3-/5,  BUE grossly 4-/5  -AC       Row Name 07/13/24 1434          Balance    Balance Assessment sitting static balance;standing static balance;standing dynamic balance  -AC     Static Sitting Balance standby assist  -AC     Position, Sitting Balance unsupported  -AC     Static Standing Balance verbal cues;contact guard  -AC     Dynamic Standing Balance verbal cues;contact guard  -AC     Position/Device Used, Standing Balance walker, rolling  -AC               User Key  (r) = Recorded By, (t) = Taken By, (c) = Cosigned By      Initials Name Provider Type    AC Amna Zapata OT Occupational Therapist                   Goals/Plan       Row Name 07/13/24 1526          Transfer Goal 1 (OT)    Activity/Assistive Device (Transfer Goal 1, OT) bed-to-chair/chair-to-bed;toilet;walker, rolling  -AC     [place] Level/Cues Needed (Transfer Goal 1, OT) standby assist  -AC     Time Frame (Transfer Goal 1, OT) long term goal (LTG);10 days  -AC     Progress/Outcome (Transfer Goal 1, OT) new goal;goal ongoing  -AC       Row Name 07/13/24 1526          Toileting Goal 1 (OT)    Activity/Device (Toileting Goal 1, OT) adjust/manage clothing  -AC     [place] Level/Cues Needed (Toileting Goal 1, OT) minimum assist (75% or more patient effort)  -AC     Time Frame (Toileting Goal 1, OT) long term goal (LTG);10 days  -AC     Progress/Outcome (Toileting Goal 1, OT) new goal;goal ongoing  -AC       Row Name 07/13/24 1526          Grooming Goal 1 (OT)    Activity/Device (Grooming Goal 1, OT) hair care;oral care;wash face, hands  -AC     [place] (Grooming Goal 1, OT) supervision required  -AC     Time Frame (Grooming Goal 1, OT) short term goal (STG);5 days  -AC     Strategies/Barriers (Grooming Goal 1, OT) standing sink side  -AC     Progress/Outcome (Grooming Goal 1, OT) new goal;goal ongoing  -AC       Row  Name 07/13/24 1526          Therapy Assessment/Plan (OT)    Planned Therapy Interventions (OT) activity tolerance training;BADL retraining;functional balance retraining;adaptive equipment training;occupation/activity based interventions;patient/caregiver education/training;strengthening exercise;transfer/mobility retraining  -               User Key  (r) = Recorded By, (t) = Taken By, (c) = Cosigned By      Initials Name Provider Type     Amna Zapata, OT Occupational Therapist                   Clinical Impression       Row Name 07/13/24 1434          Pain Assessment    Pretreatment Pain Rating 7/10  -     Posttreatment Pain Rating 7/10  -     Pain Location - back  -     Pain Intervention(s) Ambulation/increased activity;Repositioned;Nursing Notified  -       Row Name 07/13/24 1434          Plan of Care Review    Plan of Care Reviewed With patient  -     Outcome Evaluation Pt presents below baseline with self care/mobility d/t pain, weakness and decr activity tolerance.  Pt max A LBD, CGA toilet transfer,  CGA to ambulate in hallway with RW.  OT will follow to advance pt toward PLOF.  Recommend home with assist and HHOT.  -       Row Name 07/13/24 1434          Therapy Assessment/Plan (OT)    Rehab Potential (OT) good, to achieve stated therapy goals  -     Criteria for Skilled Therapeutic Interventions Met (OT) yes;skilled treatment is necessary  -     Therapy Frequency (OT) daily  -       Row Name 07/13/24 1434          Therapy Plan Review/Discharge Plan (OT)    Anticipated Discharge Disposition (OT) home with assist;home with home health  -       Row Name 07/13/24 1434          Vital Signs    Pre Systolic BP Rehab 146  -AC     Pre Treatment Diastolic BP 97  -AC     Pretreatment Heart Rate (beats/min) 69  -AC     Posttreatment Heart Rate (beats/min) 76  -AC     Pre SpO2 (%) 92  -AC     O2 Delivery Pre Treatment room air  -AC     O2 Delivery Intra Treatment room air  -AC     Post SpO2 (%)  94  -AC     O2 Delivery Post Treatment room air  -AC     Pre Patient Position Supine  -AC     Post Patient Position Supine  -AC       Row Name 07/13/24 1434          Positioning and Restraints    Pre-Treatment Position in bed  -AC     Post Treatment Position bed  -AC     In Bed notified nsg;fowlers;call light within reach;encouraged to call for assist;exit alarm on  -AC               User Key  (r) = Recorded By, (t) = Taken By, (c) = Cosigned By      Initials Name Provider Type    Amna Hernandez, OT Occupational Therapist                   Outcome Measures       Row Name 07/13/24 1527          How much help from another is currently needed...    Putting on and taking off regular lower body clothing? 2  -AC     Bathing (including washing, rinsing, and drying) 2  -AC     Toileting (which includes using toilet bed pan or urinal) 2  -AC     Putting on and taking off regular upper body clothing 3  -AC     Taking care of personal grooming (such as brushing teeth) 3  -AC     Eating meals 4  -AC     AM-PAC 6 Clicks Score (OT) 16  -AC       Row Name 07/13/24 1528          How much help from another person do you currently need...    Turning from your back to your side while in flat bed without using bedrails? 3  -LM     Moving from lying on back to sitting on the side of a flat bed without bedrails? 3  -LM     Moving to and from a bed to a chair (including a wheelchair)? 3  -LM     Standing up from a chair using your arms (e.g., wheelchair, bedside chair)? 3  -LM     Climbing 3-5 steps with a railing? 3  -LM     To walk in hospital room? 3  -LM     AM-PAC 6 Clicks Score (PT) 18  -LM     Highest Level of Mobility Goal 6 --> Walk 10 steps or more  -LM       Row Name 07/13/24 1528 07/13/24 1527       Functional Assessment    Outcome Measure Options AM-PAC 6 Clicks Basic Mobility (PT)  -LM AM-PAC 6 Clicks Daily Activity (OT)  -AC              User Key  (r) = Recorded By, (t) = Taken By, (c) = Cosigned By      Initials Name  Provider Type     Amna Zapata, OT Occupational Therapist    Audra Parra, PT Physical Therapist                    Occupational Therapy Education       Title: PT OT SLP Therapies (In Progress)       Topic: Occupational Therapy (In Progress)       Point: ADL training (In Progress)       Description:   Instruct learner(s) on proper safety adaptation and remediation techniques during self care or transfers.   Instruct in proper use of assistive devices.                  Learning Progress Summary             Patient Acceptance, E, NR by  at 7/13/2024 3048                         Point: Home exercise program (Not Started)       Description:   Instruct learner(s) on appropriate technique for monitoring, assisting and/or progressing therapeutic exercises/activities.                  Learner Progress:  Not documented in this visit.              Point: Precautions (Not Started)       Description:   Instruct learner(s) on prescribed precautions during self-care and functional transfers.                  Learner Progress:  Not documented in this visit.              Point: Body mechanics (Not Started)       Description:   Instruct learner(s) on proper positioning and spine alignment during self-care, functional mobility activities and/or exercises.                  Learner Progress:  Not documented in this visit.                              User Key       Initials Effective Dates Name Provider Type Discipline     02/03/23 -  Amna Zapata, OT Occupational Therapist OT                  OT Recommendation and Plan  Planned Therapy Interventions (OT): activity tolerance training, BADL retraining, functional balance retraining, adaptive equipment training, occupation/activity based interventions, patient/caregiver education/training, strengthening exercise, transfer/mobility retraining  Therapy Frequency (OT): daily  Plan of Care Review  Plan of Care Reviewed With: patient  Outcome Evaluation: Pt presents below  baseline with self care/mobility d/t pain, weakness and decr activity tolerance.  Pt max A LBD, CGA toilet transfer,  CGA to ambulate in hallway with RW.  OT will follow to advance pt toward PLOF.  Recommend home with assist and HHOT.     Time Calculation:   Evaluation Complexity (OT)  Review Occupational Profile/Medical/Therapy History Complexity: brief/low complexity  Assessment, Occupational Performance/Identification of Deficit Complexity: 1-3 performance deficits  Clinical Decision Making Complexity (OT): problem focused assessment/low complexity  Overall Complexity of Evaluation (OT): low complexity     Time Calculation- OT       Row Name 07/13/24 1434             Time Calculation- OT    OT Start Time 1434  -AC      OT Received On 07/13/24  -AC      OT Goal Re-Cert Due Date 07/23/24  -AC         Untimed Charges    OT Eval/Re-eval Minutes 58  -AC         Total Minutes    Untimed Charges Total Minutes 58  -AC       Total Minutes 58  -AC                User Key  (r) = Recorded By, (t) = Taken By, (c) = Cosigned By      Initials Name Provider Type    AC Amna Zapata, OT Occupational Therapist                  Therapy Charges for Today       Code Description Service Date Service Provider Modifiers Qty    [id number]  OT EVAL LOW COMPLEXITY 4 7/13/2024 Amna Zapata OT GO 1                 Amna Zapata OT  7/13/2024

## 2024-07-13 NOTE — PLAN OF CARE
Goal Outcome Evaluation:  Plan of Care Reviewed With: patient           Outcome Evaluation: PT evaluation completed.  Pt stood and ambulated 140 feet using rw with CGAx1.  Pt presents below baseline function d/t weakness, pain, and decreased activity tolerance.  Recommend home with assist and  PT at d/c.      Anticipated Discharge Disposition (PT): home with assist, home with home health

## 2024-07-14 ENCOUNTER — APPOINTMENT (OUTPATIENT)
Dept: CARDIOLOGY | Facility: HOSPITAL | Age: 84
End: 2024-07-14
Payer: MEDICARE

## 2024-07-14 LAB
ANION GAP SERPL CALCULATED.3IONS-SCNC: 12 MMOL/L (ref 5–15)
BACTERIA SPEC AEROBE CULT: NORMAL
BH CV LOWER VASCULAR LEFT COMMON FEMORAL AUGMENT: NORMAL
BH CV LOWER VASCULAR LEFT COMMON FEMORAL COMPRESS: NORMAL
BH CV LOWER VASCULAR LEFT COMMON FEMORAL PHASIC: NORMAL
BH CV LOWER VASCULAR LEFT COMMON FEMORAL SPONT: NORMAL
BH CV LOWER VASCULAR LEFT DISTAL FEMORAL AUGMENT: NORMAL
BH CV LOWER VASCULAR LEFT DISTAL FEMORAL COMPRESS: NORMAL
BH CV LOWER VASCULAR LEFT DISTAL FEMORAL PHASIC: NORMAL
BH CV LOWER VASCULAR LEFT DISTAL FEMORAL SPONT: NORMAL
BH CV LOWER VASCULAR LEFT GASTRONEMIUS COMPRESS: NORMAL
BH CV LOWER VASCULAR LEFT GREATER SAPH AK COMPRESS: NORMAL
BH CV LOWER VASCULAR LEFT GREATER SAPH BK COMPRESS: NORMAL
BH CV LOWER VASCULAR LEFT LESSER SAPH COMPRESS: NORMAL
BH CV LOWER VASCULAR LEFT MID FEMORAL AUGMENT: NORMAL
BH CV LOWER VASCULAR LEFT MID FEMORAL COMPRESS: NORMAL
BH CV LOWER VASCULAR LEFT MID FEMORAL PHASIC: NORMAL
BH CV LOWER VASCULAR LEFT MID FEMORAL SPONT: NORMAL
BH CV LOWER VASCULAR LEFT PERONEAL COMPRESS: NORMAL
BH CV LOWER VASCULAR LEFT POPLITEAL AUGMENT: NORMAL
BH CV LOWER VASCULAR LEFT POPLITEAL COMPRESS: NORMAL
BH CV LOWER VASCULAR LEFT POPLITEAL PHASIC: NORMAL
BH CV LOWER VASCULAR LEFT POPLITEAL SPONT: NORMAL
BH CV LOWER VASCULAR LEFT POSTERIOR TIBIAL COMPRESS: NORMAL
BH CV LOWER VASCULAR LEFT PROXIMAL FEMORAL AUGMENT: NORMAL
BH CV LOWER VASCULAR LEFT PROXIMAL FEMORAL COMPRESS: NORMAL
BH CV LOWER VASCULAR LEFT PROXIMAL FEMORAL PHASIC: NORMAL
BH CV LOWER VASCULAR LEFT PROXIMAL FEMORAL SPONT: NORMAL
BH CV LOWER VASCULAR LEFT SAPHENOFEMORAL JUNCTION AUGMENT: NORMAL
BH CV LOWER VASCULAR LEFT SAPHENOFEMORAL JUNCTION COMPRESS: NORMAL
BH CV LOWER VASCULAR LEFT SAPHENOFEMORAL JUNCTION PHASIC: NORMAL
BH CV LOWER VASCULAR LEFT SAPHENOFEMORAL JUNCTION SPONT: NORMAL
BH CV LOWER VASCULAR RIGHT COMMON FEMORAL AUGMENT: NORMAL
BH CV LOWER VASCULAR RIGHT COMMON FEMORAL COMPRESS: NORMAL
BH CV LOWER VASCULAR RIGHT COMMON FEMORAL PHASIC: NORMAL
BH CV LOWER VASCULAR RIGHT COMMON FEMORAL SPONT: NORMAL
BH CV LOWER VASCULAR RIGHT DISTAL FEMORAL AUGMENT: NORMAL
BH CV LOWER VASCULAR RIGHT DISTAL FEMORAL COMPRESS: NORMAL
BH CV LOWER VASCULAR RIGHT DISTAL FEMORAL PHASIC: NORMAL
BH CV LOWER VASCULAR RIGHT DISTAL FEMORAL SPONT: NORMAL
BH CV LOWER VASCULAR RIGHT GASTRONEMIUS COMPRESS: NORMAL
BH CV LOWER VASCULAR RIGHT GREATER SAPH AK COMPRESS: NORMAL
BH CV LOWER VASCULAR RIGHT GREATER SAPH BK COMPRESS: NORMAL
BH CV LOWER VASCULAR RIGHT LESSER SAPH COMPRESS: NORMAL
BH CV LOWER VASCULAR RIGHT MID FEMORAL AUGMENT: NORMAL
BH CV LOWER VASCULAR RIGHT MID FEMORAL COMPRESS: NORMAL
BH CV LOWER VASCULAR RIGHT MID FEMORAL PHASIC: NORMAL
BH CV LOWER VASCULAR RIGHT MID FEMORAL SPONT: NORMAL
BH CV LOWER VASCULAR RIGHT PERONEAL COMPRESS: NORMAL
BH CV LOWER VASCULAR RIGHT POPLITEAL AUGMENT: NORMAL
BH CV LOWER VASCULAR RIGHT POPLITEAL COMPRESS: NORMAL
BH CV LOWER VASCULAR RIGHT POPLITEAL PHASIC: NORMAL
BH CV LOWER VASCULAR RIGHT POPLITEAL SPONT: NORMAL
BH CV LOWER VASCULAR RIGHT POSTERIOR TIBIAL COMPRESS: NORMAL
BH CV LOWER VASCULAR RIGHT PROXIMAL FEMORAL AUGMENT: NORMAL
BH CV LOWER VASCULAR RIGHT PROXIMAL FEMORAL COMPRESS: NORMAL
BH CV LOWER VASCULAR RIGHT PROXIMAL FEMORAL PHASIC: NORMAL
BH CV LOWER VASCULAR RIGHT PROXIMAL FEMORAL SPONT: NORMAL
BH CV LOWER VASCULAR RIGHT SAPHENOFEMORAL JUNCTION AUGMENT: NORMAL
BH CV LOWER VASCULAR RIGHT SAPHENOFEMORAL JUNCTION COMPRESS: NORMAL
BH CV LOWER VASCULAR RIGHT SAPHENOFEMORAL JUNCTION PHASIC: NORMAL
BH CV LOWER VASCULAR RIGHT SAPHENOFEMORAL JUNCTION SPONT: NORMAL
BUN SERPL-MCNC: 19 MG/DL (ref 8–23)
BUN/CREAT SERPL: 21.1 (ref 7–25)
CA-I SERPL ISE-MCNC: 1.22 MMOL/L (ref 1.12–1.32)
CALCIUM SPEC-SCNC: 8.2 MG/DL (ref 8.6–10.5)
CHLORIDE SERPL-SCNC: 101 MMOL/L (ref 98–107)
CO2 SERPL-SCNC: 26 MMOL/L (ref 22–29)
CREAT SERPL-MCNC: 0.9 MG/DL (ref 0.57–1)
DEPRECATED RDW RBC AUTO: 43.4 FL (ref 37–54)
EGFRCR SERPLBLD CKD-EPI 2021: 62.4 ML/MIN/1.73
ERYTHROCYTE [DISTWIDTH] IN BLOOD BY AUTOMATED COUNT: 13.1 % (ref 12.3–15.4)
GLUCOSE SERPL-MCNC: 114 MG/DL (ref 65–99)
HCT VFR BLD AUTO: 28.4 % (ref 34–46.6)
HCT VFR BLD AUTO: 29.9 % (ref 34–46.6)
HCT VFR BLD AUTO: 31.1 % (ref 34–46.6)
HGB BLD-MCNC: 9 G/DL (ref 12–15.9)
HGB BLD-MCNC: 9.4 G/DL (ref 12–15.9)
HGB BLD-MCNC: 9.9 G/DL (ref 12–15.9)
MCH RBC QN AUTO: 28.9 PG (ref 26.6–33)
MCHC RBC AUTO-ENTMCNC: 31.8 G/DL (ref 31.5–35.7)
MCV RBC AUTO: 90.7 FL (ref 79–97)
PLATELET # BLD AUTO: 412 10*3/MM3 (ref 140–450)
PMV BLD AUTO: 10 FL (ref 6–12)
POTASSIUM SERPL-SCNC: 3.3 MMOL/L (ref 3.5–5.2)
RBC # BLD AUTO: 3.43 10*6/MM3 (ref 3.77–5.28)
SODIUM SERPL-SCNC: 139 MMOL/L (ref 136–145)
WBC NRBC COR # BLD AUTO: 9.68 10*3/MM3 (ref 3.4–10.8)

## 2024-07-14 PROCEDURE — 85014 HEMATOCRIT: CPT | Performed by: NURSE PRACTITIONER

## 2024-07-14 PROCEDURE — 85027 COMPLETE CBC AUTOMATED: CPT | Performed by: FAMILY MEDICINE

## 2024-07-14 PROCEDURE — 93970 EXTREMITY STUDY: CPT

## 2024-07-14 PROCEDURE — 93970 EXTREMITY STUDY: CPT | Performed by: INTERNAL MEDICINE

## 2024-07-14 PROCEDURE — 82330 ASSAY OF CALCIUM: CPT | Performed by: INTERNAL MEDICINE

## 2024-07-14 PROCEDURE — 94664 DEMO&/EVAL PT USE INHALER: CPT

## 2024-07-14 PROCEDURE — 99232 SBSQ HOSP IP/OBS MODERATE 35: CPT | Performed by: FAMILY MEDICINE

## 2024-07-14 PROCEDURE — 94761 N-INVAS EAR/PLS OXIMETRY MLT: CPT

## 2024-07-14 PROCEDURE — 99232 SBSQ HOSP IP/OBS MODERATE 35: CPT | Performed by: NURSE PRACTITIONER

## 2024-07-14 PROCEDURE — 85018 HEMOGLOBIN: CPT | Performed by: NURSE PRACTITIONER

## 2024-07-14 PROCEDURE — 94799 UNLISTED PULMONARY SVC/PX: CPT

## 2024-07-14 PROCEDURE — 85018 HEMOGLOBIN: CPT | Performed by: FAMILY MEDICINE

## 2024-07-14 PROCEDURE — 25810000003 SODIUM CHLORIDE 0.9 % SOLUTION: Performed by: FAMILY MEDICINE

## 2024-07-14 PROCEDURE — 85014 HEMATOCRIT: CPT | Performed by: FAMILY MEDICINE

## 2024-07-14 PROCEDURE — 80048 BASIC METABOLIC PNL TOTAL CA: CPT | Performed by: FAMILY MEDICINE

## 2024-07-14 RX ORDER — OXYCODONE HYDROCHLORIDE 5 MG/1
5 TABLET ORAL EVERY 4 HOURS PRN
Status: DISCONTINUED | OUTPATIENT
Start: 2024-07-14 | End: 2024-07-15 | Stop reason: HOSPADM

## 2024-07-14 RX ORDER — POTASSIUM CHLORIDE 750 MG/1
40 CAPSULE, EXTENDED RELEASE ORAL ONCE
Status: COMPLETED | OUTPATIENT
Start: 2024-07-14 | End: 2024-07-14

## 2024-07-14 RX ORDER — POTASSIUM CHLORIDE 750 MG/1
40 CAPSULE, EXTENDED RELEASE ORAL DAILY
Status: DISCONTINUED | OUTPATIENT
Start: 2024-07-14 | End: 2024-07-14

## 2024-07-14 RX ORDER — AMLODIPINE BESYLATE 5 MG/1
5 TABLET ORAL
Status: DISCONTINUED | OUTPATIENT
Start: 2024-07-14 | End: 2024-07-15

## 2024-07-14 RX ADMIN — DORZOLAMIDE HYDROCHLORIDE AND TIMOLOL MALEATE 1 DROP: 20; 5 SOLUTION/ DROPS OPHTHALMIC at 21:07

## 2024-07-14 RX ADMIN — BUSPIRONE HYDROCHLORIDE 5 MG: 5 TABLET ORAL at 08:32

## 2024-07-14 RX ADMIN — DORZOLAMIDE HYDROCHLORIDE AND TIMOLOL MALEATE 1 DROP: 20; 5 SOLUTION/ DROPS OPHTHALMIC at 08:33

## 2024-07-14 RX ADMIN — IPRATROPIUM BROMIDE AND ALBUTEROL SULFATE 3 ML: 2.5; .5 SOLUTION RESPIRATORY (INHALATION) at 11:37

## 2024-07-14 RX ADMIN — BRIMONIDINE TARTRATE 1 DROP: 2 SOLUTION/ DROPS OPHTHALMIC at 08:33

## 2024-07-14 RX ADMIN — ACETAMINOPHEN 1000 MG: 500 TABLET ORAL at 21:07

## 2024-07-14 RX ADMIN — FERROUS SULFATE TAB 325 MG (65 MG ELEMENTAL FE) 325 MG: 325 (65 FE) TAB at 11:58

## 2024-07-14 RX ADMIN — ATORVASTATIN CALCIUM 10 MG: 10 TABLET, FILM COATED ORAL at 21:08

## 2024-07-14 RX ADMIN — AMLODIPINE BESYLATE 5 MG: 5 TABLET ORAL at 08:32

## 2024-07-14 RX ADMIN — ACETAMINOPHEN 1000 MG: 500 TABLET ORAL at 15:03

## 2024-07-14 RX ADMIN — OXYCODONE HYDROCHLORIDE 5 MG: 5 TABLET ORAL at 22:55

## 2024-07-14 RX ADMIN — BRIMONIDINE TARTRATE 1 DROP: 2 SOLUTION/ DROPS OPHTHALMIC at 21:07

## 2024-07-14 RX ADMIN — OXYCODONE HYDROCHLORIDE 5 MG: 5 TABLET ORAL at 18:41

## 2024-07-14 RX ADMIN — DOXYCYCLINE 100 MG: 100 CAPSULE ORAL at 21:08

## 2024-07-14 RX ADMIN — TRAZODONE HYDROCHLORIDE 50 MG: 50 TABLET ORAL at 21:07

## 2024-07-14 RX ADMIN — IPRATROPIUM BROMIDE AND ALBUTEROL SULFATE 3 ML: 2.5; .5 SOLUTION RESPIRATORY (INHALATION) at 14:53

## 2024-07-14 RX ADMIN — SODIUM CHLORIDE 50 ML/HR: 9 INJECTION, SOLUTION INTRAVENOUS at 18:29

## 2024-07-14 RX ADMIN — OXYCODONE HYDROCHLORIDE 5 MG: 5 TABLET ORAL at 15:03

## 2024-07-14 RX ADMIN — ACETAMINOPHEN 1000 MG: 500 TABLET ORAL at 08:32

## 2024-07-14 RX ADMIN — LATANOPROST 1 DROP: 50 SOLUTION OPHTHALMIC at 21:07

## 2024-07-14 RX ADMIN — OXYCODONE HYDROCHLORIDE 5 MG: 5 TABLET ORAL at 08:32

## 2024-07-14 RX ADMIN — DOXYCYCLINE 100 MG: 100 CAPSULE ORAL at 08:32

## 2024-07-14 RX ADMIN — IPRATROPIUM BROMIDE AND ALBUTEROL SULFATE 3 ML: 2.5; .5 SOLUTION RESPIRATORY (INHALATION) at 20:41

## 2024-07-14 RX ADMIN — Medication 10 ML: at 08:33

## 2024-07-14 RX ADMIN — POTASSIUM CHLORIDE 40 MEQ: 750 CAPSULE, EXTENDED RELEASE ORAL at 08:32

## 2024-07-14 RX ADMIN — Medication 10 ML: at 21:08

## 2024-07-14 RX ADMIN — BUSPIRONE HYDROCHLORIDE 5 MG: 5 TABLET ORAL at 21:07

## 2024-07-14 NOTE — PROGRESS NOTES
"  GI Daily Progress Note  Subjective:    Chief Complaint:  f/u melena     Patient doing well. No new or worsening complaints. No further bleeding. Hemoglobin stable. Tolerating diet.     Objective:    /75 (BP Location: Left arm, Patient Position: Sitting)   Pulse 86   Temp 97 °F (36.1 °C) (Oral)   Resp 18   Ht 152.4 cm (60\")   Wt 40.2 kg (88 lb 10 oz)   SpO2 95%   BMI 17.31 kg/m²     Physical Exam  Vitals and nursing note reviewed.   Constitutional:       General: She is not in acute distress.     Appearance: Normal appearance. She is normal weight. She is not ill-appearing or toxic-appearing.   Cardiovascular:      Rate and Rhythm: Normal rate and regular rhythm.      Pulses: Normal pulses.      Heart sounds: Normal heart sounds.   Pulmonary:      Effort: Pulmonary effort is normal. No respiratory distress.      Breath sounds: Normal breath sounds.   Abdominal:      General: Abdomen is flat. Bowel sounds are normal. There is no distension.      Palpations: Abdomen is soft. There is no mass.      Tenderness: There is no abdominal tenderness. There is no guarding or rebound.      Hernia: No hernia is present.   Neurological:      Mental Status: She is alert. Mental status is at baseline.         Lab  I have personally reviewed most recent cardiac tracings, lab results, and radiology images and interpretations and agree with findings.    Lab Results   Component Value Date    WBC 9.68 07/14/2024    HGB 9.9 (L) 07/14/2024    HGB 9.4 (L) 07/14/2024    HGB 9.4 (L) 07/13/2024    MCV 90.7 07/14/2024     07/14/2024    INR 1.08 07/13/2024       Lab Results   Component Value Date    GLUCOSE 114 (H) 07/14/2024    BUN 19 07/14/2024    CREATININE 0.90 07/14/2024    EGFRIFAFRI 55 01/29/2024    BCR 21.1 07/14/2024     07/14/2024    K 3.3 (L) 07/14/2024    CO2 26.0 07/14/2024    CALCIUM 8.2 (L) 07/14/2024    ALBUMIN 3.5 07/12/2024    ALKPHOS 150 (H) 07/12/2024    BILITOT 0.3 07/12/2024    ALT 10 07/12/2024 "    AST 16 07/12/2024       Assessment:  1.  Gastrointestinal bleeding with melena, s/p EGD on 07/13/2024  2.  Anemia, hemoglobin from 12.2 down to 10.7  3.  FOBT positive  4.  Epigastric abdominal pain  5.  RADHA on CKD.  6.  Leukocytosis, pyuria  7.  Vertebroplasty secondary to recent compression fractures  8.  AAA, history of surgical repair    Plan:  Patient doing well today. Hgb stable. No further reports of bleeding.     >>> Decrease serial H/H to q12hr  >>> Monitor and transfuse per protocol   >>> Pantoprazole 40 mg PO daily  >>> If re bleeding, consider colonoscopy     As hemoglobin stable and no further reports of bleeding, will sign off. Please contact GI with any further questions or concerns.     Munir Horan, DENISA  07/14/24  14:22 EDT

## 2024-07-14 NOTE — PLAN OF CARE
Goal Outcome Evaluation:    A/O x4- forgetful at times, RA, NSR, UWA x1-2. Encouraged to turn- pt refuses d/t continuous back pain. Pulled up in bed multiple times w/ no relief. DENISA Vail notified- n/o for Tylenol TID, Lidocaine patches to lower back and heating pad. Incontinence care provided- PW changed. Bed exit on. Refused AM labs.    Plan of Care Reviewed With: patient        Progress: no change       Problem: Adult Inpatient Plan of Care  Goal: Plan of Care Review  Outcome: Ongoing, Progressing  Flowsheets (Taken 7/14/2024 0416)  Progress: no change  Plan of Care Reviewed With: patient  Goal: Patient-Specific Goal (Individualized)  Outcome: Ongoing, Progressing  Goal: Absence of Hospital-Acquired Illness or Injury  Outcome: Ongoing, Progressing  Intervention: Identify and Manage Fall Risk  Recent Flowsheet Documentation  Taken 7/14/2024 0414 by Coleen Meza, RN  Safety Promotion/Fall Prevention:   assistive device/personal items within reach   clutter free environment maintained   room organization consistent   safety round/check completed  Taken 7/14/2024 0200 by Coleen Meza, RN  Safety Promotion/Fall Prevention:   activity supervised   assistive device/personal items within reach   clutter free environment maintained   nonskid shoes/slippers when out of bed   room organization consistent   safety round/check completed  Taken 7/14/2024 0030 by Coleen Meza, RN  Safety Promotion/Fall Prevention:   activity supervised   assistive device/personal items within reach   clutter free environment maintained   nonskid shoes/slippers when out of bed   room organization consistent   safety round/check completed  Taken 7/13/2024 2205 by Coleen Meza, RN  Safety Promotion/Fall Prevention:   activity supervised   assistive device/personal items within reach   clutter free environment maintained   nonskid shoes/slippers when out of bed   room organization consistent   safety round/check  completed  Taken 7/13/2024 2023 by Coleen Meza RN  Safety Promotion/Fall Prevention:   activity supervised   assistive device/personal items within reach   clutter free environment maintained   nonskid shoes/slippers when out of bed   room organization consistent   safety round/check completed  Intervention: Prevent Skin Injury  Recent Flowsheet Documentation  Taken 7/14/2024 0414 by Coleen Meza RN  Body Position: position changed independently  Skin Protection:   adhesive use limited   incontinence pads utilized   skin-to-skin areas padded   transparent dressing maintained   tubing/devices free from skin contact  Taken 7/14/2024 0200 by Coleen Meza RN  Body Position: position changed independently  Skin Protection:   adhesive use limited   incontinence pads utilized   skin-to-skin areas padded   transparent dressing maintained   tubing/devices free from skin contact  Taken 7/14/2024 0030 by Coleen Meza RN  Body Position: position changed independently  Skin Protection:   adhesive use limited   incontinence pads utilized   skin-to-skin areas padded   transparent dressing maintained   tubing/devices free from skin contact  Taken 7/13/2024 2205 by Coleen Meza RN  Body Position:   position changed independently   patient/family refused  Skin Protection:   adhesive use limited   incontinence pads utilized   skin-to-skin areas padded   transparent dressing maintained   tubing/devices free from skin contact  Taken 7/13/2024 2023 by Coleen Meza RN  Body Position: position changed independently  Skin Protection:   adhesive use limited   incontinence pads utilized   skin-to-skin areas padded   transparent dressing maintained   tubing/devices free from skin contact  Intervention: Prevent and Manage VTE (Venous Thromboembolism) Risk  Recent Flowsheet Documentation  Taken 7/14/2024 0414 by Coleen Meza RN  Activity Management: activity encouraged  Taken 7/14/2024 0200 by  Coleen Meza RN  Activity Management: activity encouraged  Taken 7/14/2024 0030 by Coleen Meza RN  Activity Management: activity encouraged  Taken 7/13/2024 2205 by Coleen Meza RN  Activity Management: activity encouraged  Taken 7/13/2024 2023 by Coleen Meza RN  Activity Management: activity encouraged  VTE Prevention/Management:   bilateral   sequential compression devices off   patient refused intervention  Range of Motion: active ROM (range of motion) encouraged  Intervention: Prevent Infection  Recent Flowsheet Documentation  Taken 7/14/2024 0414 by Coleen Meza RN  Infection Prevention:   environmental surveillance performed   rest/sleep promoted  Taken 7/14/2024 0200 by Coleen Meza RN  Infection Prevention:   environmental surveillance performed   rest/sleep promoted  Taken 7/14/2024 0030 by Coleen Meza RN  Infection Prevention:   environmental surveillance performed   rest/sleep promoted  Taken 7/13/2024 2205 by Coleen Meza RN  Infection Prevention:   environmental surveillance performed   rest/sleep promoted  Taken 7/13/2024 2023 by Coleen Meza RN  Infection Prevention:   environmental surveillance performed   cohorting utilized   equipment surfaces disinfected   hand hygiene promoted   rest/sleep promoted  Goal: Optimal Comfort and Wellbeing  Outcome: Ongoing, Progressing  Intervention: Provide Person-Centered Care  Recent Flowsheet Documentation  Taken 7/13/2024 2023 by Coleen Meza RN  Trust Relationship/Rapport:   care explained   choices provided   questions answered   thoughts/feelings acknowledged  Goal: Readiness for Transition of Care  Outcome: Ongoing, Progressing     Problem: Fall Injury Risk  Goal: Absence of Fall and Fall-Related Injury  Outcome: Ongoing, Progressing  Intervention: Identify and Manage Contributors  Recent Flowsheet Documentation  Taken 7/14/2024 0414 by Coleen Meza RN  Medication  Review/Management: medications reviewed  Self-Care Promotion:   independence encouraged   BADL personal objects within reach  Taken 7/14/2024 0200 by Coleen Meza RN  Medication Review/Management: medications reviewed  Self-Care Promotion:   independence encouraged   BADL personal objects within reach  Taken 7/14/2024 0030 by Coleen Meza RN  Medication Review/Management: medications reviewed  Self-Care Promotion:   independence encouraged   BADL personal objects within reach  Taken 7/13/2024 2205 by Coleen Meza RN  Medication Review/Management: medications reviewed  Self-Care Promotion:   independence encouraged   BADL personal objects within reach  Taken 7/13/2024 2023 by Coleen Meza RN  Medication Review/Management: medications reviewed  Self-Care Promotion:   independence encouraged   BADL personal objects within reach  Intervention: Promote Injury-Free Environment  Recent Flowsheet Documentation  Taken 7/14/2024 0414 by Coleen Meza RN  Safety Promotion/Fall Prevention:   assistive device/personal items within reach   clutter free environment maintained   room organization consistent   safety round/check completed  Taken 7/14/2024 0200 by Coleen Meza RN  Safety Promotion/Fall Prevention:   activity supervised   assistive device/personal items within reach   clutter free environment maintained   nonskid shoes/slippers when out of bed   room organization consistent   safety round/check completed  Taken 7/14/2024 0030 by Coleen Meza RN  Safety Promotion/Fall Prevention:   activity supervised   assistive device/personal items within reach   clutter free environment maintained   nonskid shoes/slippers when out of bed   room organization consistent   safety round/check completed  Taken 7/13/2024 2205 by Coleen Meza RN  Safety Promotion/Fall Prevention:   activity supervised   assistive device/personal items within reach   clutter free environment  maintained   nonskid shoes/slippers when out of bed   room organization consistent   safety round/check completed  Taken 7/13/2024 2023 by Coleen Meza RN  Safety Promotion/Fall Prevention:   activity supervised   assistive device/personal items within reach   clutter free environment maintained   nonskid shoes/slippers when out of bed   room organization consistent   safety round/check completed     Problem: Skin Injury Risk Increased  Goal: Skin Health and Integrity  Outcome: Ongoing, Progressing  Intervention: Optimize Skin Protection  Recent Flowsheet Documentation  Taken 7/14/2024 0414 by Coleen Meza RN  Pressure Reduction Techniques: frequent weight shift encouraged  Head of Bed (HOB) Positioning: [facility] at 30-45 degrees  Pressure Reduction Devices: pressure-redistributing mattress utilized  Skin Protection:   adhesive use limited   incontinence pads utilized   skin-to-skin areas padded   transparent dressing maintained   tubing/devices free from skin contact  Taken 7/14/2024 0200 by Coleen Meza RN  Pressure Reduction Techniques:   frequent weight shift encouraged   weight shift assistance provided  Head of Bed (HOB) Positioning: [facility] elevated  Pressure Reduction Devices: pressure-redistributing mattress utilized  Skin Protection:   adhesive use limited   incontinence pads utilized   skin-to-skin areas padded   transparent dressing maintained   tubing/devices free from skin contact  Taken 7/14/2024 0030 by Coleen Meza RN  Pressure Reduction Techniques: frequent weight shift encouraged  Head of Bed (HOB) Positioning: HOB at 30-45 degrees  Pressure Reduction Devices: pressure-redistributing mattress utilized  Skin Protection:   adhesive use limited   incontinence pads utilized   skin-to-skin areas padded   transparent dressing maintained   tubing/devices free from skin contact  Taken 7/13/2024 2205 by Coleen Meza RN  Pressure Reduction Techniques: frequent weight shift  encouraged  Head of Bed (HOB) Positioning: HOB at 30-45 degrees  Pressure Reduction Devices: pressure-redistributing mattress utilized  Skin Protection:   adhesive use limited   incontinence pads utilized   skin-to-skin areas padded   transparent dressing maintained   tubing/devices free from skin contact  Taken 7/13/2024 2023 by Coleen Meza RN  Pressure Reduction Techniques:   frequent weight shift encouraged   weight shift assistance provided  Head of Bed (HOB) Positioning: HOB at 30-45 degrees  Pressure Reduction Devices: pressure-redistributing mattress utilized  Skin Protection:   adhesive use limited   incontinence pads utilized   skin-to-skin areas padded   transparent dressing maintained   tubing/devices free from skin contact     Problem: Adjustment to Illness (Sepsis/Septic Shock)  Goal: Optimal Coping  Outcome: Ongoing, Progressing  Intervention: Optimize Psychosocial Adjustment to Illness  Recent Flowsheet Documentation  Taken 7/13/2024 2023 by Coleen Meza RN  Family/Support System Care:   support provided   self-care encouraged     Problem: Bleeding (Sepsis/Septic Shock)  Goal: Absence of Bleeding  Outcome: Ongoing, Progressing     Problem: Glycemic Control Impaired (Sepsis/Septic Shock)  Goal: Blood Glucose Level Within Desired Range  Outcome: Ongoing, Progressing     Problem: Infection Progression (Sepsis/Septic Shock)  Goal: Absence of Infection Signs and Symptoms  Outcome: Ongoing, Progressing  Intervention: Initiate Sepsis Management  Recent Flowsheet Documentation  Taken 7/14/2024 0414 by Coleen Meza RN  Infection Prevention:   environmental surveillance performed   rest/sleep promoted  Taken 7/14/2024 0200 by Coleen Meza RN  Infection Prevention:   environmental surveillance performed   rest/sleep promoted  Taken 7/14/2024 0030 by Coleen Meza RN  Infection Prevention:   environmental surveillance performed   rest/sleep promoted  Taken 7/13/2024 2205 by  Coleen Meza, RN  Infection Prevention:   environmental surveillance performed   rest/sleep promoted  Taken 7/13/2024 2023 by Coleen Meza, RN  Infection Prevention:   environmental surveillance performed   cohorting utilized   equipment surfaces disinfected   hand hygiene promoted   rest/sleep promoted  Intervention: Promote Recovery  Recent Flowsheet Documentation  Taken 7/14/2024 0414 by Coleen Meza, RN  Activity Management: activity encouraged  Taken 7/14/2024 0200 by Coleen Meza, RN  Activity Management: activity encouraged  Taken 7/14/2024 0030 by Coleen Meza, RN  Activity Management: activity encouraged  Taken 7/13/2024 2205 by Coleen Meza, RN  Activity Management: activity encouraged  Taken 7/13/2024 2023 by Coleen Meza, RN  Activity Management: activity encouraged     Problem: Nutrition Impaired (Sepsis/Septic Shock)  Goal: Optimal Nutrition Intake  Outcome: Ongoing, Progressing

## 2024-07-15 ENCOUNTER — APPOINTMENT (OUTPATIENT)
Dept: NUCLEAR MEDICINE | Facility: HOSPITAL | Age: 84
End: 2024-07-15
Payer: MEDICARE

## 2024-07-15 ENCOUNTER — READMISSION MANAGEMENT (OUTPATIENT)
Dept: CALL CENTER | Facility: HOSPITAL | Age: 84
End: 2024-07-15
Payer: MEDICARE

## 2024-07-15 VITALS
SYSTOLIC BLOOD PRESSURE: 138 MMHG | TEMPERATURE: 97 F | RESPIRATION RATE: 18 BRPM | WEIGHT: 87.74 LBS | BODY MASS INDEX: 16.57 KG/M2 | HEIGHT: 61 IN | HEART RATE: 72 BPM | OXYGEN SATURATION: 94 % | DIASTOLIC BLOOD PRESSURE: 92 MMHG

## 2024-07-15 LAB
ANION GAP SERPL CALCULATED.3IONS-SCNC: 12 MMOL/L (ref 5–15)
BUN SERPL-MCNC: 12 MG/DL (ref 8–23)
BUN/CREAT SERPL: 13.2 (ref 7–25)
CALCIUM SPEC-SCNC: 8.4 MG/DL (ref 8.6–10.5)
CHLORIDE SERPL-SCNC: 102 MMOL/L (ref 98–107)
CO2 SERPL-SCNC: 23 MMOL/L (ref 22–29)
CREAT SERPL-MCNC: 0.91 MG/DL (ref 0.57–1)
DEPRECATED RDW RBC AUTO: 44 FL (ref 37–54)
EGFRCR SERPLBLD CKD-EPI 2021: 61.6 ML/MIN/1.73
ERYTHROCYTE [DISTWIDTH] IN BLOOD BY AUTOMATED COUNT: 13 % (ref 12.3–15.4)
GLUCOSE SERPL-MCNC: 121 MG/DL (ref 65–99)
HCT VFR BLD AUTO: 29.3 % (ref 34–46.6)
HGB BLD-MCNC: 9.2 G/DL (ref 12–15.9)
MCH RBC QN AUTO: 29.1 PG (ref 26.6–33)
MCHC RBC AUTO-ENTMCNC: 31.4 G/DL (ref 31.5–35.7)
MCV RBC AUTO: 92.7 FL (ref 79–97)
PLATELET # BLD AUTO: 352 10*3/MM3 (ref 140–450)
PMV BLD AUTO: 9.6 FL (ref 6–12)
POTASSIUM SERPL-SCNC: 4.1 MMOL/L (ref 3.5–5.2)
RBC # BLD AUTO: 3.16 10*6/MM3 (ref 3.77–5.28)
SODIUM SERPL-SCNC: 137 MMOL/L (ref 136–145)
WBC NRBC COR # BLD AUTO: 10.14 10*3/MM3 (ref 3.4–10.8)

## 2024-07-15 PROCEDURE — 0 TECHNETIUM ALBUMIN AGGREGATED: Performed by: INTERNAL MEDICINE

## 2024-07-15 PROCEDURE — 78580 LUNG PERFUSION IMAGING: CPT

## 2024-07-15 PROCEDURE — 85027 COMPLETE CBC AUTOMATED: CPT | Performed by: INTERNAL MEDICINE

## 2024-07-15 PROCEDURE — A9540 TC99M MAA: HCPCS | Performed by: INTERNAL MEDICINE

## 2024-07-15 PROCEDURE — 94799 UNLISTED PULMONARY SVC/PX: CPT

## 2024-07-15 PROCEDURE — 80048 BASIC METABOLIC PNL TOTAL CA: CPT | Performed by: INTERNAL MEDICINE

## 2024-07-15 PROCEDURE — 99239 HOSP IP/OBS DSCHRG MGMT >30: CPT | Performed by: INTERNAL MEDICINE

## 2024-07-15 PROCEDURE — 94761 N-INVAS EAR/PLS OXIMETRY MLT: CPT

## 2024-07-15 PROCEDURE — 94664 DEMO&/EVAL PT USE INHALER: CPT

## 2024-07-15 RX ORDER — ASPIRIN 81 MG/1
81 TABLET ORAL DAILY
COMMUNITY
End: 2024-07-15 | Stop reason: HOSPADM

## 2024-07-15 RX ORDER — LOSARTAN POTASSIUM 25 MG/1
25 TABLET ORAL
Status: DISCONTINUED | OUTPATIENT
Start: 2024-07-15 | End: 2024-07-15 | Stop reason: HOSPADM

## 2024-07-15 RX ORDER — PANTOPRAZOLE SODIUM 40 MG/1
40 TABLET, DELAYED RELEASE ORAL
Status: DISCONTINUED | OUTPATIENT
Start: 2024-07-15 | End: 2024-07-15 | Stop reason: HOSPADM

## 2024-07-15 RX ORDER — OXYCODONE HYDROCHLORIDE 5 MG/1
5 TABLET ORAL EVERY 8 HOURS PRN
Qty: 12 TABLET | Refills: 0 | Status: SHIPPED | OUTPATIENT
Start: 2024-07-15 | End: 2024-07-19

## 2024-07-15 RX ORDER — POLYETHYLENE GLYCOL 3350 17 G/17G
17 POWDER, FOR SOLUTION ORAL DAILY
Qty: 510 G | Refills: 0 | Status: SHIPPED | OUTPATIENT
Start: 2024-07-15

## 2024-07-15 RX ADMIN — IPRATROPIUM BROMIDE AND ALBUTEROL SULFATE 3 ML: 2.5; .5 SOLUTION RESPIRATORY (INHALATION) at 13:01

## 2024-07-15 RX ADMIN — BRIMONIDINE TARTRATE 1 DROP: 2 SOLUTION/ DROPS OPHTHALMIC at 08:52

## 2024-07-15 RX ADMIN — DOXYCYCLINE 100 MG: 100 CAPSULE ORAL at 08:50

## 2024-07-15 RX ADMIN — ACETAMINOPHEN 1000 MG: 500 TABLET ORAL at 08:50

## 2024-07-15 RX ADMIN — BUSPIRONE HYDROCHLORIDE 5 MG: 5 TABLET ORAL at 08:50

## 2024-07-15 RX ADMIN — IPRATROPIUM BROMIDE AND ALBUTEROL SULFATE 3 ML: 2.5; .5 SOLUTION RESPIRATORY (INHALATION) at 07:25

## 2024-07-15 RX ADMIN — OXYCODONE HYDROCHLORIDE 5 MG: 5 TABLET ORAL at 08:50

## 2024-07-15 RX ADMIN — DORZOLAMIDE HYDROCHLORIDE AND TIMOLOL MALEATE 1 DROP: 20; 5 SOLUTION/ DROPS OPHTHALMIC at 08:52

## 2024-07-15 RX ADMIN — Medication 10 ML: at 08:51

## 2024-07-15 RX ADMIN — LOSARTAN POTASSIUM 25 MG: 25 TABLET, FILM COATED ORAL at 08:50

## 2024-07-15 RX ADMIN — FERROUS SULFATE TAB 325 MG (65 MG ELEMENTAL FE) 325 MG: 325 (65 FE) TAB at 12:57

## 2024-07-15 RX ADMIN — OXYCODONE HYDROCHLORIDE 5 MG: 5 TABLET ORAL at 12:56

## 2024-07-15 RX ADMIN — OXYCODONE HYDROCHLORIDE 5 MG: 5 TABLET ORAL at 02:55

## 2024-07-15 RX ADMIN — KIT FOR THE PREPARATION OF TECHNETIUM TC 99M ALBUMIN AGGREGATED 1 DOSE: 2.5 INJECTION, POWDER, FOR SOLUTION INTRAVENOUS at 11:00

## 2024-07-15 RX ADMIN — PANTOPRAZOLE SODIUM 40 MG: 40 TABLET, DELAYED RELEASE ORAL at 08:50

## 2024-07-15 NOTE — CASE MANAGEMENT/SOCIAL WORK
Discharge Planning Assessment  [facility]     Patient Name: Mariia Roberson  MRN: [id number]  Today's Date: 7/15/2024    Admit Date: 7/12/2024    Plan: home with home health   Discharge Needs Assessment       Row Name 07/15/24 1021       Living Environment    People in Home child(lionel), adult    Current Living Arrangements home    Potentially Unsafe Housing Conditions none    In the past 12 months has the electric, gas, oil, or water company threatened to shut off services in your home? No    Primary Care Provided by self    Provides Primary Care For no one    Family Caregiver if Needed child(lionel), adult    Quality of Family Relationships helpful;supportive    Able to Return to Prior Arrangements yes       Resource/Environmental Concerns    Resource/Environmental Concerns none    Transportation Concerns none       Transportation Needs    In the past 12 months, has lack of transportation kept you from medical appointments or from getting medications? no    In the past 12 months, has lack of transportation kept you from meetings, work, or from getting things needed for daily living? No       Food Insecurity    Within the past 12 months, you worried that your food would run out before you got the money to buy more. Never true    Within the past 12 months, the food you bought just didn't last and you didn't have money to get more. Never true       Transition Planning    Patient/Family Anticipates Transition to home with family    Patient/Family Anticipated Services at Transition home health care    Transportation Anticipated family or friend will provide       Discharge Needs Assessment    Readmission Within the Last 30 Days current reason for admission unrelated to previous admission    Equipment Currently Used at Home walker, rolling;shower chair;wheelchair;cane, quad tip    Concerns to be Addressed discharge planning    Anticipated Changes Related to Illness none    Equipment Needed After Discharge none     Discharge Facility/Level of Care Needs home with home health                   Discharge Plan       Row Name 07/15/24 1024       Plan    Plan home with home health    Patient/Family in Agreement with Plan yes    Plan Comments Met with Ms. Roberson at the bedside to initiate discharge plan. She lives in her daughter's home with her own living area. She is independent with activiities of daily living and uses a rolling walker for mobility. She also has a quad cane and wheelchair. She has had [facility] recently, and therapy is recommending home health PT and OT at discharge. Patient reported she wants [facility] again.  called [facility] Magdalena mahan, and made new referral. Her primary care provider is DENISA Levy. She denies problems with accessing medical care or obtaining medications. Her plan is home with [facility], and family will transport. She will need new home health orders.  will continue to follow plan of care and assist with discharge planning needs as indicated.    Final Discharge Disposition Code 06 - home with home health care                  Continued Care and Services - Admitted Since 7/12/2024    No active coordination exists for this encounter.       Selected Continued Care - Prior Encounters Includes continued care and service providers with selected services from prior encounters from 4/13/2024 to 7/15/2024      Discharged on 7/3/2024 Admission date: 6/28/2024 - Discharge disposition: [place]Health Care [facility] [place]      Home Medical Care       Service Provider Selected Services Address Phone Fax Patient Preferred    [facility] ,  Home Medical  ,  Home Nursing [address], [address] [id number] [phone] --                          Expected Discharge Date and Time       Expected Discharge Date Expected Discharge Time    Jul 14, 2024             Demographic Summary       Row Name 07/15/24 1019       General Information    Admission Type inpatient    Arrived From home    Referral Source admission list    Reason for Consult discharge planning    Preferred Language English       Contact Information    Permission Granted to Share Info With family/designee    Contact Information Obtained for     Contact Information Comments RENETTA RODRIGUES(POA) Daughter [phone]                   Functional Status       Row Name 07/15/24 1020       Functional Status    Usual Activity Tolerance good    Current Activity Tolerance other (see comments)  see therapy notes       Physical Activity    On average, how many days per week do you engage in moderate to strenuous exercise (like a brisk walk)? 0 days    On average, how many minutes do you engage in exercise at this level? 0 min    Number of minutes of exercise per week 0       Assessment of Health Literacy    How often do you have someone help you read hospital materials? Occasionally    How often do you have problems learning about your medical condition because of difficulty understanding written information? Occasionally    How often do you have a problem understanding what is told to you about your medical condition? Occasionally    How confident are you filling out medical forms by yourself? Quite a bit    Health Literacy Good       Functional Status, IADL    Medications independent    Meal Preparation assistive person    Housekeeping assistive person    Laundry assistive person    Shopping assistive person       Mental Status    General Appearance WDL WDL       Mental Status Summary    Recent Changes in Mental Status/Cognitive Functioning unable to assess                   Psychosocial    No documentation.                  Abuse/Neglect    No documentation.                  Legal    No documentation.                  Substance Abuse    No documentation.                  Patient Forms    No documentation.                      Nichelle Valente RN

## 2024-07-15 NOTE — PLAN OF CARE
Goal Outcome Evaluation:    A/O x4- forgetful at times. RA, VSS, NSR. C/o pain back- scheduled Tylenol and PRN pain medication given with no pain relief. Offered to reposition and pt declined- only wanted to be pulled up in bed at times. Sat in chair for a couple hours and it seemed to help with pain. UWA to BSC w/ walker. No painful urination. No c/o nausea. Bed exit on.    Plan of Care Reviewed With: patient        Progress: no change         Problem: Fall Injury Risk  Goal: Absence of Fall and Fall-Related Injury  Outcome: Ongoing, Progressing  Intervention: Identify and Manage Contributors  Recent Flowsheet Documentation  Taken 7/15/2024 0400 by Coleen Meza, RN  Medication Review/Management: medications reviewed  Self-Care Promotion:   independence encouraged   BADL personal objects within reach  Taken 7/15/2024 0234 by Coleen Meza, RN  Medication Review/Management: medications reviewed  Self-Care Promotion:   independence encouraged   BADL personal objects within reach  Taken 7/15/2024 0008 by Coelen Meza RN  Medication Review/Management: medications reviewed  Self-Care Promotion:   independence encouraged   BADL personal objects within reach  Taken 7/14/2024 2243 by Coleen Meza RN  Medication Review/Management: medications reviewed  Self-Care Promotion:   independence encouraged   BADL personal objects within reach  Taken 7/14/2024 2002 by Coleen Meza RN  Medication Review/Management: medications reviewed  Self-Care Promotion:   independence encouraged   BADL personal objects within reach  Intervention: Promote Injury-Free Environment  Recent Flowsheet Documentation  Taken 7/15/2024 0400 by Coleen Meza, RN  Safety Promotion/Fall Prevention:   assistive device/personal items within reach   clutter free environment maintained   room organization consistent   safety round/check completed  Taken 7/15/2024 0234 by Coleen Meza, RN  Safety Promotion/Fall  Prevention:   assistive device/personal items within reach   clutter free environment maintained   room organization consistent   safety round/check completed  Taken 7/15/2024 0008 by Coleen Meza, RN  Safety Promotion/Fall Prevention:   assistive device/personal items within reach   clutter free environment maintained   room organization consistent   safety round/check completed  Taken 7/14/2024 2243 by Coleen Meza, RN  Safety Promotion/Fall Prevention:   assistive device/personal items within reach   clutter free environment maintained   room organization consistent   safety round/check completed  Taken 7/14/2024 2002 by Cloeen Meza, RN  Safety Promotion/Fall Prevention:   activity supervised   assistive device/personal items within reach   clutter free environment maintained   nonskid shoes/slippers when out of bed   room organization consistent   safety round/check completed

## 2024-07-15 NOTE — DISCHARGE SUMMARY
[facility] Medicine Services  DISCHARGE SUMMARY    Patient Name: Mariia Roberson  : 1940  MRN: [id number]    Date of Admission: 2024  4:35 PM  Date of Discharge:  7/15/2024  Primary Care Physician: Susan Waters APRN    Consults       Date and Time Order Name Status Description    2024  8:37 PM Inpatient Gastroenterology Consult Completed     2024  3:18 PM Inpatient Neurosurgery Consult Completed             Hospital Course     Presenting Problem: melena    Active Hospital Problems    Diagnosis  POA    **Melena [K92.1]  Yes    Pyuria [R82.81]  Yes    Leukocytosis [D72.829]  Yes    GIB (gastrointestinal bleeding) [K92.2]  Yes    Acute blood loss anemia [D62]  Yes    Cough [R05.9]  Yes    AAA (abdominal aortic aneurysm) [I71.40]  Yes    Previous back surgery [Z98.890]  Not Applicable    CHF (congestive heart failure) [I50.9]  Yes    Acute kidney injury [N17.9]  Yes    Anxiety [F41.9]  Yes    HTN (hypertension) [I10]  Yes      Resolved Hospital Problems   No resolved problems to display.          Hospital Course:  Mariia Roberson is a 84 y.o. female w h/o compression fracture s/p recent vertebroplasty on 2024 who presented to ED w melena. Hbg 12 --> 9. This was evaluated with EGD which was normal. H&H stabilized and stool began to be light brown in color reassuringly. Patient was on ASA 81 mg, per report this was for primary prophylaxis but may have been for remote h/o for retinal a occlusion w blindness -- she denies h/o classic CVA or MI. Therefore will hold for now. If H&H stabilizes, can consider risk/benefit of restarting with PCP.    Mild RADHA on admission w quick improvement w IVF. Hold lasix until PCP follow-up, OK to resume ARB given quick return to baseline.    Small cough w diagnosis of URI, underlying emphysema. On doxycycline already as OP which was continued. Work-up revealed very mildly elev D-dimer (for age, could also be within normal range)  which was pursued w NM scan. This was equivocal with underlying lung dz (known emphysema) versus pulmonary emboli. I d/w patient in detail for some time - no pulmonary embolism symptoms at all, no dyspnea, no chest pain, no pleuritic pain, no h/o clot. Appears pre-test probability same as baseline population and obviously bleeding risk high. Discussed this with patient and also with her daughter - all agree not to pursue further for now given bleeding risk, CrCl 27 and lack of symptoms. Told them symptoms that would require repeat evaluation.    Persistent back pain post recent vertebroplasty. Follow-up outpatient w NSGY, provided oxycodone until PCP follow-up    Incidental finding of AAA - d/w CT surgery and OP evaluation recommended. Referral placed    Discharge Follow Up Recommendations for outpatient labs/diagnostics:   F/u PCP Friday w repeat CBC, hold lasix until this follow-up and can restart w them as appropriate   CT surgery referral placed for AAA incidental finding    Day of Discharge     HPI:   Asymptomatic.  Very anxious to return home today.   Feels very well      Vital Signs:   Temp:  [97.4 °F (36.3 °C)-98.6 °F (37 °C)] 97.6 °F (36.4 °C)  Heart Rate:  [68-94] 71  Resp:  [16-18] 16  BP: (101-165)/() 164/112      Physical Exam:  Constitutional: No acute distress, awake, alert frail thin female sitting up in recliner working on [place]  HENT: NCAT, mucous membranes moist  Respiratory: Clear to auscultation bilaterally, respiratory effort normal   Cardiovascular: RRR, no murmurs, rubs, or gallops  Gastrointestinal: Soft, nontender, nondistended  Musculoskeletal: Muscle tone decreased throughout, no joint effusions appreciated  Psychiatric: Appropriate affect, cooperative  Neurologic: Alert and oriented, facial movements symmetric and spontaneous movement of all 4 extremities grossly equal bilaterally, speech clear  Skin: No rashes    Pertinent  and/or Most Recent Results     LAB RESULTS:       Lab 07/15/24  0953 07/14/24  1942 07/14/24  0704 07/14/24  0043 07/13/24 2026 07/13/24  1047 07/13/24  0459 07/12/24  2318 07/12/24  1704   WBC 10.14  --  9.68  --   --   --  13.72*  --  16.20*   HEMOGLOBIN 9.2* 9.0* 9.9* 9.4* 9.4*   < > 10.2*  10.2*   < > 10.7*   HEMATOCRIT 29.3* 28.4* 31.1* 29.9* 29.8*   < > 31.7*  31.7*   < > 33.8*   PLATELETS 352  --  412  --   --   --  388  --  457*   NEUTROS ABS  --   --   --   --   --   --  9.83*  --  11.45*   IMMATURE GRANS (ABS)  --   --   --   --   --   --  0.07*  --  0.07*   LYMPHS ABS  --   --   --   --   --   --  2.90  --  3.60*   MONOS ABS  --   --   --   --   --   --  0.74  --  0.84   EOS ABS  --   --   --   --   --   --  0.12  --  0.17   MCV 92.7  --  90.7  --   --   --  89.5  --  92.6   SED RATE  --   --   --   --   --   --   --   --  73*   CRP  --   --   --   --   --   --   --   --  7.63*   PROCALCITONIN  --   --   --   --   --   --   --   --  0.07   LACTATE  --   --   --   --   --   --   --   --  1.5   PROTIME  --   --   --   --   --   --  14.1  --   --    APTT  --   --   --   --   --   --  32.6  --   --    D DIMER QUANT  --   --   --   --   --   --   --   --  1.11*    < > = values in this interval not displayed.         Lab 07/15/24  0953 07/14/24  0704 07/13/24  1532 07/13/24  0459 07/12/24  1715 07/12/24  1704   SODIUM 137 139 136 134*  --  138   POTASSIUM 4.1 3.3* 4.0 3.6  --  3.6   CHLORIDE 102 101 98 94*  --  97*   CO2 23.0 26.0 25.0 28.0  --  31.0*   ANION GAP 12.0 12.0 13.0 12.0  --  10.0   BUN 12 19 21 18  --  20   CREATININE 0.91 0.90 1.09* 1.01* 1.40* 1.32*   EGFR 61.6 62.4 49.6* 54.3*  --  39.4*   GLUCOSE 121* 114* 123* 114*  --  131*   CALCIUM 8.4* 8.2* 8.4* 8.5*  --  9.1   IONIZED CALCIUM  --  1.22  --   --   --   --    MAGNESIUM  --   --   --  1.6  --   --          Lab 07/12/24  1704   TOTAL PROTEIN 7.3   ALBUMIN 3.5   GLOBULIN 3.8   ALT (SGPT) 10   AST (SGOT) 16   BILIRUBIN 0.3   ALK PHOS 150*         Lab 07/13/24  0459 07/12/24  1704   PROBNP   --  970.8   PROTIME 14.1  --    INR 1.08  --              Lab 07/12/24  1853 07/12/24  1736   ABO TYPING A A   RH TYPING Positive Positive   ANTIBODY SCREEN  --  Negative         Brief Urine Lab Results  (Last result in the past 365 days)        Color   Clarity   Blood   Leuk Est   Nitrite   Protein   CREAT   Urine HCG        07/12/24 1640             94.1         07/12/24 1640 Yellow   Cloudy   Moderate (2+)   Moderate (2+)   Negative   100 mg/dL (2+)                 Microbiology Results (last 10 days)       Procedure Component Value - Date/Time    Respiratory Panel PCR w/COVID-19(SARS-CoV-2) [facility] In-House, NP Swab in UTM/VTM, 2 HR TAT - Swab, Nasopharynx [[id number]]  (Normal) Collected: 07/12/24 1738    Lab Status: Final result Specimen: Swab from Nasopharynx Updated: 07/1940     ADENOVIRUS, PCR Not Detected     Coronavirus 229E Not Detected     Coronavirus HKU1 Not Detected     Coronavirus NL63 Not Detected     Coronavirus OC43 Not Detected     COVID19 Not Detected     Human Metapneumovirus Not Detected     Human Rhinovirus/Enterovirus Not Detected     Influenza A PCR Not Detected     Influenza B PCR Not Detected     Parainfluenza Virus 1 Not Detected     Parainfluenza Virus 2 Not Detected     Parainfluenza Virus 3 Not Detected     Parainfluenza Virus 4 Not Detected     RSV, PCR Not Detected     Bordetella pertussis pcr Not Detected     Bordetella parapertussis PCR Not Detected     Chlamydophila pneumoniae PCR Not Detected     Mycoplasma pneumo by PCR Not Detected    Narrative:      In the setting of a positive respiratory panel with a viral infection PLUS a negative procalcitonin without other underlying concern for bacterial infection, consider observing off antibiotics or discontinuation of antibiotics and continue supportive care. If the respiratory panel is positive for atypical bacterial infection (Bordetella pertussis, Chlamydophila pneumoniae, or Mycoplasma pneumoniae), consider  antibiotic de-escalation to target atypical bacterial infection.    Blood Culture - Blood, Arm, Right [[id number]]  (Normal) Collected: 07/12/24 1706    Lab Status: Preliminary result Specimen: Blood from Arm, Right Updated: 07/14/24 1731     Blood Culture No growth at 2 days    Blood Culture - Blood, Arm, Left [[id number]]  (Normal) Collected: 07/12/24 1652    Lab Status: Preliminary result Specimen: Blood from Arm, Left Updated: 07/14/24 1831     Blood Culture No growth at 2 days    Urine Culture - Urine, Urine, Clean Catch [[id number]] Collected: 07/12/24 1640    Lab Status: Final result Specimen: Urine, Clean Catch Updated: 07/14/24 1155     Urine Culture <25,000 CFU/mL Mixed Taylor Isolated    Narrative:      Specimen contains mixed organisms of questionable pathogenicity suggestive of contamination. If symptoms persist, suggest recollection.  Colonization of the urinary tract without infection is common. Treatment is discouraged unless the patient is symptomatic, pregnant, or undergoing an invasive urologic procedure.            NM Lung Scan Perfusion Particulate    Result Date: 7/15/2024  DATE OF EXAM: 7/15/2024 11:08 AM EDT PROCEDURE: NM LUNG SCAN PERFUSION PARTICULATE INDICATIONS: + D.dimer, recent operation, cough, fatigue; r/o PE COMPARISON: Chest x-ray from 7/12/2024 TECHNIQUE: 5.3 mCi of technetium 99m labeled MAA was administered intravenously for the perfusion portion of the pulmonary exam. Scintigraphic images of the lungs were obtained in multiple projections to assess perfusion. FINDINGS: There is very heterogeneous distribution of radiotracer. Additionally,, there our several wedge-shaped perfusion defects identified bilaterally.     1. Very heterogeneous uptake of radiotracer which may be secondary to underlying lung disease. 2. There are several peripheral wedge-shaped perfusion defects. I am concern for underlying pulmonary embolism, however given the overall heterogeneity of uptake, it does  decrease accuracy. Electronically Signed: Jean Paul Block MD  7/15/2024 11:22 AM EDT  Workstation ID: [id number]    Duplex Venous Lower Extremity - Bilateral CAR    Result Date: 7/14/2024    Normal bilateral lower extremity venous duplex scan.     CT Abdomen Pelvis With & Without Contrast    Result Date: 7/12/2024  CT ABDOMEN PELVIS W WO CONTRAST Date of Exam: 7/12/2024 6:03 PM EDT Indication: GIB protocol. Comparison: None available. Technique: Axial CT images were obtained of the abdomen and pelvis before and after the uneventful intravenous administration of 75 cc Isovue-370 IV contrast . Sagittal and coronal reconstructions were performed.  Automated exposure control and iterative  reconstruction methods were used. FINDINGS: Lung bases: Mucoid impaction within both lower lobe bronchi. 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 3.1 cm fusiform aneurysm of the infrarenal abdominal aorta.. Liver:No masses. No intrahepatic biliary ductal dilatation. Spleen: Calcified granulomata Pancreas:No pancreatic masses. No evidence of pancreatitis. Gallbladder and common bile duct:No evidence of cholelithiasis. No evidence of cholecystitis. Adrenal glands:No adrenal masses Kidneys and ureters: Significantly diminished enhancement of the right kidney with severe atheromatous disease at the origin of the right kidney. The right kidney appears atrophic. Nonobstructing right renal calculus. No calculi present within the ureters. Normal caliber ureters. Urinary bladder:No urinary bladder wall thickening. No bladder masses. Small bowel: Small focus of enhancement involving the anterior gastric wall may represent the source of the GI bleed. Large bowel: Extensive colonic diverticulosis without evidence of diverticulitis. Appendix: Normal GENITOURINARY: Normal appearance of the uterus Ascites or pneumoperitoneum:None. Adenopathy:None present Osseous  structures: Left hip replacement. Prior gamma nailing of a right hip fracture. Degenerative changes of the hips. Degenerative changes of the lumbar spine. Prior vertebroplasty of T11. No lytic or blastic disease. Other findings: None     1. Small focus of enhancement involving the anterior gastric wall may represent the origin of the patient's GI bleed. 2. There is a 6.2 cm fusiform aneurysm of the descending thoracic aorta with continuation of the aneurysm into the upper abdomen. There is a 3.1 cm aneurysm of the infrarenal abdominal aorta. 3. Significantly diminished enhancement of the right kidney with severe atheromatous disease at the origin of the right renal artery. Right renal atrophy. 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 Electronically Signed: Roly Garcia MD  7/12/2024 6:46 PM EDT  Workstation ID: [id number]    XR Chest 1 View    Result Date: 7/12/2024  XR CHEST 1 VW Date of Exam: 7/12/2024 5:55 PM EDT Indication: cough Comparison: 7/3/2024 at 0910 hours FINDINGS: No new consolidations or pleural effusions are observed. The cardiac silhouette and mediastinum are stable. No acute osseous abnormalities are identified. Kyphoplasty/vertebroplasty changes are noted.     There is no significant change when compared to the prior study. There is no evidence for acute cardiopulmonary process. Electronically Signed: Murphy Hooker MD  7/12/2024 6:21 PM EDT  Workstation ID: [id number]     Results for orders placed during the hospital encounter of 07/12/24    Duplex Venous Lower Extremity - Bilateral CAR    Interpretation Summary    Normal bilateral lower extremity venous duplex scan.      Results for orders placed during the hospital encounter of 07/12/24    Duplex Venous Lower Extremity - Bilateral CAR    Interpretation Summary    Normal bilateral lower extremity venous duplex scan.          Plan for Follow-up of Pending Labs/Results:   Pending Labs        Order Current Status    Blood Culture - Blood, Arm, Left Preliminary result    Blood Culture - Blood, Arm, Right Preliminary result          Discharge Details        Discharge Medications        ASK your doctor about these medications        Instructions Start Date   acetaminophen 325 MG tablet  Commonly known as: Tylenol   650 mg, Oral, Every 6 Hours PRN      atorvastatin 10 MG tablet  Commonly known as: LIPITOR   10 mg, Oral, Nightly      brimonidine 0.2 % ophthalmic solution  Commonly known as: ALPHAGAN   1 drop, Right Eye, 2 Times Daily      busPIRone 5 MG tablet  Commonly known as: BUSPAR   5 mg, Oral, 2 Times Daily      dorzolamide-timolol 2-0.5 % ophthalmic solution  Commonly known as: COSOPT   1 drop, Right Eye, 2 Times Daily      doxycycline 100 MG capsule  Commonly known as: MONODOX   100 mg, Oral, 2 Times Daily, For 7 days, started on 07-09-24      ferrous sulfate 325 (65 FE) MG tablet   325 mg, Oral, Daily With Breakfast, OTC      furosemide 20 MG tablet  Commonly known as: LASIX   20 mg, Oral, Daily      latanoprost 0.005 % ophthalmic solution  Commonly known as: XALATAN   1 drop, Both Eyes, Nightly      Lidocaine Pain Relief 4 %  Generic drug: Lidocaine   1 patch, Transdermal, Every 24 Hours Scheduled, Remove & Discard patch within 12 hours or as directed by MD      losartan 25 MG tablet  Commonly known as: COZAAR   25 mg, Oral, Every 24 Hours Scheduled      oxyCODONE 5 MG immediate release tablet  Commonly known as: ROXICODONE   5 mg, Oral, Every 8 Hours PRN      pantoprazole 40 MG EC tablet  Commonly known as: PROTONIX   40 mg, Oral, Daily      potassium chloride 10 MEQ CR capsule  Commonly known as: MICRO-K   10 mEq, Oral, Daily, Takes with lasix      traZODone 50 MG tablet  Commonly known as: DESYREL   50 mg, Oral, Nightly               No Known Allergies      Discharge Disposition:      Diet:  Hospital:  Diet Order   Procedures    Diet: Gastrointestinal; Fiber-Restricted; Texture: Soft to Chew  (NDD 3); Soft to Chew: Whole Meat; Fluid Consistency: Thin (IDDSI 0)            Activity:      Restrictions or Other Recommendations:         CODE STATUS:    Code Status and Medical Interventions:   Ordered at: 07/12/24 2037     Medical Intervention Limits:    No intubation (DNI)     Level Of Support Discussed With:    Patient     Code Status (Patient has no pulse and is not breathing):    No CPR (Do Not Attempt to Resuscitate)     Medical Interventions (Patient has pulse or is breathing):    Limited Support     Comments:    DNR/DNI       Future Appointments   Date Time Provider Department Center   7/24/2024 12:00 PM Michelle Dowell PA-C [facility]       Additional Instructions for the Follow-ups that You Need to Schedule       Ambulatory Referral to Home Health   As directed      Face to Face Visit Date: 7/15/2024   Follow-up provider for Plan of Care?: I treated the patient in an acute care facility and will not continue treatment after discharge.   Follow-up provider: ZI CRAWFORD [[id number]]   Reason/Clinical Findings: Impaired functional mobility, endurance, balance, and gait.   Describe mobility limitations that make leaving home difficult: Impaired functional mobility, endurance, balance, and gait.   Nursing/Therapeutic Services Requested: Skilled Nursing Physical Therapy Occupational Therapy   Skilled nursing orders: Medication education Cardiopulmonary assessments   PT orders: Therapeutic exercise Transfer training Strengthening   Occupational orders: Activities of daily living Strengthening   Frequency: 1 Week 1                      Kateryna Reaves MD  07/15/24      Time Spent on Discharge:  I spent  40  minutes on this discharge activity which included: face-to-face encounter with the patient, reviewing the data in the system, coordination of the care with the nursing staff as well as consultants, documentation, and entering orders.

## 2024-07-16 NOTE — OUTREACH NOTE
Prep Survey      Flowsheet Row Responses   [facility] facility patient discharged from? [place]   Is LACE score < 7 ? No   Eligibility Readm Mgmt   Discharge diagnosis Melena   Does the patient have one of the following disease processes/diagnoses(primary or secondary)? Other   Does the patient have Home health ordered? Yes   What is the Home health agency?  [facility]   Prep survey completed? Yes            Breann JOLLEY - Registered Nurse

## 2024-07-17 LAB
BACTERIA SPEC AEROBE CULT: NORMAL
BACTERIA SPEC AEROBE CULT: NORMAL

## 2024-07-24 ENCOUNTER — OFFICE VISIT (OUTPATIENT)
Dept: NEUROSURGERY | Facility: CLINIC | Age: 84
End: 2024-07-24
Payer: MEDICARE

## 2024-07-24 ENCOUNTER — OFFICE VISIT (OUTPATIENT)
Dept: PAIN MEDICINE | Facility: CLINIC | Age: 84
End: 2024-07-24
Payer: MEDICARE

## 2024-07-24 VITALS — HEIGHT: 61 IN | BODY MASS INDEX: 15.48 KG/M2 | TEMPERATURE: 98.2 F | WEIGHT: 82 LBS

## 2024-07-24 VITALS — BODY MASS INDEX: 15.48 KG/M2 | HEIGHT: 61 IN | WEIGHT: 82 LBS

## 2024-07-24 DIAGNOSIS — S22.009S CLOSED FRACTURE OF MULTIPLE THORACIC VERTEBRAE, SEQUELA: Primary | ICD-10-CM

## 2024-07-24 DIAGNOSIS — S22.009A CLOSED FRACTURE OF MULTIPLE THORACIC VERTEBRAE, INITIAL ENCOUNTER: Primary | ICD-10-CM

## 2024-07-24 DIAGNOSIS — G89.4 CHRONIC PAIN SYNDROME: ICD-10-CM

## 2024-07-24 RX ORDER — CALCITONIN SALMON 200 [IU]/.09ML
1 SPRAY, METERED NASAL DAILY
Qty: 1 EACH | Refills: 0 | Status: SHIPPED | OUTPATIENT
Start: 2024-07-24

## 2024-07-24 RX ORDER — OXYCODONE HYDROCHLORIDE AND ACETAMINOPHEN 5; 325 MG/1; MG/1
1 TABLET ORAL DAILY PRN
Qty: 30 TABLET | Refills: 0 | Status: SHIPPED | OUTPATIENT
Start: 2024-07-24

## 2024-07-24 RX ORDER — BACLOFEN 10 MG/1
TABLET ORAL
Qty: 30 TABLET | Refills: 0 | Status: SHIPPED | OUTPATIENT
Start: 2024-07-24

## 2024-07-24 NOTE — H&P (VIEW-ONLY)
Name: Mariia Roberson    : 1940     MRN: [id number]     Primary Care Provider: Susan Waters APRN    Chief Complaint  Back Pain      History of Present Illness:  Mariia Rboerson is a 84 y.o. female who is 3 weeks s/p vertebroplasty.  Patient presented to [facility] ED with back pain after being picked up out of the vehicle.  Imaging demonstrated T9 and T11 compression fractures.  Patient subsequently underwent vertebroplasty with Dr. Perez and initially reported improvement in her pain.  Patient presents today with her daughter for routine follow-up.  Inpatient rehab was recommended at discharge, though patient and daughter refused, and patient returned home.  They report shortly after discharge, her pain returned, and reports the pain is now an 8/10 when she is sitting.  Pain improves with laying down and walking.  She has been taking oxycodone and reports that it does not help the pain.  She did receive a back brace, but it did not help with the pain and was more uncomfortable than helpful.    PMHX  Allergies:  No Known Allergies  Medications    Current Outpatient Medications:     acetaminophen (Tylenol) 325 MG tablet, Take 2 tablets by mouth Every 6 (Six) Hours As Needed for Mild Pain. (Patient taking differently: Take 2 tablets by mouth Every 6 (Six) Hours As Needed for Mild Pain, Headache or Fever. OTC), Disp: , Rfl:     atorvastatin (LIPITOR) 10 MG tablet, Take 1 tablet by mouth Every Night., Disp: , Rfl:     brimonidine (ALPHAGAN) 0.2 % ophthalmic solution, Administer 1 drop to the right eye 2 (Two) Times a Day., Disp: , Rfl:     busPIRone (BUSPAR) 5 MG tablet, Take 1 tablet by mouth 2 (Two) Times a Day., Disp: , Rfl:     dorzolamide-timolol (COSOPT) 2-0.5 % ophthalmic solution, Administer 1 drop to the right eye 2 (Two) Times a Day., Disp: , Rfl:     doxycycline (MONODOX) 100 MG capsule, Take 1 capsule by mouth 2 (Two) Times a Day. For 7 days, started on 24, Disp: , Rfl:     ferrous sulfate 325  (65 FE) MG tablet, Take 1 tablet by mouth Daily With Breakfast. OTC, Disp: , Rfl:     latanoprost (XALATAN) 0.005 % ophthalmic solution, Administer 1 drop to both eyes Every Night., Disp: , Rfl:     Lidocaine 4 %, Place 1 patch on the skin as directed by provider Daily. Remove & Discard patch within 12 hours or as directed by MD, Disp: 5 each, Rfl: 0    losartan (COZAAR) 25 MG tablet, Take 1 tablet by mouth Daily., Disp: 30 tablet, Rfl: 1    pantoprazole (PROTONIX) 40 MG EC tablet, Take 1 tablet by mouth Daily., Disp: , Rfl:     polyethylene glycol (MIRALAX) 17 GM/SCOOP powder, Dissolve 17 g (1 capful) of powder in 8 oz of water and take by mouth Daily., Disp: 510 g, Rfl: 0    traZODone (DESYREL) 50 MG tablet, Take 1 tablet by mouth Every Night., Disp: , Rfl:     oxyCODONE (ROXICODONE) 5 MG immediate release tablet, Take 1 tablet by mouth Every 8 (Eight) Hours As Needed for Moderate Pain. Indications: Moderate Pain (Patient taking differently: Take 1 tablet by mouth Every 8 (Eight) Hours As Needed for Moderate Pain.), Disp: 9 tablet, Rfl: 0  Past Medical History:  Past Medical History:   Diagnosis Date    AAA (abdominal aortic aneurysm)     Aneurysm 2023    Arthritis     CHF (congestive heart failure)     Claustrophobia     Clotting disorder 2024    Rectal    COPD (chronic obstructive pulmonary disease)     Glaucoma     Hypertension     Low back pain 2024    Stroke     Thoracic disc disorder 2024     Past Surgical History:  Past Surgical History:   Procedure Laterality Date    ENDOSCOPY N/A 07/13/2024    Procedure: ESOPHAGOGASTRODUODENOSCOPY;  Surgeon: Brunner, Mark I, MD;  Location:  [facility];  Service: Gastroenterology;  Laterality: N/A;    INTERVENTIONAL RADIOLOGY PROCEDURE N/A 07/01/2024    Procedure: IR vertebroplasty thoracic with fluoroscopy, T9 and T10;  Surgeon: Javier Perez MD;  Location:  [facility];  Service: Interventional Radiology;  Laterality: N/A;    JOINT REPLACEMENT       VERTEBROPLASTY  07/01/2024     Social Hx:  Social History     Tobacco Use    Smoking status: Former     Current packs/day: 0.00     Average packs/day: 1 pack/day for 65.3 years (67.5 ttl pk-yrs)     Types: Cigarettes     Start date: 1960     Quit date: 2021     Years since quitting: 3.5   Vaping Use    Vaping status: Never Used   Substance Use Topics    Alcohol use: Not Currently    Drug use: Never     Family Hx:  Family History   Problem Relation Age of Onset    Diabetes Mother     No Known Problems Father      Review of Systems:        Review of Systems   Constitutional:  Negative for activity change, appetite change, chills, diaphoresis, fatigue, fever and unexpected weight change.   HENT:  Negative for congestion, dental problem, drooling, ear discharge, ear pain, facial swelling, hearing loss, mouth sores, nosebleeds, postnasal drip, rhinorrhea, sinus pressure, sinus pain, sneezing, sore throat, tinnitus, trouble swallowing and voice change.    Eyes:  Negative for photophobia, pain, discharge, redness, itching and visual disturbance.   Respiratory:  Negative for apnea, cough, choking, chest tightness, shortness of breath, wheezing and stridor.    Cardiovascular:  Negative for chest pain, palpitations and leg swelling.   Gastrointestinal:  Negative for abdominal distention, abdominal pain, anal bleeding, blood in stool, constipation, diarrhea, nausea, rectal pain and vomiting.   Endocrine: Negative for cold intolerance, heat intolerance, polydipsia, polyphagia and polyuria.   Genitourinary:  Negative for decreased urine volume, difficulty urinating, dyspareunia, dysuria, enuresis, flank pain, frequency, genital sores, hematuria, menstrual problem, pelvic pain, urgency, vaginal bleeding, vaginal discharge and vaginal pain.   Musculoskeletal:  Positive for back pain and gait problem. Negative for arthralgias, joint swelling, myalgias, neck pain and neck stiffness.   Skin:  Negative for color change, pallor,  rash and wound.   Allergic/Immunologic: Negative for environmental allergies, food allergies and immunocompromised state.   Neurological:  Positive for weakness. Negative for dizziness, tremors, seizures, syncope, facial asymmetry, speech difficulty, light-headedness, numbness and headaches.   Hematological:  Negative for adenopathy. Does not bruise/bleed easily.   Psychiatric/Behavioral:  Negative for agitation, behavioral problems, confusion, decreased concentration, dysphoric mood, hallucinations, self-injury, sleep disturbance and suicidal ideas. The patient is not nervous/anxious and is not hyperactive.         Review of  Imaging: No new imaging    Vital Signs:   Vitals:    07/24/24 1127   Temp: 98.2 °F (36.8 °C)        Physical Exam  General: Frail-appearing, visibly in pain.    Neuro: Alert and oriented x 3.  Nonfocal neurologic exam.  Speech is clear.  No facial droop.  I do not appreciate any gross visual field deficits.  EOMs intact bilaterally.  Strength is symmetric in upper and lower extremities.  Uses a wheelchair for long distances due to back pain.       Social History    Tobacco Use      Smoking status: Former        Packs/day: 0.00        Years: 1 pack/day for 65.3 years (67.5 ttl pk-yrs)        Types: Cigarettes        Start date: 1960        Quit date: 2021        Years since quitting: 3.5      Smokeless tobacco: Not on file       Tobacco Use: Medium Risk (7/24/2024)    Patient History     Smoking Tobacco Use: Former     Smokeless Tobacco Use: Unknown     Passive Exposure: Not on file         STEADI Fall Risk Assessment was completed, and patient is at MODERATE risk for falls. Assessment completed on:7/24/2024        Assessment/Plan    Diagnoses and all orders for this visit:    1. Closed fracture of multiple thoracic vertebrae, sequela (Primary)  -     Ambulatory Referral to Pain Management         Mariia Roberson is a 84 y.o. female who is 3 weeks s/p vertebroplasty due to T9 and T11  compression fractures.  Her pain initially improved while in the hospital, but returned a few days following discharge.  She has been taking oxycodone without relief.  I am putting in a referral to pain management with hopes they can improve her pain.  We will be happy to see Ms. Roberson back in the neuroneurointervention clinic on an as-needed basis.    Patient encouraged to contact us if she has any changes in her condition or any concerns.    Any copied data from previous notes included in the (1) HPI, (2) PE, (3) MDM and/or Assessment and Plan has been reviewed and accurate as of 07/24/24.    Michelle Dowell PA-C  07/24/24

## 2024-07-24 NOTE — PROGRESS NOTES
Referring Physician: Michelle Dowell PA-C  [address]  [place] [address]    Primary Physician: Susan Waters APRN    CHIEF COMPLAINT or REASON FOR VISIT: Back Pain (New patient)      Initial history of present illness on 07/24/2024:  Ms. Mariia Roberson is 84 y.o. female who presents as a new patient referral for ration treatment of midline thoracic back pain after compression fracture.  She is accompanied by her daughter today.  Apparently she has a longstanding history of mild back pain however developed acute midline thoracic back pain after being picked up by her son.  She was found to have T9 and T10 compression fractures for which she underwent vertebroplasty with Dr. Perez on 7/1/2024.  According to her daughter she did have a few days of modestly improved symptoms however pain quickly returned.  She continues complain of severe midline thoracic back pain.  Patient denies any bowel or bladder dysfunction, lower extremity weakness, new onset saddle anesthesia or unexplained weight loss.   She was recently evaluated again by our neurointervention team, Michelle Dowell PA-C, who referred for management of persistent back pain.  Patient had been given a TLSO but refuses to wear it.    Interval history:    Interventions:      Objective Pain Scoring:   BRIEF PAIN INVENTORY:  Total score:   Pain Score    07/24/24 1211   PainSc:   8   PainLoc: Back      PHQ-2: PHQ-2 Total Score: 3  PHQ-9: PHQ-9: Brief Depression Severity Measure Score: 5  Opioid Risk Tool:         Review of Systems:   ROS negative except as otherwise noted     Past Medical History:   Past Medical History:   Diagnosis Date    AAA (abdominal aortic aneurysm)     Aneurysm 2023    Arthritis     CHF (congestive heart failure)     Claustrophobia     Clotting disorder 2024    Rectal    COPD (chronic obstructive pulmonary disease)     Glaucoma     Hypertension     Low back pain 2024    Stroke     Thoracic disc disorder 2024          Past Surgical History:   Past Surgical History:   Procedure Laterality Date    ENDOSCOPY N/A 07/13/2024    Procedure: ESOPHAGOGASTRODUODENOSCOPY;  Surgeon: Brunner, Mark I, MD;  Location:  [facility];  Service: Gastroenterology;  Laterality: N/A;    INTERVENTIONAL RADIOLOGY PROCEDURE N/A 07/01/2024    Procedure: IR vertebroplasty thoracic with fluoroscopy, T9 and T10;  Surgeon: Javier Perez MD;  Location:  [facility];  Service: Interventional Radiology;  Laterality: N/A;    JOINT REPLACEMENT      VERTEBROPLASTY  07/01/2024         Family History   Family History   Problem Relation Age of Onset    Diabetes Mother     No Known Problems Father          Social History   Social History     Socioeconomic History    Marital status:    Tobacco Use    Smoking status: Former     Current packs/day: 0.00     Average packs/day: 1 pack/day for 65.3 years (67.5 ttl pk-yrs)     Types: Cigarettes     Start date: 1960     Quit date: 2021     Years since quitting: 3.5   Vaping Use    Vaping status: Never Used   Substance and Sexual Activity    Alcohol use: Not Currently    Drug use: Never    Sexual activity: Not Currently        Medications:     Current Outpatient Medications:     acetaminophen (Tylenol) 325 MG tablet, Take 2 tablets by mouth Every 6 (Six) Hours As Needed for Mild Pain. (Patient taking differently: Take 2 tablets by mouth Every 6 (Six) Hours As Needed for Mild Pain, Headache or Fever. OTC), Disp: , Rfl:     atorvastatin (LIPITOR) 10 MG tablet, Take 1 tablet by mouth Every Night., Disp: , Rfl:     brimonidine (ALPHAGAN) 0.2 % ophthalmic solution, Administer 1 drop to the right eye 2 (Two) Times a Day., Disp: , Rfl:     busPIRone (BUSPAR) 5 MG tablet, Take 1 tablet by mouth 2 (Two) Times a Day., Disp: , Rfl:     dorzolamide-timolol (COSOPT) 2-0.5 % ophthalmic solution, Administer 1 drop to the right eye 2 (Two) Times a Day., Disp: , Rfl:     doxycycline (MONODOX) 100 MG capsule, Take  "1 capsule by mouth 2 (Two) Times a Day. For 7 days, started on 07-09-24, Disp: , Rfl:     ferrous sulfate 325 (65 FE) MG tablet, Take 1 tablet by mouth Daily With Breakfast. OTC, Disp: , Rfl:     latanoprost (XALATAN) 0.005 % ophthalmic solution, Administer 1 drop to both eyes Every Night., Disp: , Rfl:     Lidocaine 4 %, Place 1 patch on the skin as directed by provider Daily. Remove & Discard patch within 12 hours or as directed by MD, Disp: 5 each, Rfl: 0    losartan (COZAAR) 25 MG tablet, Take 1 tablet by mouth Daily., Disp: 30 tablet, Rfl: 1    oxyCODONE (ROXICODONE) 5 MG immediate release tablet, Take 1 tablet by mouth Every 8 (Eight) Hours As Needed for Moderate Pain. Indications: Moderate Pain (Patient taking differently: Take 1 tablet by mouth Every 8 (Eight) Hours As Needed for Moderate Pain.), Disp: 9 tablet, Rfl: 0    pantoprazole (PROTONIX) 40 MG EC tablet, Take 1 tablet by mouth Daily., Disp: , Rfl:     polyethylene glycol (MIRALAX) 17 GM/SCOOP powder, Dissolve 17 g (1 capful) of powder in 8 oz of water and take by mouth Daily., Disp: 510 g, Rfl: 0    traZODone (DESYREL) 50 MG tablet, Take 1 tablet by mouth Every Night., Disp: , Rfl:         Physical Exam:     Vitals:    07/24/24 1211   Weight: 37.2 kg (82 lb)   Height: 154.4 cm (60.79\")   PainSc:   8   PainLoc: Back        General: Alert and oriented, No acute distress.   HEENT: Normocephalic, atraumatic.   Cardiovascular: No gross edema  Respiratory: Respirations are non-labored    Thoracic Spine:   Inspection: no gross abnormality:.  Kyphosis  Paraspinal muscle palpation: nontender  Spinous process palpation: Tender with palpation approximately T9      Motor Exam:    Strength: Rate on 1-5 scale Right Left    C5-Elbow flexion, Deltoid 5/5  5/5    C6-Wrist extension 5/5  5/5    C7- Elbow / finger extension 5/5  5/5    C8- Finger flexion 5/5  5/5    T1- Intrinsics hand 5/5  5/5    Strength: Rate on 1-5 scale Right Left    L1/2- hip flexion 5/5  " 5/5    L3- knee extension 5/5  5/5    L4- ankle dorsiflexion 5/5  5/5    L5- great toe extension 5/5  5/5    S1- ankle plantarflexion 5/5  5/5    Sensory Exam: Full and equal sensation to light touch throughout.    Neurologic: Cranial Nerves II-XII are grossly intact.     Psychiatric: Cooperative.   Gait: Wheelchair  Assistive Devices: None    Imaging Studies:   No results found for this or any previous visit.        Independent review of radiographic imaging:     Impression & Plan:       07/24/2024: Mariia Roberson is a 84 y.o. female with past medical history significant for HTN, malnutrition, melena, AAA, CHF, COPD, CVA who presents to the pain clinic for evaluation and treatment of subacute thoracic back pain secondary to T9 and T10 compression fractures now status post vertebroplasty with Dr. Perez.  Unfortunately she continues to have compression fracture pain.  Advised patient to use her brace.  Additionally we will trial baclofen, calcitonin, Percocet.  Expectation is that she will improve over the next 6 months; in the meantime we will treat conservatively.  Can consider Sprint PNS if pain persist beyond 6 months.    1. Closed fracture of multiple thoracic vertebrae, initial encounter    2. Chronic pain syndrome        PLAN:  1. Medication Recommendations: Recommend Voltaren topical, NSAIDs, Tylenol.  Can trial turmeric 500 mg twice daily if NSAID contraindicated.  -Start Percocet daily as needed 30 pills no refills  -Start intranasal calcitonin  -Start baclofen  -As part of this patient's treatment plan, patient will be prescribed controlled substances. The patient has been made aware of appropriate use of such medications, including potential risk of somnolence, limited ability to drive and /or work safely, and potential for dependence or overdose. It has also been made clear that these medications are for use by this patient only, without concomitant use of alcohol or other substances unless  prescribed.Controlled substance status of medication discussed with patient, discussed risks of medication including abuse potential and diversion potential and need to follow up for reevaluation appointment in order to receive further refills.  Deshawn was reviewed and compliant.    2. Physical Therapy: Patient advised to minimize heavy physical activity while this heals    3. Psychological: defer    4. Complementary and alternative (CAM) Therapies:     5. Labs/Diagnostic studies: None indicated     6. Imaging: MRI independently interpreted and reviewed with patient    7. Interventions: None indicated at this time    8. Referrals: None indicated     9. Records: Neurosurgery notes reviewed    10. Lifestyle goals:    Follow-up 1 month      [facility]  Wayne Castaneda MD          Quality Metrics:

## 2024-07-24 NOTE — PROGRESS NOTES
Name: Mariia Roberson    : 1940     MRN: [id number]     Primary Care Provider: Susan Waters APRN    Chief Complaint  Back Pain      History of Present Illness:  Mariia Roberson is a 84 y.o. female who is 3 weeks s/p vertebroplasty.  Patient presented to [facility] ED with back pain after being picked up out of the vehicle.  Imaging demonstrated T9 and T11 compression fractures.  Patient subsequently underwent vertebroplasty with Dr. Perez and initially reported improvement in her pain.  Patient presents today with her daughter for routine follow-up.  Inpatient rehab was recommended at discharge, though patient and daughter refused, and patient returned home.  They report shortly after discharge, her pain returned, and reports the pain is now an 8/10 when she is sitting.  Pain improves with laying down and walking.  She has been taking oxycodone and reports that it does not help the pain.  She did receive a back brace, but it did not help with the pain and was more uncomfortable than helpful.    PMHX  Allergies:  No Known Allergies  Medications    Current Outpatient Medications:     acetaminophen (Tylenol) 325 MG tablet, Take 2 tablets by mouth Every 6 (Six) Hours As Needed for Mild Pain. (Patient taking differently: Take 2 tablets by mouth Every 6 (Six) Hours As Needed for Mild Pain, Headache or Fever. OTC), Disp: , Rfl:     atorvastatin (LIPITOR) 10 MG tablet, Take 1 tablet by mouth Every Night., Disp: , Rfl:     brimonidine (ALPHAGAN) 0.2 % ophthalmic solution, Administer 1 drop to the right eye 2 (Two) Times a Day., Disp: , Rfl:     busPIRone (BUSPAR) 5 MG tablet, Take 1 tablet by mouth 2 (Two) Times a Day., Disp: , Rfl:     dorzolamide-timolol (COSOPT) 2-0.5 % ophthalmic solution, Administer 1 drop to the right eye 2 (Two) Times a Day., Disp: , Rfl:     doxycycline (MONODOX) 100 MG capsule, Take 1 capsule by mouth 2 (Two) Times a Day. For 7 days, started on 24, Disp: , Rfl:     ferrous sulfate 325  (65 FE) MG tablet, Take 1 tablet by mouth Daily With Breakfast. OTC, Disp: , Rfl:     latanoprost (XALATAN) 0.005 % ophthalmic solution, Administer 1 drop to both eyes Every Night., Disp: , Rfl:     Lidocaine 4 %, Place 1 patch on the skin as directed by provider Daily. Remove & Discard patch within 12 hours or as directed by MD, Disp: 5 each, Rfl: 0    losartan (COZAAR) 25 MG tablet, Take 1 tablet by mouth Daily., Disp: 30 tablet, Rfl: 1    pantoprazole (PROTONIX) 40 MG EC tablet, Take 1 tablet by mouth Daily., Disp: , Rfl:     polyethylene glycol (MIRALAX) 17 GM/SCOOP powder, Dissolve 17 g (1 capful) of powder in 8 oz of water and take by mouth Daily., Disp: 510 g, Rfl: 0    traZODone (DESYREL) 50 MG tablet, Take 1 tablet by mouth Every Night., Disp: , Rfl:     oxyCODONE (ROXICODONE) 5 MG immediate release tablet, Take 1 tablet by mouth Every 8 (Eight) Hours As Needed for Moderate Pain. Indications: Moderate Pain (Patient taking differently: Take 1 tablet by mouth Every 8 (Eight) Hours As Needed for Moderate Pain.), Disp: 9 tablet, Rfl: 0  Past Medical History:  Past Medical History:   Diagnosis Date    AAA (abdominal aortic aneurysm)     Aneurysm 2023    Arthritis     CHF (congestive heart failure)     Claustrophobia     Clotting disorder 2024    Rectal    COPD (chronic obstructive pulmonary disease)     Glaucoma     Hypertension     Low back pain 2024    Stroke     Thoracic disc disorder 2024     Past Surgical History:  Past Surgical History:   Procedure Laterality Date    ENDOSCOPY N/A 07/13/2024    Procedure: ESOPHAGOGASTRODUODENOSCOPY;  Surgeon: Brunner, Mark I, MD;  Location:  [facility];  Service: Gastroenterology;  Laterality: N/A;    INTERVENTIONAL RADIOLOGY PROCEDURE N/A 07/01/2024    Procedure: IR vertebroplasty thoracic with fluoroscopy, T9 and T10;  Surgeon: Javier Perez MD;  Location:  [facility];  Service: Interventional Radiology;  Laterality: N/A;    JOINT REPLACEMENT       VERTEBROPLASTY  07/01/2024     Social Hx:  Social History     Tobacco Use    Smoking status: Former     Current packs/day: 0.00     Average packs/day: 1 pack/day for 65.3 years (67.5 ttl pk-yrs)     Types: Cigarettes     Start date: 1960     Quit date: 2021     Years since quitting: 3.5   Vaping Use    Vaping status: Never Used   Substance Use Topics    Alcohol use: Not Currently    Drug use: Never     Family Hx:  Family History   Problem Relation Age of Onset    Diabetes Mother     No Known Problems Father      Review of Systems:        Review of Systems   Constitutional:  Negative for activity change, appetite change, chills, diaphoresis, fatigue, fever and unexpected weight change.   HENT:  Negative for congestion, dental problem, drooling, ear discharge, ear pain, facial swelling, hearing loss, mouth sores, nosebleeds, postnasal drip, rhinorrhea, sinus pressure, sinus pain, sneezing, sore throat, tinnitus, trouble swallowing and voice change.    Eyes:  Negative for photophobia, pain, discharge, redness, itching and visual disturbance.   Respiratory:  Negative for apnea, cough, choking, chest tightness, shortness of breath, wheezing and stridor.    Cardiovascular:  Negative for chest pain, palpitations and leg swelling.   Gastrointestinal:  Negative for abdominal distention, abdominal pain, anal bleeding, blood in stool, constipation, diarrhea, nausea, rectal pain and vomiting.   Endocrine: Negative for cold intolerance, heat intolerance, polydipsia, polyphagia and polyuria.   Genitourinary:  Negative for decreased urine volume, difficulty urinating, dyspareunia, dysuria, enuresis, flank pain, frequency, genital sores, hematuria, menstrual problem, pelvic pain, urgency, vaginal bleeding, vaginal discharge and vaginal pain.   Musculoskeletal:  Positive for back pain and gait problem. Negative for arthralgias, joint swelling, myalgias, neck pain and neck stiffness.   Skin:  Negative for color change, pallor,  rash and wound.   Allergic/Immunologic: Negative for environmental allergies, food allergies and immunocompromised state.   Neurological:  Positive for weakness. Negative for dizziness, tremors, seizures, syncope, facial asymmetry, speech difficulty, light-headedness, numbness and headaches.   Hematological:  Negative for adenopathy. Does not bruise/bleed easily.   Psychiatric/Behavioral:  Negative for agitation, behavioral problems, confusion, decreased concentration, dysphoric mood, hallucinations, self-injury, sleep disturbance and suicidal ideas. The patient is not nervous/anxious and is not hyperactive.         Review of  Imaging: No new imaging    Vital Signs:   Vitals:    07/24/24 1127   Temp: 98.2 °F (36.8 °C)        Physical Exam  General: Frail-appearing, visibly in pain.    Neuro: Alert and oriented x 3.  Nonfocal neurologic exam.  Speech is clear.  No facial droop.  I do not appreciate any gross visual field deficits.  EOMs intact bilaterally.  Strength is symmetric in upper and lower extremities.  Uses a wheelchair for long distances due to back pain.       Social History    Tobacco Use      Smoking status: Former        Packs/day: 0.00        Years: 1 pack/day for 65.3 years (67.5 ttl pk-yrs)        Types: Cigarettes        Start date: 1960        Quit date: 2021        Years since quitting: 3.5      Smokeless tobacco: Not on file       Tobacco Use: Medium Risk (7/24/2024)    Patient History     Smoking Tobacco Use: Former     Smokeless Tobacco Use: Unknown     Passive Exposure: Not on file         STEADI Fall Risk Assessment was completed, and patient is at MODERATE risk for falls. Assessment completed on:7/24/2024        Assessment/Plan    Diagnoses and all orders for this visit:    1. Closed fracture of multiple thoracic vertebrae, sequela (Primary)  -     Ambulatory Referral to Pain Management         Mariia Roberson is a 84 y.o. female who is 3 weeks s/p vertebroplasty due to T9 and T11  compression fractures.  Her pain initially improved while in the hospital, but returned a few days following discharge.  She has been taking oxycodone without relief.  I am putting in a referral to pain management with hopes they can improve her pain.  We will be happy to see Ms. Roberson back in the neuroneurointervention clinic on an as-needed basis.    Patient encouraged to contact us if she has any changes in her condition or any concerns.    Any copied data from previous notes included in the (1) HPI, (2) PE, (3) MDM and/or Assessment and Plan has been reviewed and accurate as of 07/24/24.    Michelle Dowell PA-C  07/24/24

## 2024-07-25 ENCOUNTER — READMISSION MANAGEMENT (OUTPATIENT)
Dept: CALL CENTER | Facility: HOSPITAL | Age: 84
End: 2024-07-25
Payer: MEDICARE

## 2024-07-25 NOTE — OUTREACH NOTE
Medical Week 2 Survey      Flowsheet Row Responses   [facility] patient discharged from? [facility]   Does the patient have one of the following disease processes/diagnoses(primary or secondary)? Other   Week 2 attempt successful? Yes   Call start time 1817   Discharge diagnosis Lucinda   Call end time 1831   Person spoke with today (if not patient) and relationship kasi Tolbert   Meds reviewed with patient/caregiver? Yes   Is the patient having any side effects they believe may be caused by any medication additions or changes? No   Does the patient have all medications ordered at discharge? Yes   Is the patient taking all medications as directed (includes completed medication regime)? Yes   Does the patient have a primary care provider?  Yes   Does the patient have an appointment with their PCP within 7 days of discharge? Yes   Has the patient kept scheduled appointments due by today? Yes   What is the Home health agency?  [facility]   Has home health visited the patient within 72 hours of discharge? Yes   Psychosocial issues? No   What is the patient's perception of their health status since discharge? Improving   Is the patient/caregiver able to teach back signs and symptoms related to disease process for when to call PCP? Yes   Is the patient/caregiver able to teach back signs and symptoms related to disease process for when to call 911? Yes   Is the patient/caregiver able to teach back the hierarchy of who to call/visit for symptoms/problems? PCP, Specialist, Home health nurse, Urgent Care, ED, 911 Yes   If the patient is a current smoker, are they able to teach back resources for cessation? Not a smoker   Week 2 Call Completed? Yes   Is the patient interested in additional calls from an ambulatory ? No   Would this patient benefit from a Referral to Amb Social Work? No   Wrap up additional comments Dtr states pt is having a lot of back pain. Pt is taking percocet for pain  management. Dtr denies pt has any blood with BMs.   Call end time 1831            Alpa MCKEE - Registered Nurse

## 2024-08-02 ENCOUNTER — READMISSION MANAGEMENT (OUTPATIENT)
Dept: CALL CENTER | Facility: HOSPITAL | Age: 84
End: 2024-08-02
Payer: MEDICARE

## 2024-08-02 NOTE — OUTREACH NOTE
Medical Week 3 Survey      Flowsheet Row Responses   [facility] patient discharged from? [facility]   Does the patient have one of the following disease processes/diagnoses(primary or secondary)? Other   Week 3 attempt successful? Yes   Call start time 1722   Call end time 1724   Discharge diagnosis Lucinda   Person spoke with today (if not patient) and relationship kasi Tolbert   Meds reviewed with patient/caregiver? Yes   Is the patient having any side effects they believe may be caused by any medication additions or changes? No   Does the patient have all medications ordered at discharge? Yes   Is the patient taking all medications as directed (includes completed medication regime)? Yes   Does the patient have a primary care provider?  Yes   Does the patient have an appointment with their PCP within 7 days of discharge? Yes   Has the patient kept scheduled appointments due by today? Yes   What is the Home health agency?  [facility]   Has home health visited the patient within 72 hours of discharge? Yes   Psychosocial issues? No   What is the patient's perception of their health status since discharge? Improving   Is the patient/caregiver able to teach back signs and symptoms related to disease process for when to call PCP? Yes   Is the patient/caregiver able to teach back signs and symptoms related to disease process for when to call 911? Yes   Is the patient/caregiver able to teach back the hierarchy of who to call/visit for symptoms/problems? PCP, Specialist, Home health nurse, Urgent Care, ED, 911 Yes   If the patient is a current smoker, are they able to teach back resources for cessation? Not a smoker   Week 3 Call Completed? Yes   Graduated Yes   Is the patient interested in additional calls from an ambulatory ? No   Would this patient benefit from a Referral to Amb Social Work? No   Graduated/Revoked comments Dtr states pt is doing better, and denies any blood with BMs.  Pt had PCP fu appt   Wrap up additional comments Dtr states pt is doing better, and denies any blood with BMs. Pt had PCP fu appt   Call end time 1729            Alpa MCKEE - Registered Nurse

## 2024-08-09 ENCOUNTER — APPOINTMENT (OUTPATIENT)
Dept: CT IMAGING | Facility: HOSPITAL | Age: 84
End: 2024-08-09
Payer: MEDICARE

## 2024-08-09 ENCOUNTER — APPOINTMENT (OUTPATIENT)
Dept: GENERAL RADIOLOGY | Facility: HOSPITAL | Age: 84
End: 2024-08-09
Payer: MEDICARE

## 2024-08-09 ENCOUNTER — HOSPITAL ENCOUNTER (EMERGENCY)
Facility: HOSPITAL | Age: 84
Discharge: HOME | End: 2024-08-09
Attending: EMERGENCY MEDICINE
Payer: MEDICARE

## 2024-08-09 VITALS
WEIGHT: 88 LBS | DIASTOLIC BLOOD PRESSURE: 128 MMHG | BODY MASS INDEX: 17.28 KG/M2 | RESPIRATION RATE: 14 BRPM | HEIGHT: 60 IN | HEART RATE: 98 BPM | OXYGEN SATURATION: 98 % | TEMPERATURE: 98.6 F | SYSTOLIC BLOOD PRESSURE: 171 MMHG

## 2024-08-09 DIAGNOSIS — S22.080A T12 COMPRESSION FRACTURE, INITIAL ENCOUNTER: Primary | ICD-10-CM

## 2024-08-09 DIAGNOSIS — R10.9 ACUTE FLANK PAIN: ICD-10-CM

## 2024-08-09 DIAGNOSIS — N39.0 ACUTE URINARY TRACT INFECTION: ICD-10-CM

## 2024-08-09 LAB
ALBUMIN SERPL-MCNC: 3.7 G/DL (ref 3.5–5.2)
ALBUMIN/GLOB SERPL: 1.1 G/DL
ALP SERPL-CCNC: 141 U/L (ref 39–117)
ALT SERPL W P-5'-P-CCNC: 10 U/L (ref 1–33)
ANION GAP SERPL CALCULATED.3IONS-SCNC: 9 MMOL/L (ref 5–15)
AST SERPL-CCNC: 16 U/L (ref 1–32)
BACTERIA UR QL AUTO: ABNORMAL /HPF
BASOPHILS # BLD AUTO: 0.07 10*3/MM3 (ref 0–0.2)
BASOPHILS NFR BLD AUTO: 0.6 % (ref 0–1.5)
BILIRUB SERPL-MCNC: 0.3 MG/DL (ref 0–1.2)
BILIRUB UR QL STRIP: NEGATIVE
BUN SERPL-MCNC: 17 MG/DL (ref 8–23)
BUN/CREAT SERPL: 14.9 (ref 7–25)
CALCIUM SPEC-SCNC: 9 MG/DL (ref 8.6–10.5)
CHLORIDE SERPL-SCNC: 99 MMOL/L (ref 98–107)
CLARITY UR: CLEAR
CO2 SERPL-SCNC: 30 MMOL/L (ref 22–29)
COLOR UR: YELLOW
CREAT SERPL-MCNC: 1.14 MG/DL (ref 0.57–1)
DEPRECATED RDW RBC AUTO: 53.1 FL (ref 37–54)
EGFRCR SERPLBLD CKD-EPI 2021: 47 ML/MIN/1.73
EOSINOPHIL # BLD AUTO: 0.2 10*3/MM3 (ref 0–0.4)
EOSINOPHIL NFR BLD AUTO: 1.6 % (ref 0.3–6.2)
ERYTHROCYTE [DISTWIDTH] IN BLOOD BY AUTOMATED COUNT: 15.1 % (ref 12.3–15.4)
FLUAV RNA RESP QL NAA+PROBE: NOT DETECTED
FLUBV RNA RESP QL NAA+PROBE: NOT DETECTED
GLOBULIN UR ELPH-MCNC: 3.3 GM/DL
GLUCOSE SERPL-MCNC: 98 MG/DL (ref 65–99)
GLUCOSE UR STRIP-MCNC: NEGATIVE MG/DL
HCT VFR BLD AUTO: 37 % (ref 34–46.6)
HGB BLD-MCNC: 11.6 G/DL (ref 12–15.9)
HGB UR QL STRIP.AUTO: NEGATIVE
HYALINE CASTS UR QL AUTO: ABNORMAL /LPF
IMM GRANULOCYTES # BLD AUTO: 0.02 10*3/MM3 (ref 0–0.05)
IMM GRANULOCYTES NFR BLD AUTO: 0.2 % (ref 0–0.5)
KETONES UR QL STRIP: NEGATIVE
LEUKOCYTE ESTERASE UR QL STRIP.AUTO: ABNORMAL
LIPASE SERPL-CCNC: 21 U/L (ref 13–60)
LYMPHOCYTES # BLD AUTO: 2.4 10*3/MM3 (ref 0.7–3.1)
LYMPHOCYTES NFR BLD AUTO: 19.7 % (ref 19.6–45.3)
MCH RBC QN AUTO: 30.1 PG (ref 26.6–33)
MCHC RBC AUTO-ENTMCNC: 31.4 G/DL (ref 31.5–35.7)
MCV RBC AUTO: 95.9 FL (ref 79–97)
MONOCYTES # BLD AUTO: 0.84 10*3/MM3 (ref 0.1–0.9)
MONOCYTES NFR BLD AUTO: 6.9 % (ref 5–12)
NEUTROPHILS NFR BLD AUTO: 71 % (ref 42.7–76)
NEUTROPHILS NFR BLD AUTO: 8.66 10*3/MM3 (ref 1.7–7)
NITRITE UR QL STRIP: NEGATIVE
NRBC BLD AUTO-RTO: 0 /100 WBC (ref 0–0.2)
PH UR STRIP.AUTO: 7 [PH] (ref 5–8)
PLATELET # BLD AUTO: 328 10*3/MM3 (ref 140–450)
PMV BLD AUTO: 10.2 FL (ref 6–12)
POTASSIUM SERPL-SCNC: 3.1 MMOL/L (ref 3.5–5.2)
PROT SERPL-MCNC: 7 G/DL (ref 6–8.5)
PROT UR QL STRIP: ABNORMAL
RBC # BLD AUTO: 3.86 10*6/MM3 (ref 3.77–5.28)
RBC # UR STRIP: ABNORMAL /HPF
REF LAB TEST METHOD: ABNORMAL
SARS-COV-2 RNA RESP QL NAA+PROBE: NOT DETECTED
SODIUM SERPL-SCNC: 138 MMOL/L (ref 136–145)
SP GR UR STRIP: 1.01 (ref 1–1.03)
SQUAMOUS #/AREA URNS HPF: ABNORMAL /HPF
TROPONIN T SERPL HS-MCNC: 16 NG/L
UROBILINOGEN UR QL STRIP: ABNORMAL
WBC # UR STRIP: ABNORMAL /HPF
WBC NRBC COR # BLD AUTO: 12.19 10*3/MM3 (ref 3.4–10.8)

## 2024-08-09 PROCEDURE — 72128 CT CHEST SPINE W/O DYE: CPT

## 2024-08-09 PROCEDURE — 81001 URINALYSIS AUTO W/SCOPE: CPT | Performed by: EMERGENCY MEDICINE

## 2024-08-09 PROCEDURE — 74176 CT ABD & PELVIS W/O CONTRAST: CPT

## 2024-08-09 PROCEDURE — 36415 COLL VENOUS BLD VENIPUNCTURE: CPT

## 2024-08-09 PROCEDURE — 93005 ELECTROCARDIOGRAM TRACING: CPT | Performed by: EMERGENCY MEDICINE

## 2024-08-09 PROCEDURE — 85025 COMPLETE CBC W/AUTO DIFF WBC: CPT | Performed by: EMERGENCY MEDICINE

## 2024-08-09 PROCEDURE — 87636 SARSCOV2 & INF A&B AMP PRB: CPT | Performed by: EMERGENCY MEDICINE

## 2024-08-09 PROCEDURE — 80053 COMPREHEN METABOLIC PANEL: CPT | Performed by: EMERGENCY MEDICINE

## 2024-08-09 PROCEDURE — 99284 EMERGENCY DEPT VISIT MOD MDM: CPT

## 2024-08-09 PROCEDURE — 84484 ASSAY OF TROPONIN QUANT: CPT | Performed by: EMERGENCY MEDICINE

## 2024-08-09 PROCEDURE — 87086 URINE CULTURE/COLONY COUNT: CPT | Performed by: EMERGENCY MEDICINE

## 2024-08-09 PROCEDURE — 71045 X-RAY EXAM CHEST 1 VIEW: CPT

## 2024-08-09 PROCEDURE — 83690 ASSAY OF LIPASE: CPT | Performed by: EMERGENCY MEDICINE

## 2024-08-09 RX ORDER — CEFUROXIME AXETIL 250 MG/1
250 TABLET ORAL ONCE
Status: COMPLETED | OUTPATIENT
Start: 2024-08-09 | End: 2024-08-09

## 2024-08-09 RX ORDER — OXYCODONE AND ACETAMINOPHEN 10; 325 MG/1; MG/1
1 TABLET ORAL ONCE
Status: COMPLETED | OUTPATIENT
Start: 2024-08-09 | End: 2024-08-09

## 2024-08-09 RX ORDER — CEFUROXIME AXETIL 250 MG/1
250 TABLET ORAL 2 TIMES DAILY
Qty: 14 TABLET | Refills: 0 | Status: SHIPPED | OUTPATIENT
Start: 2024-08-09 | End: 2024-08-16

## 2024-08-09 RX ORDER — SODIUM CHLORIDE 0.9 % (FLUSH) 0.9 %
10 SYRINGE (ML) INJECTION AS NEEDED
Status: DISCONTINUED | OUTPATIENT
Start: 2024-08-09 | End: 2024-08-09

## 2024-08-09 RX ADMIN — OXYCODONE AND ACETAMINOPHEN 1 TABLET: 10; 325 TABLET ORAL at 16:19

## 2024-08-09 RX ADMIN — CEFUROXIME AXETIL 250 MG: 250 TABLET ORAL at 16:20

## 2024-08-09 NOTE — ED PROVIDER NOTES
Subjective   History of Present Illness  84-year-old female presents for evaluation of back pain, flank pain, and elevated blood pressure.  The patient has a history of chronic back pain.  However she has had increased severity of her back pain for the last week.  No definitive injury at that time.  The pain is present and the lower to mid thoracic spine also radiates into the left flank.  No radiation into the anterior abdomen.  The patient did take some oxycodone last night to help with the pain but did not have significant relief and was screaming out in pain at least a couple times throughout the night.  The patient was also noted to have elevated blood pressure and this prompted current presentation to the ER.  The patient has some nausea and associated headache as well.  No dysuria or other urinary symptoms.  No fever.  No recent trauma.  She denies any surgeries to the abdomen.  She denies nausea or vomiting.  No rashes identified over the area of pain.  No history of kidney stones.  The pain radiates from the right flank into the right abdomen.  The son helps provide history and states that the chronic back pain has been present for many years.  However the acute pain in the right flank with radiation into the right anterior abdomen has been present since 4:00 yesterday.      Review of Systems   Constitutional:  Positive for activity change and appetite change. Negative for chills, fatigue and fever.   HENT:  Negative for congestion, ear pain, postnasal drip, sinus pressure and sore throat.    Eyes:  Negative for pain, redness and visual disturbance.   Respiratory:  Negative for cough, chest tightness and shortness of breath.    Cardiovascular:  Negative for chest pain, palpitations and leg swelling.   Gastrointestinal:  Positive for abdominal pain. Negative for anal bleeding, blood in stool, diarrhea, nausea and vomiting.   Endocrine: Negative for polydipsia and polyuria.   Genitourinary:  Negative for  difficulty urinating, dysuria, frequency and urgency.   Musculoskeletal:  Positive for back pain. Negative for arthralgias and neck pain.   Skin:  Negative for pallor and rash.   Allergic/Immunologic: Negative for environmental allergies and immunocompromised state.   Neurological:  Negative for dizziness, weakness and headaches.   Hematological:  Negative for adenopathy.   Psychiatric/Behavioral:  Negative for confusion, self-injury and suicidal ideas. The patient is not nervous/anxious.    All other systems reviewed and are negative.      Past Medical History:   Diagnosis Date    AAA (abdominal aortic aneurysm)     Aneurysm 2023    Arthritis     CHF (congestive heart failure)     Claustrophobia     Clotting disorder 2024    Rectal    COPD (chronic obstructive pulmonary disease)     Glaucoma     Hypertension     Low back pain 2024    Stroke     Thoracic disc disorder 2024       No Known Allergies    Past Surgical History:   Procedure Laterality Date    ENDOSCOPY N/A 07/13/2024    Procedure: ESOPHAGOGASTRODUODENOSCOPY;  Surgeon: Brunner, Mark I, MD;  Location:  [facility];  Service: Gastroenterology;  Laterality: N/A;    INTERVENTIONAL RADIOLOGY PROCEDURE N/A 07/01/2024    Procedure: IR vertebroplasty thoracic with fluoroscopy, T9 and T10;  Surgeon: Javier Perez MD;  Location:  [facility]ATH INVASIVE LOCATION;  Service: Interventional Radiology;  Laterality: N/A;    JOINT REPLACEMENT      VERTEBROPLASTY  07/01/2024       Family History   Problem Relation Age of Onset    Diabetes Mother     No Known Problems Father        Social History     Socioeconomic History    Marital status:    Tobacco Use    Smoking status: Former     Current packs/day: 0.00     Average packs/day: 1 pack/day for 65.3 years (67.5 ttl pk-yrs)     Types: Cigarettes     Start date: 1960     Quit date: 2021     Years since quitting: 3.6   Vaping Use    Vaping status: Never Used   Substance and Sexual Activity    Alcohol use: Not  Currently    Drug use: Never    Sexual activity: Not Currently           Objective   Physical Exam  Vitals and nursing note reviewed.   Constitutional:       General: She is not in acute distress.     Appearance: Normal appearance. She is well-developed. She is not toxic-appearing or diaphoretic.   HENT:      Head: Normocephalic and atraumatic.      Right Ear: External ear normal.      Left Ear: External ear normal.      Nose: Nose normal.   Eyes:      General: Lids are normal.      Pupils: Pupils are equal, round, and reactive to light.   Neck:      Trachea: No tracheal deviation.   Cardiovascular:      Rate and Rhythm: Regular rhythm. Tachycardia present.      Pulses: No decreased pulses.      Heart sounds: Normal heart sounds. No murmur heard.     No friction rub. No gallop.   Pulmonary:      Effort: Pulmonary effort is normal. No respiratory distress.      Breath sounds: Normal breath sounds. No decreased breath sounds, wheezing, rhonchi or rales.   Abdominal:      General: Bowel sounds are normal.      Palpations: Abdomen is soft.      Tenderness: There is no abdominal tenderness in the right lower quadrant. There is right CVA tenderness. There is no guarding or rebound.   Musculoskeletal:         General: No deformity. Normal range of motion.      Cervical back: Normal range of motion and neck supple.   Lymphadenopathy:      Cervical: No cervical adenopathy.   Skin:     General: Skin is warm and dry.      Findings: No rash.   Neurological:      Mental Status: She is alert and oriented to person, place, and time.      Cranial Nerves: No cranial nerve deficit.      Sensory: No sensory deficit.   Psychiatric:         Speech: Speech normal.         Behavior: Behavior normal.         Thought Content: Thought content normal.         Judgment: Judgment normal.         Procedures           ED Course                                               Medical Decision Making  Differential diagnosis includes very tract  infection, pyelonephritis, musculoskeletal pain, kidney stone, bowel obstruction, of unspecified etiology.    COVID and flu test are negative.  Mild leukocytosis of 12,000.  Baseline kidney function, mild hypokalemia, otherwise no significant electrolyte abnormalities.  Baseline H&H.  Normal lipase.    Urine shows a small leuk esterase and 3+ bacteria with 11-20 white cells concerning for urinary tract infection.    Chest x-ray shows no acute disease.  CT scan of the abdomen pelvis shows interval superior endplate compression fracture of T12.  No acute intra-abdominal abnormalities.    The patient will be discharged with a course of antibiotics for treatment urinary tract infection.  The patient has already been prescribed Percocet for pain will be advised to take this as prescribed.  Advised to apply heat to the low back to help relax any spasm muscles.  Follow-up with neurosurgery for outpatient evaluation.    Problems Addressed:  Acute flank pain: complicated acute illness or injury with systemic symptoms  Acute urinary tract infection: complicated acute illness or injury with systemic symptoms  T12 compression fracture, initial encounter: complicated acute illness or injury with systemic symptoms that poses a threat to life or bodily functions    Amount and/or Complexity of Data Reviewed  External Data Reviewed: labs and radiology.  Labs: ordered. Decision-making details documented in ED Course.  Radiology: ordered and independent interpretation performed. Decision-making details documented in ED Course.  ECG/medicine tests: ordered and independent interpretation performed. Decision-making details documented in ED Course.    Risk  Prescription drug management.        Final diagnoses:   T12 compression fracture, initial encounter   Acute urinary tract infection   Acute flank pain       ED Disposition  ED Disposition       ED Disposition   Discharge    Condition   Stable    Comment   --               Geovany  Abdullahi Grider MD  [address]  [address]  [facility] [address]  [phone]    Schedule an appointment as soon as possible for a visit       Susan Waters, APRN  [address] [place] [address] [phone]    In 1 week           Medication List        New Prescriptions      cefuroxime 250 MG tablet  Commonly known as: CEFTIN  Take 1 tablet by mouth 2 (Two) Times a Day for 7 days.            Changed      acetaminophen 325 MG tablet  Commonly known as: Tylenol  Take 2 tablets by mouth Every 6 (Six) Hours As Needed for Mild Pain.  What changed:   reasons to take this  additional instructions               Where to Get Your Medications        These medications were sent to [facility] [address] [place] [address] - [address] [facility] - [phone]  - [phone] [address] [address]      Phone: [phone]   cefuroxime 250 MG tablet            Femi Langston MD  08/12/24 9254

## 2024-08-09 NOTE — Clinical Note
[facility] EMERGENCY DEPARTMENT  [address]  [facility] [address]  Phone: [phone]    Mariia Roberson was seen and treated in our emergency department on 8/9/2024.  She may return to work on 08/12/2024.         Thank you for choosing [facility].    Femi Langston MD

## 2024-08-09 NOTE — Clinical Note
[facility] EMERGENCY DEPARTMENT  [address]  [facility] [address]  Phone: [phone]    Mariia Roberson was seen and treated in our emergency department on 8/9/2024.  She may return to work on 08/12/2024.         Thank you for choosing [facility].    Femi Langston MD

## 2024-08-09 NOTE — DISCHARGE INSTRUCTIONS
Take antibiotics for treat with urinary tract infection.    Take already prescribed Percocet as needed for pain.    Apply heat to the low back and perform regular stretching.    Follow-up with neurosurgery for further outpatient evaluation of multiple compression fractures.

## 2024-08-10 LAB — BACTERIA SPEC AEROBE CULT: NORMAL

## 2024-08-11 LAB
QT INTERVAL: 374 MS
QTC INTERVAL: 462 MS

## 2024-08-12 ENCOUNTER — TELEPHONE (OUTPATIENT)
Dept: PAIN MEDICINE | Facility: CLINIC | Age: 84
End: 2024-08-12
Payer: MEDICARE

## 2024-08-12 NOTE — TELEPHONE ENCOUNTER
Caller: RENETTA RODRIGUES   Relationship to Patient: DAUGHTER CHLOE   Phone Number: [phone] (home)     Reason for Call: CALLING ABOUT A NEW PROBLEM SEEN IN ED ON 08/09/24 HAS A NEW SPINE FRACTURE ASKING TO COME IN ASAP

## 2024-08-14 ENCOUNTER — OFFICE VISIT (OUTPATIENT)
Dept: PAIN MEDICINE | Facility: CLINIC | Age: 84
End: 2024-08-14
Payer: MEDICARE

## 2024-08-14 VITALS — WEIGHT: 88 LBS | HEIGHT: 60 IN | BODY MASS INDEX: 17.28 KG/M2

## 2024-08-14 DIAGNOSIS — S22.009A CLOSED FRACTURE OF MULTIPLE THORACIC VERTEBRAE, INITIAL ENCOUNTER: ICD-10-CM

## 2024-08-14 DIAGNOSIS — S22.080A T12 COMPRESSION FRACTURE, INITIAL ENCOUNTER: Primary | ICD-10-CM

## 2024-08-14 DIAGNOSIS — S22.009D CLOSED FRACTURE OF MULTIPLE THORACIC VERTEBRAE WITH ROUTINE HEALING, SUBSEQUENT ENCOUNTER: Primary | ICD-10-CM

## 2024-08-14 DIAGNOSIS — S22.080A T12 COMPRESSION FRACTURE, INITIAL ENCOUNTER: ICD-10-CM

## 2024-08-14 RX ORDER — OXYCODONE HYDROCHLORIDE AND ACETAMINOPHEN 5; 325 MG/1; MG/1
1 TABLET ORAL DAILY PRN
Qty: 30 TABLET | Refills: 0 | Status: SHIPPED | OUTPATIENT
Start: 2024-08-14

## 2024-08-14 NOTE — TELEPHONE ENCOUNTER
Refill Percocet  Oxycodone-acetaminophen 5/325 mg once daily as needed, 30 pills, #0 refills  Last written on 7/24/2024  Deshawn reviewed and compliant  Appropriate follow-up visit scheduled  ORT performed in office.

## 2024-08-14 NOTE — PROGRESS NOTES
Referring Physician: No referring provider defined for this encounter.    Primary Physician: Susan Waters APRN    CHIEF COMPLAINT or REASON FOR VISIT: Follow-up (ED follow up, new fractures.) and T12 compression fracture, initial encounter      Initial history of present illness on 07/24/2024:  Ms. Mariia Roberson is 84 y.o. female who presents as a new patient referral for ration treatment of midline thoracic back pain after compression fracture.  She is accompanied by her daughter today.  Apparently she has a longstanding history of mild back pain however developed acute midline thoracic back pain after being picked up by her son.  She was found to have T9 and T10 compression fractures for which she underwent vertebroplasty with Dr. Perez on 7/1/2024.  According to her daughter she did have a few days of modestly improved symptoms however pain quickly returned.  She continues complain of severe midline thoracic back pain.  Patient denies any bowel or bladder dysfunction, lower extremity weakness, new onset saddle anesthesia or unexplained weight loss.   She was recently evaluated again by our neurointervention team, Michelle Dowell PA-C, who referred for management of persistent back pain.  Patient had been given a TLSO but refuses to wear it.    Interval history:  Patient returns to clinic today.  She was previously evaluated for us for continued back pain after T9 and T10 vertebroplasties.  Unfortunately, she has had some increased back pain which resulted in the emergency department visit.  She did undergo a CT scan which revealed a superior endplate compression fracture of T12 with 20-30% vertebral body height loss.  She continues to complain of chronic midline thoracic back pain.  She has been taking Percocet 5 mg every 12 hours with some relief.  Informed patient to only take this medication as needed and only once a day.  Her daughter reports little confusion regarding the prescription but expressed  "understanding.  Overall, she is tolerating this medication well.  We just discussed take the medication as prescribed.  She cannot recall a specific inciting injury or event that preceded this new compression fracture.  She is accompanied by her daughter today who reports that she did have a \"coughing fit\" prior to this fracture.  Additionally, she has been using the intranasal calcitonin with variable relief.  She is not being treated for potential osteoporosis.  Due to new compression fracture I do not believe Sprint PNS is a great option for the patient time.  We discussed multiple treatment options today.  Will refer patient back to Dr. Perez for consideration of vertebroplasty of the new T12 compression fracture.  Additionally, will refer to [place] [facility] for treatment of potential osteoporosis.  In the event that she has increased pain after kyphoplasty's we can always consider an intrathecal opioid trial      Interventions:      Objective Pain Scoring:   BRIEF PAIN INVENTORY:  Total score:   Pain Score    08/14/24 1059   PainSc:   6   PainLoc: Back      PHQ-2: PHQ-2 Total Score: 5  PHQ-9: PHQ-9: Brief Depression Severity Measure Score: 21  Opioid Risk Tool:         Review of Systems:   ROS negative except as otherwise noted     Past Medical History:   Past Medical History:   Diagnosis Date    AAA (abdominal aortic aneurysm)     Aneurysm 2023    Arthritis     CHF (congestive heart failure)     Claustrophobia     Clotting disorder 2024    Rectal    COPD (chronic obstructive pulmonary disease)     Fractures     Glaucoma     Hypertension     Low back pain 2024    Stroke     Thoracic disc disorder 2024         Past Surgical History:   Past Surgical History:   Procedure Laterality Date    ENDOSCOPY N/A 07/13/2024    Procedure: ESOPHAGOGASTRODUODENOSCOPY;  Surgeon: Brunner, Mark I, MD;  Location: [facility] ENDOSCOPY;  Service: Gastroenterology;  Laterality: N/A;    INTERVENTIONAL RADIOLOGY PROCEDURE N/A " 07/01/2024    Procedure: IR vertebroplasty thoracic with fluoroscopy, T9 and T10;  Surgeon: Javier Perez MD;  Location: [facility] INVASIVE LOCATION;  Service: Interventional Radiology;  Laterality: N/A;    JOINT REPLACEMENT      VERTEBROPLASTY  07/01/2024         Family History   Family History   Problem Relation Age of Onset    Diabetes Mother     No Known Problems Father          Social History   Social History     Socioeconomic History    Marital status:    Tobacco Use    Smoking status: Former     Current packs/day: 0.00     Average packs/day: 1.1 packs/day for 71.6 years (80.0 ttl pk-yrs)     Types: Cigarettes     Start date: 1/1/1960     Quit date: 2021     Years since quitting: 3.6   Vaping Use    Vaping status: Never Used   Substance and Sexual Activity    Alcohol use: Not Currently    Drug use: Never    Sexual activity: Not Currently        Medications:     Current Outpatient Medications:     acetaminophen (Tylenol) 325 MG tablet, Take 2 tablets by mouth Every 6 (Six) Hours As Needed for Mild Pain. (Patient taking differently: Take 2 tablets by mouth Every 6 (Six) Hours As Needed for Mild Pain, Headache or Fever. OTC), Disp: , Rfl:     atorvastatin (LIPITOR) 10 MG tablet, Take 1 tablet by mouth Every Night., Disp: , Rfl:     baclofen (LIORESAL) 10 MG tablet, Take 1-2 tabs per day as needed for muscle spasm., Disp: 30 tablet, Rfl: 0    brimonidine (ALPHAGAN) 0.2 % ophthalmic solution, Administer 1 drop to the right eye 2 (Two) Times a Day., Disp: , Rfl:     busPIRone (BUSPAR) 5 MG tablet, Take 1 tablet by mouth 2 (Two) Times a Day., Disp: , Rfl:     calcitonin, salmon, (Miacalcin) 200 UNIT/ACT nasal spray, 1 spray by Alternating Nares route Daily., Disp: 1 each, Rfl: 0    cefuroxime (CEFTIN) 250 MG tablet, Take 1 tablet by mouth 2 (Two) Times a Day for 7 days., Disp: 14 tablet, Rfl: 0    dorzolamide-timolol (COSOPT) 2-0.5 % ophthalmic solution, Administer 1 drop to the right eye 2 (Two) Times a  "Day., Disp: , Rfl:     doxycycline (MONODOX) 100 MG capsule, Take 1 capsule by mouth 2 (Two) Times a Day. For 7 days, started on 07-09-24, Disp: , Rfl:     ferrous sulfate 325 (65 FE) MG tablet, Take 1 tablet by mouth Daily With Breakfast. OTC, Disp: , Rfl:     latanoprost (XALATAN) 0.005 % ophthalmic solution, Administer 1 drop to both eyes Every Night., Disp: , Rfl:     Lidocaine 4 %, Place 1 patch on the skin as directed by provider Daily. Remove & Discard patch within 12 hours or as directed by MD, Disp: 5 each, Rfl: 0    losartan (COZAAR) 25 MG tablet, Take 1 tablet by mouth Daily. (Patient taking differently: Take 2 tablets by mouth Daily.), Disp: 30 tablet, Rfl: 1    oxyCODONE-acetaminophen (PERCOCET) 5-325 MG per tablet, Take 1 tablet by mouth Daily As Needed for Severe Pain., Disp: 30 tablet, Rfl: 0    pantoprazole (PROTONIX) 40 MG EC tablet, Take 1 tablet by mouth Daily., Disp: , Rfl:     polyethylene glycol (MIRALAX) 17 GM/SCOOP powder, Dissolve 17 g (1 capful) of powder in 8 oz of water and take by mouth Daily., Disp: 510 g, Rfl: 0    traZODone (DESYREL) 50 MG tablet, Take 1 tablet by mouth Every Night., Disp: , Rfl:         Physical Exam:     Vitals:    08/14/24 1059   Weight: 39.9 kg (88 lb)   Height: 152.4 cm (60\")   PainSc:   6   PainLoc: Back        General: Alert and oriented, No acute distress.   HEENT: Normocephalic, atraumatic.   Cardiovascular: No gross edema  Respiratory: Respirations are non-labored    Thoracic Spine:   Inspection: no gross abnormality:.  Kyphosis  Paraspinal muscle palpation: nontender  Spinous process palpation: Tender with palpation approximately T9  Tender with palpation approximately T12      Motor Exam:    Strength: Rate on 1-5 scale Right Left    C5-Elbow flexion, Deltoid 5/5  5/5    C6-Wrist extension 5/5  5/5    C7- Elbow / finger extension 5/5  5/5    C8- Finger flexion 5/5  5/5    T1- Intrinsics hand 5/5  5/5    Strength: Rate on 1-5 scale Right Left    L1/2- " hip flexion 5/5  5/5    L3- knee extension 5/5  5/5    L4- ankle dorsiflexion 5/5  5/5    L5- great toe extension 5/5  5/5    S1- ankle plantarflexion 5/5  5/5    Sensory Exam: Full and equal sensation to light touch throughout.    Neurologic: Cranial Nerves II-XII are grossly intact.     Psychiatric: Cooperative.   Gait: Wheelchair  Assistive Devices: None    Imaging Studies:   No results found for this or any previous visit.        Independent review of radiographic imaging:     Impression & Plan:       07/24/2024: Mariia Roberson is a 84 y.o. female with past medical history significant for HTN, malnutrition, melena, AAA, CHF, COPD, CVA who presents to the pain clinic for evaluation and treatment of subacute thoracic back pain secondary to T9 and T10 compression fractures now status post vertebroplasty with Dr. Perez.  Unfortunately she continues to have compression fracture pain.  Advised patient to use her brace.  Additionally we will trial baclofen, calcitonin, Percocet.  Expectation is that she will improve over the next 6 months; in the meantime we will treat conservatively.  Can consider Sprint PNS if pain persist beyond 6 months.  8/14/2024: Thoracic CT reveals new T12 compression fracture.  Will refer to Dr. Perez for consideration of vertebroplasty.  Will refer to [place] [facility] for potential osteoporosis.  Will refill Percocet 5 mg once daily as needed, 30 pills.     1. Closed fracture of multiple thoracic vertebrae with routine healing, subsequent encounter    2. T12 compression fracture, initial encounter          PLAN:  1. Medication Recommendations: Recommend Voltaren topical, NSAIDs, Tylenol.  Can trial turmeric 500 mg twice daily if NSAID contraindicated.  -Will refill Percocet 5 mg daily as needed, 30 pills, #0 refills  -Continue intranasal calcitonin  -Discontinue baclofen.  Unwanted side effects  -As part of this patient's treatment plan, patient will be prescribed controlled  substances. The patient has been made aware of appropriate use of such medications, including potential risk of somnolence, limited ability to drive and /or work safely, and potential for dependence or overdose. It has also been made clear that these medications are for use by this patient only, without concomitant use of alcohol or other substances unless prescribed.Controlled substance status of medication discussed with patient, discussed risks of medication including abuse potential and diversion potential and need to follow up for reevaluation appointment in order to receive further refills.  Deshawn was reviewed and compliant.    2. Physical Therapy: Patient advised to minimize heavy physical activity while this heals    3. Psychological: defer    4. Complementary and alternative (CAM) Therapies:     5. Labs/Diagnostic studies: None indicated     6. Imaging: CT thoracic spine report reviewed    7. Interventions: Could potentially consider intrathecal opioid trial if increased pain continues    8. Referrals: [place] [facility] for potential osteoporosis; refer back to Dr. Perez for consideration of T12 vertebroplasty    9. Records: Neurosurgery notes reviewed    10. Lifestyle goals:    Follow-up 1 month      [facility]  Jona Leon PA-C          Quality Metrics:

## 2024-08-20 ENCOUNTER — TELEPHONE (OUTPATIENT)
Dept: NEUROSURGERY | Facility: CLINIC | Age: 84
End: 2024-08-20
Payer: MEDICARE

## 2024-08-20 NOTE — TELEPHONE ENCOUNTER
Caller: RENETTA RODRIGUES(POA)    Relationship to patient: Emergency Contact    Best call back number: [phone]    Chief complaint: THORACIC COMPRESSION FRACTURE    Type of visit: FOLLOW UP    Requested date: ASAP     If rescheduling, when is the original appointment: N/A     Additional notes: PATIENT'S DAUGHTER WOULD LIKE TO SCHEDULE AS SOON AS POSSIBLE, AS PATIENT IS IN PAIN AND PAIN MANAGEMENT ARE UNABLE TO ASSIST. SHE WAS REFERRED TO DR LOZADA VIA ED ON 8/9/24, AND PAIN MANAGEMENT ADVISED TO SCHEDULE WITH DR LOZADA AS WELL. PATIENT IS ESTABLISHED AND WAS SEEN LAST MONTH, BUT THIS WOULD BE FOR A NEW FRACTURE. PLEASE CONTACT PATIENT'S DAUGHTER TO ADVISE/SCHEDULE.

## 2024-08-21 ENCOUNTER — APPOINTMENT (OUTPATIENT)
Dept: MRI IMAGING | Facility: HOSPITAL | Age: 84
End: 2024-08-21
Payer: MEDICARE

## 2024-08-21 ENCOUNTER — HOSPITAL ENCOUNTER (EMERGENCY)
Facility: HOSPITAL | Age: 84
Discharge: HOME | End: 2024-08-21
Attending: EMERGENCY MEDICINE
Payer: MEDICARE

## 2024-08-21 VITALS
DIASTOLIC BLOOD PRESSURE: 101 MMHG | BODY MASS INDEX: 12.66 KG/M2 | RESPIRATION RATE: 17 BRPM | TEMPERATURE: 97.8 F | OXYGEN SATURATION: 96 % | SYSTOLIC BLOOD PRESSURE: 166 MMHG | HEIGHT: 60 IN | HEART RATE: 67 BPM | WEIGHT: 64.5 LBS

## 2024-08-21 DIAGNOSIS — S22.000A COMPRESSION FRACTURE OF BODY OF THORACIC VERTEBRA: Primary | ICD-10-CM

## 2024-08-21 DIAGNOSIS — S22.089D CLOSED FRACTURE OF TWELFTH THORACIC VERTEBRA WITH ROUTINE HEALING, UNSPECIFIED FRACTURE MORPHOLOGY, SUBSEQUENT ENCOUNTER: Primary | ICD-10-CM

## 2024-08-21 DIAGNOSIS — E87.6 HYPOKALEMIA: ICD-10-CM

## 2024-08-21 DIAGNOSIS — M48.04 NEURAL FORAMINAL STENOSIS OF THORACIC SPINE: ICD-10-CM

## 2024-08-21 LAB
ALBUMIN SERPL-MCNC: 3.9 G/DL (ref 3.5–5.2)
ALBUMIN/GLOB SERPL: 1.2 G/DL
ALP SERPL-CCNC: 138 U/L (ref 39–117)
ALT SERPL W P-5'-P-CCNC: 12 U/L (ref 1–33)
ANION GAP SERPL CALCULATED.3IONS-SCNC: 15 MMOL/L (ref 5–15)
AST SERPL-CCNC: 21 U/L (ref 1–32)
BASOPHILS # BLD AUTO: 0.03 10*3/MM3 (ref 0–0.2)
BASOPHILS NFR BLD AUTO: 0.3 % (ref 0–1.5)
BILIRUB SERPL-MCNC: 0.3 MG/DL (ref 0–1.2)
BUN SERPL-MCNC: 19 MG/DL (ref 8–23)
BUN/CREAT SERPL: 14.6 (ref 7–25)
CALCIUM SPEC-SCNC: 9.2 MG/DL (ref 8.6–10.5)
CHLORIDE SERPL-SCNC: 94 MMOL/L (ref 98–107)
CO2 SERPL-SCNC: 29 MMOL/L (ref 22–29)
CREAT SERPL-MCNC: 1.3 MG/DL (ref 0.57–1)
DEPRECATED RDW RBC AUTO: 47.4 FL (ref 37–54)
EGFRCR SERPLBLD CKD-EPI 2021: 40.1 ML/MIN/1.73
EOSINOPHIL # BLD AUTO: 0.1 10*3/MM3 (ref 0–0.4)
EOSINOPHIL NFR BLD AUTO: 0.8 % (ref 0.3–6.2)
ERYTHROCYTE [DISTWIDTH] IN BLOOD BY AUTOMATED COUNT: 14 % (ref 12.3–15.4)
GLOBULIN UR ELPH-MCNC: 3.3 GM/DL
GLUCOSE SERPL-MCNC: 153 MG/DL (ref 65–99)
HCT VFR BLD AUTO: 38.3 % (ref 34–46.6)
HGB BLD-MCNC: 12.9 G/DL (ref 12–15.9)
IMM GRANULOCYTES # BLD AUTO: 0.03 10*3/MM3 (ref 0–0.05)
IMM GRANULOCYTES NFR BLD AUTO: 0.3 % (ref 0–0.5)
LYMPHOCYTES # BLD AUTO: 2.33 10*3/MM3 (ref 0.7–3.1)
LYMPHOCYTES NFR BLD AUTO: 19.6 % (ref 19.6–45.3)
MAGNESIUM SERPL-MCNC: 2 MG/DL (ref 1.6–2.4)
MCH RBC QN AUTO: 30.9 PG (ref 26.6–33)
MCHC RBC AUTO-ENTMCNC: 33.7 G/DL (ref 31.5–35.7)
MCV RBC AUTO: 91.8 FL (ref 79–97)
MONOCYTES # BLD AUTO: 0.78 10*3/MM3 (ref 0.1–0.9)
MONOCYTES NFR BLD AUTO: 6.6 % (ref 5–12)
NEUTROPHILS NFR BLD AUTO: 72.4 % (ref 42.7–76)
NEUTROPHILS NFR BLD AUTO: 8.61 10*3/MM3 (ref 1.7–7)
NRBC BLD AUTO-RTO: 0 /100 WBC (ref 0–0.2)
PLATELET # BLD AUTO: 366 10*3/MM3 (ref 140–450)
PMV BLD AUTO: 10.2 FL (ref 6–12)
POTASSIUM SERPL-SCNC: 3 MMOL/L (ref 3.5–5.2)
PROT SERPL-MCNC: 7.2 G/DL (ref 6–8.5)
RBC # BLD AUTO: 4.17 10*6/MM3 (ref 3.77–5.28)
SODIUM SERPL-SCNC: 138 MMOL/L (ref 136–145)
WBC NRBC COR # BLD AUTO: 11.88 10*3/MM3 (ref 3.4–10.8)

## 2024-08-21 PROCEDURE — 72148 MRI LUMBAR SPINE W/O DYE: CPT

## 2024-08-21 PROCEDURE — 25010000002 KETOROLAC TROMETHAMINE PER 15 MG: Performed by: EMERGENCY MEDICINE

## 2024-08-21 PROCEDURE — 96374 THER/PROPH/DIAG INJ IV PUSH: CPT

## 2024-08-21 PROCEDURE — 83735 ASSAY OF MAGNESIUM: CPT | Performed by: EMERGENCY MEDICINE

## 2024-08-21 PROCEDURE — 80053 COMPREHEN METABOLIC PANEL: CPT | Performed by: EMERGENCY MEDICINE

## 2024-08-21 PROCEDURE — 25010000002 MIDAZOLAM PER 1 MG: Performed by: EMERGENCY MEDICINE

## 2024-08-21 PROCEDURE — 96375 TX/PRO/DX INJ NEW DRUG ADDON: CPT

## 2024-08-21 PROCEDURE — 85025 COMPLETE CBC W/AUTO DIFF WBC: CPT | Performed by: EMERGENCY MEDICINE

## 2024-08-21 PROCEDURE — 96376 TX/PRO/DX INJ SAME DRUG ADON: CPT

## 2024-08-21 PROCEDURE — 99284 EMERGENCY DEPT VISIT MOD MDM: CPT

## 2024-08-21 PROCEDURE — 72146 MRI CHEST SPINE W/O DYE: CPT

## 2024-08-21 RX ORDER — POTASSIUM CHLORIDE 750 MG/1
40 CAPSULE, EXTENDED RELEASE ORAL ONCE
Status: COMPLETED | OUTPATIENT
Start: 2024-08-21 | End: 2024-08-21

## 2024-08-21 RX ORDER — LIDOCAINE 4 G/G
1 PATCH TOPICAL
Status: DISCONTINUED | OUTPATIENT
Start: 2024-08-21 | End: 2024-08-22 | Stop reason: HOSPADM

## 2024-08-21 RX ORDER — MIDAZOLAM HYDROCHLORIDE 1 MG/ML
0.5 INJECTION INTRAMUSCULAR; INTRAVENOUS ONCE
Status: COMPLETED | OUTPATIENT
Start: 2024-08-21 | End: 2024-08-21

## 2024-08-21 RX ORDER — ACETAMINOPHEN 500 MG
1000 TABLET ORAL ONCE
Status: COMPLETED | OUTPATIENT
Start: 2024-08-21 | End: 2024-08-21

## 2024-08-21 RX ORDER — KETOROLAC TROMETHAMINE 15 MG/ML
15 INJECTION, SOLUTION INTRAMUSCULAR; INTRAVENOUS ONCE
Status: COMPLETED | OUTPATIENT
Start: 2024-08-21 | End: 2024-08-21

## 2024-08-21 RX ORDER — MIDAZOLAM HYDROCHLORIDE 1 MG/ML
1 INJECTION INTRAMUSCULAR; INTRAVENOUS ONCE
Status: COMPLETED | OUTPATIENT
Start: 2024-08-21 | End: 2024-08-21

## 2024-08-21 RX ADMIN — ACETAMINOPHEN 1000 MG: 500 TABLET ORAL at 19:10

## 2024-08-21 RX ADMIN — MIDAZOLAM HYDROCHLORIDE 0.5 MG: 1 INJECTION, SOLUTION INTRAMUSCULAR; INTRAVENOUS at 20:46

## 2024-08-21 RX ADMIN — KETOROLAC TROMETHAMINE 15 MG: 15 INJECTION, SOLUTION INTRAMUSCULAR; INTRAVENOUS at 19:10

## 2024-08-21 RX ADMIN — MIDAZOLAM HYDROCHLORIDE 1 MG: 1 INJECTION, SOLUTION INTRAMUSCULAR; INTRAVENOUS at 21:43

## 2024-08-21 RX ADMIN — POTASSIUM CHLORIDE 40 MEQ: 750 CAPSULE, EXTENDED RELEASE ORAL at 20:17

## 2024-08-21 NOTE — ED PROVIDER NOTES
Subjective   History of Present Illness  Patient presents for evaluation of back pain.  Patient's current symptoms started multiple months ago when she had an injury causing compression fractures in her thoracic spine.  She had fractures of T9 and T11 and then a subsequent event had a fracture of T12.  She has been working with her neurosurgeon Dr. Perez in pain management but is not having any relief.  Patient's family reports that they were told to come to our emergency room for an emergent MRI of her spine today at the instruction of Dr. Perez.  Patient has had 3 episodes where she lost control of her bowels over the past 1 week.  No urinary incontinence.  No numbness or weakness in her legs.    History provided by:  Patient      Review of Systems    Past Medical History:   Diagnosis Date    AAA (abdominal aortic aneurysm)     Aneurysm 2023    Arthritis     CHF (congestive heart failure)     Claustrophobia     Clotting disorder 2024    Rectal    COPD (chronic obstructive pulmonary disease)     Fractures     Glaucoma     Hypertension     Low back pain 2024    Stroke     Thoracic disc disorder 2024       No Known Allergies    Past Surgical History:   Procedure Laterality Date    ENDOSCOPY N/A 07/13/2024    Procedure: ESOPHAGOGASTRODUODENOSCOPY;  Surgeon: Brunner, Mark I, MD;  Location:  [facility] ENDOSCOPY;  Service: Gastroenterology;  Laterality: N/A;    INTERVENTIONAL RADIOLOGY PROCEDURE N/A 07/01/2024    Procedure: IR vertebroplasty thoracic with fluoroscopy, T9 and T10;  Surgeon: Javier Perez MD;  Location: [facility] CATH INVASIVE LOCATION;  Service: Interventional Radiology;  Laterality: N/A;    JOINT REPLACEMENT      VERTEBROPLASTY  07/01/2024       Family History   Problem Relation Age of Onset    Diabetes Mother     No Known Problems Father        Social History     Socioeconomic History    Marital status:    Tobacco Use    Smoking status: Former     Current packs/day: 0.00     Average packs/day: 1.1  packs/day for 71.6 years (80.0 ttl pk-yrs)     Types: Cigarettes     Start date: 1/1/1960     Quit date: 2021     Years since quitting: 3.6   Vaping Use    Vaping status: Never Used   Substance and Sexual Activity    Alcohol use: Not Currently    Drug use: Never    Sexual activity: Not Currently           Objective   Physical Exam  Constitutional:       Comments: In obvious pain   HENT:      Head: Normocephalic and atraumatic.   Eyes:      Conjunctiva/sclera: Conjunctivae normal.      Pupils: Pupils are equal, round, and reactive to light.   Cardiovascular:      Rate and Rhythm: Normal rate and regular rhythm.      Pulses: Normal pulses.      Heart sounds: No murmur heard.     No gallop.   Pulmonary:      Effort: Pulmonary effort is normal. No respiratory distress.   Abdominal:      General: Abdomen is flat. There is no distension.      Tenderness: There is no abdominal tenderness.   Musculoskeletal:      Comments: No tenderness to the cervical or upper thoracic spine.  Severe tenderness in the lower thoracic and upper lumbar spine.  No step-offs or deformities are appreciated   Skin:     General: Skin is warm and dry.      Capillary Refill: Capillary refill takes less than 2 seconds.   Neurological:      General: No focal deficit present.      Mental Status: She is alert and oriented to person, place, and time.   Psychiatric:         Mood and Affect: Mood normal.         Behavior: Behavior normal.         Procedures           ED Course  ED Course as of 08/21/24 2341   Wed Aug 21, 2024   2338 Laboratory workup independently interpreted by myself notable for mild hypokalemia which was repleted [KB]   2338 MRIs of the thoracic and lumbar spine independently interpreted by myself demonstrate known compression fracture, severe neuroforaminal stenosis in the lower thoracic spine [KB]      ED Course User Index  [KB] Munir Eaton MD                                             Medical Decision Making  Differential  diagnosis includes pain secondary to fracture, new fracture, spinal cord compression or cauda equina syndrome.  Lab and imaging studies were ordered.    Patient's pain is able to be well-controlled in the emergency department.  Discussed MRI findings with the patient, plan for outpatient neurosurgical follow-up and they are agreeable to this plan.  They are counseled on return precautions.    Problems Addressed:  Closed fracture of twelfth thoracic vertebra with routine healing, unspecified fracture morphology, subsequent encounter: complicated acute illness or injury  Hypokalemia: complicated acute illness or injury  Neural foraminal stenosis of thoracic spine: complicated acute illness or injury    Amount and/or Complexity of Data Reviewed  Labs: ordered. Decision-making details documented in ED Course.  Radiology: ordered and independent interpretation performed. Decision-making details documented in ED Course.  Discussion of management or test interpretation with external provider(s): Consulted with on-call neurosurgical provider regarding MRI results, plan for outpatient follow-up.    Risk  OTC drugs.  Prescription drug management.  Parenteral controlled substances.  Decision regarding hospitalization.        Final diagnoses:   Closed fracture of twelfth thoracic vertebra with routine healing, unspecified fracture morphology, subsequent encounter   Neural foraminal stenosis of thoracic spine   Hypokalemia       ED Disposition  ED Disposition       ED Disposition   Discharge    Condition   Stable    Comment   --             Recent Results (from the past 24 hour(s))   Comprehensive Metabolic Panel    Collection Time: 08/21/24  6:14 PM    Specimen: Blood   Result Value Ref Range    Glucose 153 (H) 65 - 99 mg/dL    BUN 19 8 - 23 mg/dL    Creatinine 1.30 (H) 0.57 - 1.00 mg/dL    Sodium 138 136 - 145 mmol/L    Potassium 3.0 (L) 3.5 - 5.2 mmol/L    Chloride 94 (L) 98 - 107 mmol/L    CO2 29.0 22.0 - 29.0 mmol/L     Calcium 9.2 8.6 - 10.5 mg/dL    Total Protein 7.2 6.0 - 8.5 g/dL    Albumin 3.9 3.5 - 5.2 g/dL    ALT (SGPT) 12 1 - 33 U/L    AST (SGOT) 21 1 - 32 U/L    Alkaline Phosphatase 138 (H) 39 - 117 U/L    Total Bilirubin 0.3 0.0 - 1.2 mg/dL    Globulin 3.3 gm/dL    A/G Ratio 1.2 g/dL    BUN/Creatinine Ratio 14.6 7.0 - 25.0    Anion Gap 15.0 5.0 - 15.0 mmol/L    eGFR 40.1 (L) >60.0 mL/min/1.73   CBC Auto Differential    Collection Time: 08/21/24  6:14 PM    Specimen: Blood   Result Value Ref Range    WBC 11.88 (H) 3.40 - 10.80 10*3/mm3    RBC 4.17 3.77 - 5.28 10*6/mm3    Hemoglobin 12.9 12.0 - 15.9 g/dL    Hematocrit 38.3 34.0 - 46.6 %    MCV 91.8 79.0 - 97.0 fL    MCH 30.9 26.6 - 33.0 pg    MCHC 33.7 31.5 - 35.7 g/dL    RDW 14.0 12.3 - 15.4 %    RDW-SD 47.4 37.0 - 54.0 fl    MPV 10.2 6.0 - 12.0 fL    Platelets 366 140 - 450 10*3/mm3    Neutrophil % 72.4 42.7 - 76.0 %    Lymphocyte % 19.6 19.6 - 45.3 %    Monocyte % 6.6 5.0 - 12.0 %    Eosinophil % 0.8 0.3 - 6.2 %    Basophil % 0.3 0.0 - 1.5 %    Immature Grans % 0.3 0.0 - 0.5 %    Neutrophils, Absolute 8.61 (H) 1.70 - 7.00 10*3/mm3    Lymphocytes, Absolute 2.33 0.70 - 3.10 10*3/mm3    Monocytes, Absolute 0.78 0.10 - 0.90 10*3/mm3    Eosinophils, Absolute 0.10 0.00 - 0.40 10*3/mm3    Basophils, Absolute 0.03 0.00 - 0.20 10*3/mm3    Immature Grans, Absolute 0.03 0.00 - 0.05 10*3/mm3    nRBC 0.0 0.0 - 0.2 /100 WBC   Magnesium    Collection Time: 08/21/24  6:14 PM    Specimen: Blood   Result Value Ref Range    Magnesium 2.0 1.6 - 2.4 mg/dL     Note: In addition to lab results from this visit, the labs listed above may include labs taken at another facility or during a different encounter within the last 24 hours. Please correlate lab times with ED admission and discharge times for further clarification of the services performed during this visit.    MRI Lumbar Spine Without Contrast   Final Result   1. Mild acute compression deformity at the superior endplate of T12.  Additionally, there is edema of T10 and T11 without significant change in height.. No cord compression or severe canal stenosis is appreciated. Severe foraminal narrowing at T9-T10,    T10-11, and T11-T12.         Electronically Signed: Roly Garcia MD     8/21/2024 10:26 PM EDT     Workstation ID: [id number]      MRI Thoracic Spine Without Contrast   Final Result   1. Mild acute compression deformity at the superior endplate of T12. Additionally, there is edema of T10 and T11 without significant change in height.. No cord compression or severe canal stenosis is appreciated. Severe foraminal narrowing at T9-T10,    T10-11, and T11-T12.         Electronically Signed: Roly Garcia MD     8/21/2024 10:26 PM EDT     Workstation ID: [id number]        Vitals:    08/21/24 2027 08/21/24 2028 08/21/24 2224 08/21/24 2227   BP:   (!) 166/101    Patient Position:       Pulse: 79 77  67   Resp:       Temp:       TempSrc:       SpO2: 98% 97%  96%   Weight:       Height:         Medications   Lidocaine 4 % 1 patch (0 patches Transdermal Hold 8/21/24 1910)   acetaminophen (TYLENOL) tablet 1,000 mg (1,000 mg Oral Given 8/21/24 1910)   ketorolac (TORADOL) injection 15 mg (15 mg Intravenous Given 8/21/24 1910)   potassium chloride (MICRO-K/KLOR-CON) CR capsule (40 mEq Oral Given 8/21/24 2017)   midazolam (VERSED) injection 0.5 mg (0.5 mg Intravenous Given 8/21/24 2046)   midazolam (VERSED) injection 1 mg (1 mg Intravenous Given 8/21/24 2143)     ECG/EMG Results (last 24 hours)       ** No results found for the last 24 hours. **          No orders to display           No follow-up provider specified.       Medication List        Changed      acetaminophen 325 MG tablet  Commonly known as: Tylenol  Take 2 tablets by mouth Every 6 (Six) Hours As Needed for Mild Pain.  What changed:   reasons to take this  additional instructions     losartan 25 MG tablet  Commonly known as: COZAAR  Take 1 tablet by mouth Daily.  What changed: how much to  take                 Munir Eaton MD  08/21/24 2340       Munir Eaton MD  08/21/24 2340       Munir Eaton MD  08/21/24 2347

## 2024-08-22 ENCOUNTER — DOCUMENTATION (OUTPATIENT)
Dept: NEUROSURGERY | Facility: CLINIC | Age: 84
End: 2024-08-22
Payer: MEDICARE

## 2024-08-22 ENCOUNTER — PREP FOR SURGERY (OUTPATIENT)
Dept: OTHER | Facility: HOSPITAL | Age: 84
End: 2024-08-22
Payer: MEDICARE

## 2024-08-22 ENCOUNTER — TELEPHONE (OUTPATIENT)
Dept: NEUROSURGERY | Facility: CLINIC | Age: 84
End: 2024-08-22
Payer: MEDICARE

## 2024-08-22 DIAGNOSIS — M80.08XA AGE-RELATED OSTEOPOROSIS WITH CURRENT PATHOLOGICAL FRACTURE, VERTEBRA(E), INITIAL ENCOUNTER FOR FRACTURE: ICD-10-CM

## 2024-08-22 DIAGNOSIS — S22.009A: Primary | ICD-10-CM

## 2024-08-22 DIAGNOSIS — M80.88XA OTHER OSTEOPOROSIS WITH CURRENT PATHOLOGICAL FRACTURE, VERTEBRA(E), INITIAL ENCOUNTER FOR FRACTURE: ICD-10-CM

## 2024-08-22 DIAGNOSIS — S22.000A COMPRESSION FRACTURE OF BODY OF THORACIC VERTEBRA: Primary | ICD-10-CM

## 2024-08-22 NOTE — PROGRESS NOTES
Called patient's daughter, Didi, to discuss recent ED visit.  Patient has had an increase in back pain that has become intolerable.  MRI performed in the ED yesterday demonstrates 2 new compression fractures at levels T10 and T12.  Treatment options were discussed, and we will tentatively plan for a two-level kyphoplasty to be performed by Dr. Perez in the coming days.

## 2024-08-22 NOTE — TELEPHONE ENCOUNTER
Provider:  Given  Surgery/Procedure:  T9 and T10 kyphoplasty  Surgery/Procedure Date:  7/1/24  Last visit:   7/24/24  Next visit: NA     Reason for call:  Patient reported increase back pain yesterday and was informed to go the the [facility]. Received message from Lida Mack PA-C to send documentation and order to [facility] for patient to be fit for a TLSO Brace (Flexfoam).

## 2024-08-22 NOTE — DISCHARGE INSTRUCTIONS
Call your neurosurgical team tomorrow to schedule a follow-up appointment.  Return to the ER as needed for new or worsening symptoms

## 2024-08-23 ENCOUNTER — HOSPITAL ENCOUNTER (OUTPATIENT)
Facility: HOSPITAL | Age: 84
Setting detail: HOSPITAL OUTPATIENT SURGERY
Discharge: HOME | End: 2024-08-23
Attending: RADIOLOGY | Admitting: RADIOLOGY
Payer: MEDICARE

## 2024-08-23 VITALS
WEIGHT: 84.22 LBS | SYSTOLIC BLOOD PRESSURE: 119 MMHG | TEMPERATURE: 99.6 F | RESPIRATION RATE: 18 BRPM | HEART RATE: 98 BPM | BODY MASS INDEX: 16.53 KG/M2 | HEIGHT: 60 IN | DIASTOLIC BLOOD PRESSURE: 88 MMHG | OXYGEN SATURATION: 93 %

## 2024-08-23 DIAGNOSIS — S22.000A COMPRESSION FRACTURE OF BODY OF THORACIC VERTEBRA: ICD-10-CM

## 2024-08-23 DIAGNOSIS — M80.88XA OTHER OSTEOPOROSIS WITH CURRENT PATHOLOGICAL FRACTURE, VERTEBRA(E), INITIAL ENCOUNTER FOR FRACTURE: ICD-10-CM

## 2024-08-23 PROCEDURE — 99152 MOD SED SAME PHYS/QHP 5/>YRS: CPT | Performed by: RADIOLOGY

## 2024-08-23 PROCEDURE — 99153 MOD SED SAME PHYS/QHP EA: CPT | Performed by: RADIOLOGY

## 2024-08-23 PROCEDURE — 22515 PERQ VERTEBRAL AUGMENTATION: CPT | Performed by: RADIOLOGY

## 2024-08-23 PROCEDURE — C1713 ANCHOR/SCREW BN/BN,TIS/BN: HCPCS | Performed by: RADIOLOGY

## 2024-08-23 PROCEDURE — 25010000002 MIDAZOLAM PER 1 MG: Performed by: RADIOLOGY

## 2024-08-23 PROCEDURE — 94799 UNLISTED PULMONARY SVC/PX: CPT

## 2024-08-23 PROCEDURE — 22513 PERQ VERTEBRAL AUGMENTATION: CPT | Performed by: RADIOLOGY

## 2024-08-23 PROCEDURE — 25010000002 LIDOCAINE 1 % SOLUTION: Performed by: RADIOLOGY

## 2024-08-23 PROCEDURE — 94640 AIRWAY INHALATION TREATMENT: CPT

## 2024-08-23 PROCEDURE — 25010000002 FENTANYL CITRATE (PF) 50 MCG/ML SOLUTION: Performed by: RADIOLOGY

## 2024-08-23 DEVICE — CMT BONE VSTEADY RO: Type: IMPLANTABLE DEVICE | Status: FUNCTIONAL

## 2024-08-23 RX ORDER — IPRATROPIUM BROMIDE AND ALBUTEROL SULFATE 2.5; .5 MG/3ML; MG/3ML
3 SOLUTION RESPIRATORY (INHALATION)
Status: DISCONTINUED | OUTPATIENT
Start: 2024-08-23 | End: 2024-08-23 | Stop reason: HOSPADM

## 2024-08-23 RX ORDER — LIDOCAINE HYDROCHLORIDE 10 MG/ML
INJECTION, SOLUTION INFILTRATION; PERINEURAL
Status: DISCONTINUED | OUTPATIENT
Start: 2024-08-23 | End: 2024-08-23 | Stop reason: HOSPADM

## 2024-08-23 RX ORDER — FENTANYL CITRATE 50 UG/ML
INJECTION, SOLUTION INTRAMUSCULAR; INTRAVENOUS
Status: DISCONTINUED | OUTPATIENT
Start: 2024-08-23 | End: 2024-08-23 | Stop reason: HOSPADM

## 2024-08-23 RX ORDER — LOSARTAN POTASSIUM 50 MG/1
50 TABLET ORAL DAILY
COMMUNITY

## 2024-08-23 RX ORDER — MIDAZOLAM HYDROCHLORIDE 1 MG/ML
INJECTION INTRAMUSCULAR; INTRAVENOUS
Status: DISCONTINUED | OUTPATIENT
Start: 2024-08-23 | End: 2024-08-23 | Stop reason: HOSPADM

## 2024-08-23 RX ADMIN — IPRATROPIUM BROMIDE AND ALBUTEROL SULFATE 3 ML: 2.5; .5 SOLUTION RESPIRATORY (INHALATION) at 11:43

## 2024-08-23 NOTE — INTERVAL H&P NOTE
H&P updated. The patient was examined and the following changes are noted:  Ms. Roberson recently presented to the ED with worsening back pain.  MRI demonstrated new compression fractures at levels T10 and T12.  She presents today for two-level kyphoplasty to be performed by Dr. Perez.

## 2024-09-11 ENCOUNTER — OFFICE VISIT (OUTPATIENT)
Dept: PAIN MEDICINE | Facility: CLINIC | Age: 84
End: 2024-09-11
Payer: MEDICARE

## 2024-09-11 ENCOUNTER — OFFICE VISIT (OUTPATIENT)
Dept: NEUROSURGERY | Facility: CLINIC | Age: 84
End: 2024-09-11
Payer: MEDICARE

## 2024-09-11 VITALS — BODY MASS INDEX: 16.49 KG/M2 | WEIGHT: 84 LBS | HEIGHT: 60 IN

## 2024-09-11 DIAGNOSIS — S22.080S COMPRESSION FRACTURE OF T12 VERTEBRA, SEQUELA: ICD-10-CM

## 2024-09-11 DIAGNOSIS — S22.009S CLOSED FRACTURE OF MULTIPLE THORACIC VERTEBRAE, SEQUELA: Primary | ICD-10-CM

## 2024-09-11 DIAGNOSIS — Z98.890 S/P KYPHOPLASTY: Primary | ICD-10-CM

## 2024-09-11 DIAGNOSIS — R52 INTRACTABLE PAIN: ICD-10-CM

## 2024-09-11 DIAGNOSIS — M80.08XA OSTEOPOROSIS WITH PATHOLOGICAL FRACTURE OF THORACIC VERTEBRA: ICD-10-CM

## 2024-09-11 DIAGNOSIS — R41.3 MEMORY LOSS: ICD-10-CM

## 2024-09-11 PROCEDURE — 99214 OFFICE O/P EST MOD 30 MIN: CPT

## 2024-09-11 PROCEDURE — 1159F MED LIST DOCD IN RCRD: CPT

## 2024-09-11 PROCEDURE — 99213 OFFICE O/P EST LOW 20 MIN: CPT

## 2024-09-11 PROCEDURE — 1160F RVW MEDS BY RX/DR IN RCRD: CPT

## 2024-09-11 PROCEDURE — 1125F AMNT PAIN NOTED PAIN PRSNT: CPT

## 2024-09-11 RX ORDER — METOPROLOL TARTRATE 25 MG/1
1 TABLET, FILM COATED ORAL EVERY 12 HOURS SCHEDULED
COMMUNITY
Start: 2024-08-29

## 2024-09-11 NOTE — PROGRESS NOTES
Name: Mariia Roberson    : 1940     MRN: [id number]     Primary Care Provider: Susan Waters APRN    Chief Complaint  No chief complaint on file.      History of Present Illness:  Mariia Roberson is a 84 y.o. female who is well-known to the neurointerventional clinic for multiple kyphoplasties with Dr. Perez due to compression fractures.  She originally underwent T9 and T11, kyphoplasties and originally experienced minimal pain relief, she then underwent additional procedures at levels T10 and T12 approximately 2.5 weeks ago.  She has also been following with pain management.  Today she reports minimal pain and rates her pain as a 2/10.  She has been walking around her house with assistance of a walker, which she was unable to do previously due to the back pain.  She has not had to take any of her prescribed Percocet, and has only had to take Tylenol once since her most recent procedure.  Her daughter is concerned about her memory and would like a referral to neurology.  She presents today for routine follow-up.    PMHX  Allergies:  No Known Allergies  Medications    Current Outpatient Medications:     acetaminophen (Tylenol) 325 MG tablet, Take 2 tablets by mouth Every 6 (Six) Hours As Needed for Mild Pain. (Patient taking differently: Take 2 tablets by mouth Every 6 (Six) Hours As Needed for Mild Pain, Headache or Fever. OTC), Disp: , Rfl:     atorvastatin (LIPITOR) 10 MG tablet, Take 1 tablet by mouth Every Night., Disp: , Rfl:     brimonidine (ALPHAGAN) 0.2 % ophthalmic solution, Administer 1 drop to the right eye 2 (Two) Times a Day., Disp: , Rfl:     busPIRone (BUSPAR) 5 MG tablet, Take 1 tablet by mouth 2 (Two) Times a Day., Disp: , Rfl:     dorzolamide-timolol (COSOPT) 2-0.5 % ophthalmic solution, Administer 1 drop to the right eye 2 (Two) Times a Day., Disp: , Rfl:     latanoprost (XALATAN) 0.005 % ophthalmic solution, Administer 1 drop to both eyes Every Night., Disp: , Rfl:      Lidocaine 4 %, Place 1 patch on the skin as directed by provider Daily. Remove & Discard patch within 12 hours or as directed by MD, Disp: 5 each, Rfl: 0    losartan (COZAAR) 50 MG tablet, Take 1 tablet by mouth Daily., Disp: , Rfl:     metoprolol tartrate (LOPRESSOR) 25 MG tablet, Take 1 tablet by mouth Every 12 (Twelve) Hours., Disp: , Rfl:     pantoprazole (PROTONIX) 40 MG EC tablet, Take 1 tablet by mouth Daily., Disp: , Rfl:     polyethylene glycol (MIRALAX) 17 GM/SCOOP powder, Dissolve 17 g (1 capful) of powder in 8 oz of water and take by mouth Daily. (Patient taking differently: Take 17 g by mouth As Needed.), Disp: 510 g, Rfl: 0    traZODone (DESYREL) 50 MG tablet, Take 1 tablet by mouth Every Night., Disp: , Rfl:   Past Medical History:  Past Medical History:   Diagnosis Date    AAA (abdominal aortic aneurysm)     Aneurysm 2023    Arthritis     CHF (congestive heart failure)     Claustrophobia     Clotting disorder 2024    Rectal    COPD (chronic obstructive pulmonary disease)     Fractures     Glaucoma     Hypertension     Low back pain 2024    Stroke     Thoracic disc disorder 2024    TIA (transient ischemic attack) 2024     Past Surgical History:  Past Surgical History:   Procedure Laterality Date    ENDOSCOPY N/A 07/13/2024    Procedure: ESOPHAGOGASTRODUODENOSCOPY;  Surgeon: Brunner, Mark I, MD;  Location: [facility] ENDOSCOPY;  Service: Gastroenterology;  Laterality: N/A;    INTERVENTIONAL RADIOLOGY PROCEDURE N/A 07/01/2024    Procedure: IR vertebroplasty thoracic with fluoroscopy, T9 and T10;  Surgeon: Javier Perez MD;  Location:  [facility];  Service: Interventional Radiology;  Laterality: N/A;    INTERVENTIONAL RADIOLOGY PROCEDURE N/A 8/23/2024    Procedure: IR kyphoplasty, thoracic;  Surgeon: Javier ePrez MD;  Location:  [facility];  Service: Interventional Radiology;  Laterality: N/A;    JOINT REPLACEMENT      VERTEBROPLASTY  07/01/2024     Social  Hx:  Social History     Tobacco Use    Smoking status: Former     Current packs/day: 0.00     Average packs/day: 1.1 packs/day for 71.6 years (80.0 ttl pk-yrs)     Types: Cigarettes     Start date: 1/1/1960     Quit date: 2021     Years since quitting: 3.6   Vaping Use    Vaping status: Never Used   Substance Use Topics    Alcohol use: Not Currently    Drug use: Never     Family Hx:  Family History   Problem Relation Age of Onset    Diabetes Mother     No Known Problems Father      Review of Systems:        Review of Systems   Constitutional:  Negative for activity change, appetite change, chills, diaphoresis, fatigue, fever and unexpected weight change.   HENT:  Negative for congestion, dental problem, drooling, ear discharge, ear pain, facial swelling, hearing loss, mouth sores, nosebleeds, postnasal drip, rhinorrhea, sinus pressure, sinus pain, sneezing, sore throat, tinnitus, trouble swallowing and voice change.    Eyes:  Negative for photophobia, pain, discharge, redness, itching and visual disturbance.   Respiratory:  Negative for apnea, cough, choking, chest tightness, shortness of breath, wheezing and stridor.    Cardiovascular:  Negative for chest pain, palpitations and leg swelling.   Gastrointestinal:  Negative for abdominal distention, abdominal pain, anal bleeding, blood in stool, constipation, diarrhea, nausea, rectal pain and vomiting.   Endocrine: Negative for cold intolerance, heat intolerance, polydipsia, polyphagia and polyuria.   Genitourinary:  Negative for decreased urine volume, difficulty urinating, dyspareunia, dysuria, enuresis, flank pain, frequency, genital sores, hematuria, menstrual problem, pelvic pain, urgency, vaginal bleeding, vaginal discharge and vaginal pain.   Musculoskeletal:  Positive for back pain and gait problem. Negative for arthralgias, joint swelling, myalgias, neck pain and neck stiffness.   Skin:  Negative for color change, pallor, rash and wound.    Allergic/Immunologic: Negative for environmental allergies, food allergies and immunocompromised state.   Neurological:  Positive for weakness. Negative for dizziness, tremors, seizures, syncope, facial asymmetry, speech difficulty, light-headedness, numbness and headaches.   Hematological:  Negative for adenopathy. Does not bruise/bleed easily.   Psychiatric/Behavioral:  Positive for confusion. Negative for agitation, behavioral problems, decreased concentration, dysphoric mood, hallucinations, self-injury, sleep disturbance and suicidal ideas. The patient is not nervous/anxious and is not hyperactive.         Review of  Imaging: No new imaging    Vital Signs: There were no vitals filed for this visit.     Physical Exam  General: Frail, in no acute distress.    Neuro: Alert and oriented x 3.  Nonfocal neurologic exam.  Speech is clear.  No facial droop or pronator drift.  I do not appreciate any gross visual field deficits.  EOMs intact bilaterally.  Strength is symmetric in upper and lower extremities. Ambulates with assistance of a wheelchair or walker.       Social History    Tobacco Use      Smoking status: Former        Packs/day: 0.00        Years: 1.1 packs/day for 71.6 years (80.0 ttl pk-yrs)        Types: Cigarettes        Start date: 1/1/1960        Quit date: 2021        Years since quitting: 3.6      Smokeless tobacco: Not on file       Tobacco Use: Medium Risk (9/11/2024)    Patient History     Smoking Tobacco Use: Former     Smokeless Tobacco Use: Unknown     Passive Exposure: Not on file         STEADI Fall Risk Assessment was completed, and patient is at MODERATE risk for falls. Assessment completed on:7/24/2024        Assessment/Plan    Diagnoses and all orders for this visit:    1. Closed fracture of multiple thoracic vertebrae, sequela (Primary)    2. 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 Intractable pain    4. Memory loss  -     Ambulatory Referral to Neurology          Mariia Roberson is a 84 y.o. female who is s/p multiple kyphoplasties.  She has done exceptionally well following her most recent procedure and has very little pain.  I discussed the patient with Cj Leon PA-C in pain management and they are referring her to the [place] [facility] for potential osteoporosis. I have sent a referral to neurology per the daughter's request for memory loss issues. We will be happy to see Ms. Roberson back on an as-needed basis.     Patient encouraged to contact us if she has any changes in her condition or any concerns.    Any copied data from previous notes included in the (1) HPI, (2) PE, (3) MDM and/or Assessment and Plan has been reviewed and accurate as of 09/11/24.    Michelle Dowell PA-C  09/11/24

## 2024-09-11 NOTE — PROGRESS NOTES
Referring Physician: No referring provider defined for this encounter.    Primary Physician: Susan Waters APRN    CHIEF COMPLAINT or REASON FOR VISIT: Follow-up (Patient reports that she feels better since last office visit. ) and Closed fracture of multiple thoracic vertebrae with routine      Initial history of present illness on 07/24/2024:  Ms. Mariia Roberson is 84 y.o. female who presents as a new patient referral for ration treatment of midline thoracic back pain after compression fracture.  She is accompanied by her daughter today.  Apparently she has a longstanding history of mild back pain however developed acute midline thoracic back pain after being picked up by her son.  She was found to have T9 and T10 compression fractures for which she underwent vertebroplasty with Dr. Perez on 7/1/2024.  According to her daughter she did have a few days of modestly improved symptoms however pain quickly returned.  She continues complain of severe midline thoracic back pain.  Patient denies any bowel or bladder dysfunction, lower extremity weakness, new onset saddle anesthesia or unexplained weight loss.   She was recently evaluated again by our neurointervention team, Michelle Dowell PA-C, who referred for management of persistent back pain.  Patient had been given a TLSO but refuses to wear it.    Interval history:  Patient returns to clinic today after undergoing a T12 kyphoplasty with Dr. Perez on 8/23/2024.  She reports excellent relief from this procedure.  She is accompanied by her daughter today.  She has very little back pain on a day-to-day basis and is no longer taking prescribed pain medication.  She will take an occasional Tylenol at night if the pain becomes little worse.  They are pleased with the results from this procedure and has no new complaints at this time.  Patient's daughter does express some concerns for memory loss/dementia.  A referral was placed to neurology for this issue by  neurosurgery.  I did encourage patient to reach out to us if her pain worsens or she exhibits any new symptoms.  They are thankful for the relief from these services.  She does have a pending evaluation with [facility] regarding potential osteoporosis.        Interventions:      Objective Pain Scoring:   BRIEF PAIN INVENTORY:  Total score:   Pain Score    09/11/24 1124   PainSc:   2   PainLoc: Back      PHQ-2: PHQ-2 Total Score: 2  PHQ-9: PHQ-9: Brief Depression Severity Measure Score: 12  Opioid Risk Tool:         Review of Systems:   ROS negative except as otherwise noted     Past Medical History:   Past Medical History:   Diagnosis Date    AAA (abdominal aortic aneurysm)     Aneurysm 2023    Arthritis     CHF (congestive heart failure)     Claustrophobia     Clotting disorder 2024    Rectal    COPD (chronic obstructive pulmonary disease)     Fractures     Glaucoma     Hypertension     Low back pain 2024    Stroke     Thoracic disc disorder 2024    TIA (transient ischemic attack) 2024         Past Surgical History:   Past Surgical History:   Procedure Laterality Date    ENDOSCOPY N/A 07/13/2024    Procedure: ESOPHAGOGASTRODUODENOSCOPY;  Surgeon: Brunner, Mark I, MD;  Location:  [facility];  Service: Gastroenterology;  Laterality: N/A;    INTERVENTIONAL RADIOLOGY PROCEDURE N/A 07/01/2024    Procedure: IR vertebroplasty thoracic with fluoroscopy, T9 and T10;  Surgeon: Javier Perez MD;  Location:  [facility];  Service: Interventional Radiology;  Laterality: N/A;    INTERVENTIONAL RADIOLOGY PROCEDURE N/A 8/23/2024    Procedure: IR kyphoplasty, thoracic;  Surgeon: Javier Perez MD;  Location:  [facility];  Service: Interventional Radiology;  Laterality: N/A;    JOINT REPLACEMENT      VERTEBROPLASTY  07/01/2024         Family History   Family History   Problem Relation Age of Onset    Diabetes Mother     No Known Problems Father          Social  History   Social History     Socioeconomic History    Marital status:    Tobacco Use    Smoking status: Former     Current packs/day: 0.00     Average packs/day: 1.1 packs/day for 71.6 years (80.0 ttl pk-yrs)     Types: Cigarettes     Start date: 1/1/1960     Quit date: 2021     Years since quitting: 3.6   Vaping Use    Vaping status: Never Used   Substance and Sexual Activity    Alcohol use: Not Currently    Drug use: Never    Sexual activity: Not Currently        Medications:     Current Outpatient Medications:     acetaminophen (Tylenol) 325 MG tablet, Take 2 tablets by mouth Every 6 (Six) Hours As Needed for Mild Pain. (Patient taking differently: Take 2 tablets by mouth Every 6 (Six) Hours As Needed for Mild Pain, Headache or Fever. OTC), Disp: , Rfl:     atorvastatin (LIPITOR) 10 MG tablet, Take 1 tablet by mouth Every Night., Disp: , Rfl:     brimonidine (ALPHAGAN) 0.2 % ophthalmic solution, Administer 1 drop to the right eye 2 (Two) Times a Day., Disp: , Rfl:     busPIRone (BUSPAR) 5 MG tablet, Take 1 tablet by mouth 2 (Two) Times a Day., Disp: , Rfl:     dorzolamide-timolol (COSOPT) 2-0.5 % ophthalmic solution, Administer 1 drop to the right eye 2 (Two) Times a Day., Disp: , Rfl:     latanoprost (XALATAN) 0.005 % ophthalmic solution, Administer 1 drop to both eyes Every Night., Disp: , Rfl:     Lidocaine 4 %, Place 1 patch on the skin as directed by provider Daily. Remove & Discard patch within 12 hours or as directed by MD, Disp: 5 each, Rfl: 0    losartan (COZAAR) 50 MG tablet, Take 1 tablet by mouth Daily., Disp: , Rfl:     metoprolol tartrate (LOPRESSOR) 25 MG tablet, Take 1 tablet by mouth Every 12 (Twelve) Hours., Disp: , Rfl:     pantoprazole (PROTONIX) 40 MG EC tablet, Take 1 tablet by mouth Daily., Disp: , Rfl:     polyethylene glycol (MIRALAX) 17 GM/SCOOP powder, Dissolve 17 g (1 capful) of powder in 8 oz of water and take by mouth Daily. (Patient taking differently: Take 17 g by mouth As  "Needed.), Disp: 510 g, Rfl: 0    traZODone (DESYREL) 50 MG tablet, Take 1 tablet by mouth Every Night., Disp: , Rfl:         Physical Exam:     Vitals:    09/11/24 1124   Weight: 38.1 kg (84 lb)   Height: 152.4 cm (60\")   PainSc:   2   PainLoc: Back        General: Alert and oriented, No acute distress.   HEENT: Normocephalic, atraumatic.   Cardiovascular: No gross edema  Respiratory: Respirations are non-labored    Thoracic Spine:   Inspection: no gross abnormality:.  Kyphosis  Paraspinal muscle palpation: nontender  Spinous process palpation: Tender with palpation approximately T9  Tender with palpation approximately T12      Motor Exam:    Strength: Rate on 1-5 scale Right Left    C5-Elbow flexion, Deltoid 5/5  5/5    C6-Wrist extension 5/5  5/5    C7- Elbow / finger extension 5/5  5/5    C8- Finger flexion 5/5  5/5    T1- Intrinsics hand 5/5  5/5    Strength: Rate on 1-5 scale Right Left    L1/2- hip flexion 5/5  5/5    L3- knee extension 5/5  5/5    L4- ankle dorsiflexion 5/5  5/5    L5- great toe extension 5/5  5/5    S1- ankle plantarflexion 5/5  5/5    Sensory Exam: Full and equal sensation to light touch throughout.    Neurologic: Cranial Nerves II-XII are grossly intact.     Psychiatric: Cooperative.   Gait: Wheelchair  Assistive Devices: None    Imaging Studies:   No results found for this or any previous visit.        Independent review of radiographic imaging:     Impression & Plan:       07/24/2024: Mariia Roberson is a 84 y.o. female with past medical history significant for HTN, malnutrition, melena, AAA, CHF, COPD, CVA who presents to the pain clinic for evaluation and treatment of subacute thoracic back pain secondary to T9 and T10 compression fractures now status post vertebroplasty with Dr. Perez.  Unfortunately she continues to have compression fracture pain.  Advised patient to use her brace.  Additionally we will trial baclofen, calcitonin, Percocet.  Expectation is that she will " improve over the next 6 months; in the meantime we will treat conservatively.  Can consider Sprint PNS if pain persist beyond 6 months.  2024: Thoracic CT reveals new T12 compression fracture.  Will refer to Dr. Perez for consideration of vertebroplasty.  Will refer to [place] [facility] for potential osteoporosis.  Will refill Percocet 5 mg once daily as needed, 30 pills.   2024:  s/p T12 kyphoplasty with Dr. Perez.  Doing very well; No new complaints; Very little pain on a day-to-day basis.  No longer using pain medication.  Will follow-up as needed    1. S/P kyphoplasty    2. Compression fracture of T12 vertebra, sequela            PLAN:  1. Medication Recommendations: Recommend Voltaren topical, NSAIDs, Tylenol.  Can trial turmeric 500 mg twice daily if NSAID contraindicated.  -Discontinue Percocet 5 mg  -Discontinue intranasal calcitonin    2. Physical Therapy: Defer to PCP and [place] [facility]    3. Psychological: defer    4. Complementary and alternative (CAM) Therapies:     5. Labs/Diagnostic studies: None indicated     6. Imagin. Interventions:    8. Referrals: Pending evaluation at [facility] for potential osteoporosis    9. Records: Discussed patient with Michelle Dowell PA-C.     10. Lifestyle goals:    Follow-up as needed.  I did inform patient to let us know if she has any return of symptoms or any new issues and we will reschedule her appropriately    [facility]  Jona Leon PA-C          Quality Metrics:

## (undated) DEVICE — CONTN GRAD MEAS TRIANG 32OZ BLK

## (undated) DEVICE — INTRO ACCSR BLNT TP

## (undated) DEVICE — ADAPT CLN LUM OLYMP AIR/H20

## (undated) DEVICE — ST LINER SAFECAP GRN RED CP STRL

## (undated) DEVICE — LUBE JELLY FOIL PACK 1.4 OZ: Brand: MEDLINE INDUSTRIES, INC.

## (undated) DEVICE — THE BITE BLOCK MAXI, LATEX FREE STRAP IS USED TO PROTECT THE ENDOSCOPE INSERTION TUBE FROM BEING BITTEN BY THE PATIENT.

## (undated) DEVICE — Device

## (undated) DEVICE — SOL IRR H2O BTL 1000ML STRL

## (undated) DEVICE — LIMB HOLDER, WRIST/ANKLE: Brand: DEROYAL

## (undated) DEVICE — FIRST STEP BEDSIDE ADD WATER KIT - RESEALABLE STAND-UP POUCH, ENDOSCOPIC CLEANING PAD - 1 POUCH: Brand: FIRST STEP BEDSIDE ADD WATER KIT - RESEALABLE STAND-UP POUCH, ENDOSCOPIC CLEANIN

## (undated) DEVICE — KT ORCA ORCAPOD DISP STRL

## (undated) DEVICE — TUBING, SUCTION, 1/4" X 10', STRAIGHT: Brand: MEDLINE

## (undated) DEVICE — KT CMT MIXER PICOMIX5

## (undated) DEVICE — KT KYPHOPLASTY BI BALN 15MM

## (undated) DEVICE — NDL ACC DIR 10G

## (undated) DEVICE — LEX NEURO ANGIOGRAPHY: Brand: MEDLINE INDUSTRIES, INC.

## (undated) DEVICE — PK CATH CARD 10

## (undated) DEVICE — NDL SPINE 22G 31/2IN BLK

## (undated) DEVICE — SYR LUERLOK 50ML

## (undated) DEVICE — HYBRID CO2 TUBING/CAP SET FOR OLYMPUS® SCOPES & CO2 SOURCE: Brand: ERBE

## (undated) DEVICE — SOLIDIFIER LIQ PREMISORB 1500CC